# Patient Record
Sex: FEMALE | Race: WHITE | Employment: OTHER | URBAN - METROPOLITAN AREA
[De-identification: names, ages, dates, MRNs, and addresses within clinical notes are randomized per-mention and may not be internally consistent; named-entity substitution may affect disease eponyms.]

---

## 2017-02-07 ENCOUNTER — OFFICE VISIT (OUTPATIENT)
Dept: INTERNAL MEDICINE CLINIC | Age: 82
End: 2017-02-07

## 2017-02-07 VITALS
DIASTOLIC BLOOD PRESSURE: 80 MMHG | HEIGHT: 62 IN | BODY MASS INDEX: 24.29 KG/M2 | HEART RATE: 84 BPM | RESPIRATION RATE: 14 BRPM | WEIGHT: 132 LBS | SYSTOLIC BLOOD PRESSURE: 120 MMHG

## 2017-02-07 DIAGNOSIS — I50.32 HEART FAILURE, DIASTOLIC, CHRONIC (HCC): ICD-10-CM

## 2017-02-07 DIAGNOSIS — Z86.79 OLD CEREBRAL HEMORRHAGE WITHOUT LATE EFFECT: ICD-10-CM

## 2017-02-07 DIAGNOSIS — Z95.2 S/P MVR (MITRAL VALVE REPLACEMENT): ICD-10-CM

## 2017-02-07 DIAGNOSIS — I25.5 CARDIOMYOPATHY, ISCHEMIC: ICD-10-CM

## 2017-02-07 DIAGNOSIS — I10 ESSENTIAL HYPERTENSION: ICD-10-CM

## 2017-02-07 DIAGNOSIS — I50.32 CHRONIC DIASTOLIC CHF (CONGESTIVE HEART FAILURE) (HCC): ICD-10-CM

## 2017-02-07 DIAGNOSIS — I48.0 PAROXYSMAL ATRIAL FIBRILLATION (HCC): ICD-10-CM

## 2017-02-07 DIAGNOSIS — I25.10 CORONARY ARTERY DISEASE INVOLVING NATIVE CORONARY ARTERY OF NATIVE HEART WITHOUT ANGINA PECTORIS: Primary | ICD-10-CM

## 2017-02-07 LAB
A/G RATIO: 1.3 (ref 1.1–2.2)
ALBUMIN SERPL-MCNC: 4.3 G/DL (ref 3.4–5)
ALP BLD-CCNC: 109 U/L (ref 40–129)
ALT SERPL-CCNC: 12 U/L (ref 10–40)
ANION GAP SERPL CALCULATED.3IONS-SCNC: 19 MMOL/L (ref 3–16)
AST SERPL-CCNC: 20 U/L (ref 15–37)
BASOPHILS ABSOLUTE: 0.1 K/UL (ref 0–0.2)
BASOPHILS RELATIVE PERCENT: 1.1 %
BILIRUB SERPL-MCNC: 0.4 MG/DL (ref 0–1)
BUN BLDV-MCNC: 36 MG/DL (ref 7–20)
CALCIUM SERPL-MCNC: 9.5 MG/DL (ref 8.3–10.6)
CHLORIDE BLD-SCNC: 99 MMOL/L (ref 99–110)
CO2: 24 MMOL/L (ref 21–32)
CREAT SERPL-MCNC: 1.1 MG/DL (ref 0.6–1.2)
EOSINOPHILS ABSOLUTE: 0.3 K/UL (ref 0–0.6)
EOSINOPHILS RELATIVE PERCENT: 3.1 %
GFR AFRICAN AMERICAN: 57
GFR NON-AFRICAN AMERICAN: 47
GLOBULIN: 3.2 G/DL
GLUCOSE BLD-MCNC: 83 MG/DL (ref 70–99)
HCT VFR BLD CALC: 36.1 % (ref 36–48)
HEMOGLOBIN: 12 G/DL (ref 12–16)
LYMPHOCYTES ABSOLUTE: 2.2 K/UL (ref 1–5.1)
LYMPHOCYTES RELATIVE PERCENT: 24.5 %
MCH RBC QN AUTO: 29.4 PG (ref 26–34)
MCHC RBC AUTO-ENTMCNC: 33.2 G/DL (ref 31–36)
MCV RBC AUTO: 88.5 FL (ref 80–100)
MONOCYTES ABSOLUTE: 0.8 K/UL (ref 0–1.3)
MONOCYTES RELATIVE PERCENT: 8.6 %
NEUTROPHILS ABSOLUTE: 5.7 K/UL (ref 1.7–7.7)
NEUTROPHILS RELATIVE PERCENT: 62.7 %
PDW BLD-RTO: 15.2 % (ref 12.4–15.4)
PLATELET # BLD: 239 K/UL (ref 135–450)
PMV BLD AUTO: 8.4 FL (ref 5–10.5)
POTASSIUM SERPL-SCNC: 4.4 MMOL/L (ref 3.5–5.1)
RBC # BLD: 4.07 M/UL (ref 4–5.2)
SODIUM BLD-SCNC: 142 MMOL/L (ref 136–145)
TOTAL PROTEIN: 7.5 G/DL (ref 6.4–8.2)
WBC # BLD: 9.1 K/UL (ref 4–11)

## 2017-02-07 PROCEDURE — 99214 OFFICE O/P EST MOD 30 MIN: CPT | Performed by: INTERNAL MEDICINE

## 2017-02-07 ASSESSMENT — ENCOUNTER SYMPTOMS
SHORTNESS OF BREATH: 0
RHINORRHEA: 0
WHEEZING: 0
NAUSEA: 0
VOMITING: 0
COUGH: 0
ABDOMINAL PAIN: 0

## 2017-03-03 RX ORDER — LISINOPRIL 10 MG/1
TABLET ORAL
Qty: 30 TABLET | Refills: 2 | Status: SHIPPED | OUTPATIENT
Start: 2017-03-03 | End: 2017-05-09 | Stop reason: SDUPTHER

## 2017-03-20 ENCOUNTER — TELEPHONE (OUTPATIENT)
Dept: INTERNAL MEDICINE CLINIC | Age: 82
End: 2017-03-20

## 2017-03-21 ENCOUNTER — TELEPHONE (OUTPATIENT)
Dept: INTERNAL MEDICINE CLINIC | Age: 82
End: 2017-03-21

## 2017-03-21 RX ORDER — BENZONATATE 100 MG/1
100 CAPSULE ORAL 3 TIMES DAILY PRN
Qty: 20 CAPSULE | Refills: 0 | Status: SHIPPED | OUTPATIENT
Start: 2017-03-21 | End: 2017-05-09

## 2017-03-21 RX ORDER — AMOXICILLIN 500 MG/1
500 CAPSULE ORAL 3 TIMES DAILY
Qty: 15 CAPSULE | Refills: 0 | Status: SHIPPED | OUTPATIENT
Start: 2017-03-21 | End: 2017-05-09

## 2017-04-17 RX ORDER — FUROSEMIDE 40 MG/1
TABLET ORAL
Qty: 30 TABLET | Refills: 4 | Status: SHIPPED | OUTPATIENT
Start: 2017-04-17 | End: 2017-08-11 | Stop reason: SDUPTHER

## 2017-04-24 RX ORDER — ATORVASTATIN CALCIUM 20 MG/1
TABLET, FILM COATED ORAL
Qty: 30 TABLET | Refills: 2 | Status: SHIPPED | OUTPATIENT
Start: 2017-04-24 | End: 2017-07-26 | Stop reason: SDUPTHER

## 2017-05-09 ENCOUNTER — OFFICE VISIT (OUTPATIENT)
Dept: INTERNAL MEDICINE CLINIC | Age: 82
End: 2017-05-09

## 2017-05-09 VITALS
BODY MASS INDEX: 24.84 KG/M2 | WEIGHT: 135 LBS | SYSTOLIC BLOOD PRESSURE: 130 MMHG | HEART RATE: 84 BPM | RESPIRATION RATE: 14 BRPM | DIASTOLIC BLOOD PRESSURE: 80 MMHG | HEIGHT: 62 IN

## 2017-05-09 DIAGNOSIS — I25.10 CORONARY ARTERY DISEASE INVOLVING NATIVE CORONARY ARTERY OF NATIVE HEART WITHOUT ANGINA PECTORIS: Primary | ICD-10-CM

## 2017-05-09 DIAGNOSIS — I10 ESSENTIAL HYPERTENSION: ICD-10-CM

## 2017-05-09 DIAGNOSIS — I48.0 PAROXYSMAL ATRIAL FIBRILLATION (HCC): ICD-10-CM

## 2017-05-09 DIAGNOSIS — Z86.79 OLD CEREBRAL HEMORRHAGE WITHOUT LATE EFFECT: ICD-10-CM

## 2017-05-09 DIAGNOSIS — I25.5 CARDIOMYOPATHY, ISCHEMIC: ICD-10-CM

## 2017-05-09 DIAGNOSIS — Z95.2 S/P MVR (MITRAL VALVE REPLACEMENT): ICD-10-CM

## 2017-05-09 DIAGNOSIS — I50.32 CHRONIC DIASTOLIC CHF (CONGESTIVE HEART FAILURE) (HCC): ICD-10-CM

## 2017-05-09 PROCEDURE — 99214 OFFICE O/P EST MOD 30 MIN: CPT | Performed by: INTERNAL MEDICINE

## 2017-05-09 RX ORDER — LISINOPRIL 10 MG/1
10 TABLET ORAL DAILY
Qty: 30 TABLET | Refills: 2 | Status: SHIPPED | OUTPATIENT
Start: 2017-05-09 | End: 2017-08-11 | Stop reason: SDUPTHER

## 2017-05-09 ASSESSMENT — ENCOUNTER SYMPTOMS
COUGH: 0
NAUSEA: 0
WHEEZING: 0
RHINORRHEA: 0
SHORTNESS OF BREATH: 0
ABDOMINAL PAIN: 0
VOMITING: 0

## 2017-07-27 RX ORDER — ATORVASTATIN CALCIUM 20 MG/1
TABLET, FILM COATED ORAL
Qty: 30 TABLET | Refills: 2 | Status: SHIPPED | OUTPATIENT
Start: 2017-07-27 | End: 2017-10-19 | Stop reason: SDUPTHER

## 2017-08-11 ENCOUNTER — OFFICE VISIT (OUTPATIENT)
Dept: INTERNAL MEDICINE CLINIC | Age: 82
End: 2017-08-11

## 2017-08-11 VITALS
DIASTOLIC BLOOD PRESSURE: 80 MMHG | HEART RATE: 70 BPM | HEIGHT: 62 IN | RESPIRATION RATE: 14 BRPM | BODY MASS INDEX: 25.21 KG/M2 | SYSTOLIC BLOOD PRESSURE: 110 MMHG | WEIGHT: 137 LBS

## 2017-08-11 DIAGNOSIS — I25.5 CARDIOMYOPATHY, ISCHEMIC: ICD-10-CM

## 2017-08-11 DIAGNOSIS — I48.0 PAROXYSMAL ATRIAL FIBRILLATION (HCC): ICD-10-CM

## 2017-08-11 DIAGNOSIS — I10 ESSENTIAL HYPERTENSION: ICD-10-CM

## 2017-08-11 DIAGNOSIS — I25.10 CORONARY ARTERY DISEASE INVOLVING NATIVE CORONARY ARTERY OF NATIVE HEART WITHOUT ANGINA PECTORIS: Primary | ICD-10-CM

## 2017-08-11 DIAGNOSIS — I50.32 CHRONIC DIASTOLIC CHF (CONGESTIVE HEART FAILURE) (HCC): ICD-10-CM

## 2017-08-11 DIAGNOSIS — Z95.1 S/P CABG (CORONARY ARTERY BYPASS GRAFT): ICD-10-CM

## 2017-08-11 DIAGNOSIS — Z95.2 S/P MVR (MITRAL VALVE REPLACEMENT): ICD-10-CM

## 2017-08-11 LAB
A/G RATIO: 1.2 (ref 1.1–2.2)
ALBUMIN SERPL-MCNC: 4.2 G/DL (ref 3.4–5)
ALP BLD-CCNC: 115 U/L (ref 40–129)
ALT SERPL-CCNC: 32 U/L (ref 10–40)
ANION GAP SERPL CALCULATED.3IONS-SCNC: 13 MMOL/L (ref 3–16)
AST SERPL-CCNC: 34 U/L (ref 15–37)
BASOPHILS ABSOLUTE: 0.1 K/UL (ref 0–0.2)
BASOPHILS RELATIVE PERCENT: 1.6 %
BILIRUB SERPL-MCNC: 0.6 MG/DL (ref 0–1)
BUN BLDV-MCNC: 27 MG/DL (ref 7–20)
CALCIUM SERPL-MCNC: 9.4 MG/DL (ref 8.3–10.6)
CHLORIDE BLD-SCNC: 101 MMOL/L (ref 99–110)
CHOLESTEROL, TOTAL: 175 MG/DL (ref 0–199)
CO2: 28 MMOL/L (ref 21–32)
CREAT SERPL-MCNC: 1 MG/DL (ref 0.6–1.2)
EOSINOPHILS ABSOLUTE: 0.4 K/UL (ref 0–0.6)
EOSINOPHILS RELATIVE PERCENT: 5.6 %
GFR AFRICAN AMERICAN: >60
GFR NON-AFRICAN AMERICAN: 53
GLOBULIN: 3.4 G/DL
GLUCOSE BLD-MCNC: 90 MG/DL (ref 70–99)
HCT VFR BLD CALC: 38.1 % (ref 36–48)
HDLC SERPL-MCNC: 52 MG/DL (ref 40–60)
HEMOGLOBIN: 12.4 G/DL (ref 12–16)
LDL CHOLESTEROL CALCULATED: 95 MG/DL
LYMPHOCYTES ABSOLUTE: 1.5 K/UL (ref 1–5.1)
LYMPHOCYTES RELATIVE PERCENT: 19.7 %
MCH RBC QN AUTO: 29.1 PG (ref 26–34)
MCHC RBC AUTO-ENTMCNC: 32.6 G/DL (ref 31–36)
MCV RBC AUTO: 89.4 FL (ref 80–100)
MONOCYTES ABSOLUTE: 0.7 K/UL (ref 0–1.3)
MONOCYTES RELATIVE PERCENT: 8.5 %
NEUTROPHILS ABSOLUTE: 5 K/UL (ref 1.7–7.7)
NEUTROPHILS RELATIVE PERCENT: 64.6 %
PDW BLD-RTO: 14.6 % (ref 12.4–15.4)
PLATELET # BLD: 240 K/UL (ref 135–450)
PMV BLD AUTO: 8.3 FL (ref 5–10.5)
POTASSIUM SERPL-SCNC: 4.9 MMOL/L (ref 3.5–5.1)
RBC # BLD: 4.26 M/UL (ref 4–5.2)
SODIUM BLD-SCNC: 142 MMOL/L (ref 136–145)
TOTAL PROTEIN: 7.6 G/DL (ref 6.4–8.2)
TRIGL SERPL-MCNC: 141 MG/DL (ref 0–150)
TSH SERPL DL<=0.05 MIU/L-ACNC: 1.83 UIU/ML (ref 0.27–4.2)
URIC ACID, SERUM: 8.3 MG/DL (ref 2.6–6)
VLDLC SERPL CALC-MCNC: 28 MG/DL
WBC # BLD: 7.7 K/UL (ref 4–11)

## 2017-08-11 PROCEDURE — 99214 OFFICE O/P EST MOD 30 MIN: CPT | Performed by: INTERNAL MEDICINE

## 2017-08-11 RX ORDER — FUROSEMIDE 40 MG/1
40 TABLET ORAL DAILY
Qty: 90 TABLET | Refills: 3 | Status: SHIPPED | OUTPATIENT
Start: 2017-08-11 | End: 2018-08-09 | Stop reason: SDUPTHER

## 2017-08-11 RX ORDER — LISINOPRIL 10 MG/1
10 TABLET ORAL DAILY
Qty: 90 TABLET | Refills: 3 | Status: SHIPPED | OUTPATIENT
Start: 2017-08-11 | End: 2018-09-04 | Stop reason: SDUPTHER

## 2017-08-11 RX ORDER — CARVEDILOL 6.25 MG/1
6.25 TABLET ORAL 2 TIMES DAILY WITH MEALS
Qty: 180 TABLET | Refills: 3 | Status: SHIPPED | OUTPATIENT
Start: 2017-08-11 | End: 2018-10-08 | Stop reason: SDUPTHER

## 2017-08-11 ASSESSMENT — PATIENT HEALTH QUESTIONNAIRE - PHQ9
1. LITTLE INTEREST OR PLEASURE IN DOING THINGS: 0
SUM OF ALL RESPONSES TO PHQ9 QUESTIONS 1 & 2: 0
SUM OF ALL RESPONSES TO PHQ QUESTIONS 1-9: 0
2. FEELING DOWN, DEPRESSED OR HOPELESS: 0

## 2017-08-11 ASSESSMENT — ENCOUNTER SYMPTOMS
COUGH: 0
SHORTNESS OF BREATH: 0
VOMITING: 0
WHEEZING: 0
NAUSEA: 0
ABDOMINAL PAIN: 0
RHINORRHEA: 0

## 2017-10-20 RX ORDER — ATORVASTATIN CALCIUM 20 MG/1
TABLET, FILM COATED ORAL
Qty: 30 TABLET | Refills: 2 | Status: SHIPPED | OUTPATIENT
Start: 2017-10-20 | End: 2018-01-16 | Stop reason: SDUPTHER

## 2017-11-13 ENCOUNTER — OFFICE VISIT (OUTPATIENT)
Dept: INTERNAL MEDICINE CLINIC | Age: 82
End: 2017-11-13

## 2017-11-13 VITALS
DIASTOLIC BLOOD PRESSURE: 80 MMHG | BODY MASS INDEX: 25.58 KG/M2 | RESPIRATION RATE: 14 BRPM | WEIGHT: 139 LBS | SYSTOLIC BLOOD PRESSURE: 130 MMHG | HEART RATE: 84 BPM | HEIGHT: 62 IN

## 2017-11-13 DIAGNOSIS — Z00.00 ROUTINE GENERAL MEDICAL EXAMINATION AT A HEALTH CARE FACILITY: ICD-10-CM

## 2017-11-13 DIAGNOSIS — Z23 NEED FOR INFLUENZA VACCINATION: Primary | ICD-10-CM

## 2017-11-13 PROCEDURE — G0438 PPPS, INITIAL VISIT: HCPCS | Performed by: INTERNAL MEDICINE

## 2017-11-13 PROCEDURE — 90662 IIV NO PRSV INCREASED AG IM: CPT | Performed by: INTERNAL MEDICINE

## 2017-11-13 PROCEDURE — G0008 ADMIN INFLUENZA VIRUS VAC: HCPCS | Performed by: INTERNAL MEDICINE

## 2017-11-13 ASSESSMENT — PATIENT HEALTH QUESTIONNAIRE - PHQ9: SUM OF ALL RESPONSES TO PHQ QUESTIONS 1-9: 0

## 2017-11-13 ASSESSMENT — ANXIETY QUESTIONNAIRES: GAD7 TOTAL SCORE: 0

## 2017-11-13 ASSESSMENT — LIFESTYLE VARIABLES: HOW OFTEN DO YOU HAVE A DRINK CONTAINING ALCOHOL: 0

## 2017-11-13 NOTE — PATIENT INSTRUCTIONS
chronic disease, and certain medicines. But, there are steps you can take to sharpen your mind and help preserve your memory. Challenge Your Brain   Regularly challenging your mind may help keeps it in top shape. Good mental exercises include:   Crossword puzzlesUse a dictionary if you need it; you will learn more that way. Brainteasers Try some! Crafts, such as wood working and sewing   Hobbies, such as gardening and building model airplanes   SocializingVisit old friends or join groups to meet new ones. Reading   Learning a new language   Taking a class, whether it be art history or bernadette chi   TravelingExperience the food, history, and culture of your destination   Learning to use a computer   Going to museums, the theater, or thought-provoking movies   Changing things in your daily life, such as reversing your pattern in the grocery store or brushing your teeth using your nondominant hand   Use Memory Aids   There is no need to remember every detail on your own. These memory aids can help:   Calendars and day planners   Electronic organizers to store all sorts of helpful informationThese devices can \"beep\" to remind you of appointments. A book of days to record birthdays, anniversaries, and other occasions that occur on the same date every year   Detailed \"to-do\" lists and strategically placed sticky notes   Quick \"study\" sessionsBefore a gathering, review who will be there so their names will be fresh in your mind. Establish routinesFor example, keep your keys, wallet, and umbrella in the same place all the time or take medicine with your 8:00 AM glass of juice   Live a Healthy Life   Many actions that will keep your body strong will do the same for your mind. For example:   Talk to Your Doctor About Herbs and Supplements    Malnutrition and vitamin deficiencies can impair your mental function. For example, vitamin B12 deficiency can cause a range of symptoms, including confusion.  But, what if your nutritional needs are being met? Can herbs and supplements still offer a benefit? Researchers have investigated a range of natural remedies, such as ginkgo , ginseng , and the supplement phosphatidylserine (PS). So far, though, the evidence is inconsistent as to whether these products can improve memory or thinking. If you are interested in taking herbs and supplements, talk to your doctor first because they may interact with other medicines that you are taking. Exercise Regularly    Among the many benefits of regular exercise are increased blood flow to the brain and decreased risk of certain diseases that can interfere with memory function. One study found that even moderate exercise has a beneficial effect. Examples of \"moderate\" exercise include:   Playing 18 holes of golf once a week, without a cart   Playing tennis twice a week   Walking one mile per day   Manage Stress    It can be tough to remember what is important when your mind is cluttered. Make time for relaxation. Choose activities that calm you down, and make it routine. Manage Chronic Conditions    Side effects of high blood pressure , diabetes, and heart disease can interfere with mental function. Many of the lifestyle steps discussed here can help manage these conditions. Strive to eat a healthy diet, exercise regularly, get stress under control, and follow your doctor's advice for your condition. Minimize Medications    Talk to your doctor about the medicines that you take. Some may be unnecessary. Also, healthy lifestyle habits may lower the need for certain drugs. Last Reviewed: April 2010 Blanca Bal MD   Updated: 4/13/2010   ·     [address]       As we get older, changes in balance, gait, strength, vision, hearing, and cognition make even the most youthful senior more prone to accidents. Falls are one of the leading health risks for older people.  This increased risk of falling is related to:   Aging process (eg, cannot see clearly, you are more likely to fall. Important questions to ask yourself include:   Are lamp, electric, extension, and telephone cords placed out of the flow of traffic and maintained in good condition? Have frayed cords been replaced? Are all small rugs and runners slip resistant? If not, you can secure them to the floor with a special double-sided carpet tape. Are smoke detectors properly locatedone on every floor of your home and one outside of every sleeping area? Are they in good working order? Are batteries replaced at least once a year? Do you have a well-maintained carbon monoxide detector outside every sleeping are in your home? Does your furniture layout leave plenty of space to maneuver between and around chairs, tables, beds, and sofas? Are hallways, stairs and passages between rooms well lit? Can you reach a lamp without getting out of bed? Are floor surfaces well maintained? Shag rugs, high-pile carpeting, tile floors, and polished wood floors can be particularly slippery. Stairs should always have handrails and be carpeted or fitted with a non-skid tread. Is your telephone easily reachable. Is the cord safely tucked away? Room by Room   According to the Association of Aging, bathrooms and archana are the two most potentially hazardous rooms in your home. In the Kitchen    Be sure your stove is in proper working order and always make sure burners and the oven are off before you go out or go to sleep. Keep pots on the back burners, turn handles away from the front of the stove, and keep stove clean and free of grease build-up. Kitchen ventilation systems and range exhausts should be working properly. Keep flammable objects such as towels and pot holders away from the cooking area except when in use. Make sure kitchen curtains are tied back. Move cords and appliances away from the sink and hot surfaces.  If extension cords are needed, install wiring guides so

## 2017-11-13 NOTE — PROGRESS NOTES
Medicare Annual Wellness Visit  Name: Jovany Maradiaga Date: 2017   MRN: [id number] Sex: Female   Age: 80 y.o. Ethnicity: Non-/Non    : 1931 Race: Priyank Angel is here for Medicare AWV    Screenings for behavioral, psychosocial and functional/safety risks, and cognitive dysfunction are all negative except as indicated below. These results, as well as other patient data from the [address] form, are documented in Flowsheets linked to this Encounter. Allergies   Allergen Reactions    Morphine Shortness Of Breath    Warfarin Other (See Comments)    Cefuroxime     Levofloxacin [Levofloxacin]      rash    Azithromycin Nausea And Vomiting     States had N/V one hour past oral doses of this med. Prior to Visit Medications    Medication Sig Taking? Authorizing Provider   atorvastatin (LIPITOR) 20 MG tablet TAKE ONE TABLET BY MOUTH DAILY  Balwinder Verma MD   lisinopril (PRINIVIL;ZESTRIL) 10 MG tablet Take 1 tablet by mouth daily  Balwinder Verma MD   furosemide (LASIX) 40 MG tablet Take 1 tablet by mouth daily  Balwinder Verma MD   carvedilol (COREG) 6.25 MG tablet Take 1 tablet by mouth 2 times daily (with meals)  Balwinder Verma MD   aspirin 81 MG tablet Take 81 mg by mouth daily. Historical Provider, MD   fexofenadine (ALLEGRA) 180 MG tablet Take 180 mg by mouth daily as needed. Historical Provider, MD     Past Medical History:   Diagnosis Date    Acute blood loss anemia 2012    Atrial fib/flutter, transient     CAD (coronary artery disease)     Cardiogenic shock ([facility] 2012    [facility]     Chronic diastolic CHF (congestive heart failure) ([facility] 2014    Encephalopathy, metabolic     WNL EEG and CT HEAD.      History of blood transfusion     Hypertension     MI, old     Mitral regurgitation 2012    No history of procedure 10/03/2016    no history No  Do you eat fewer than 2 meals per day?: No  Have you seen a dentist within the past year?:  (Dentures)  Body mass index is 25.42 kg/m². Health Habits/Nutrition Interventions:  · None indicated    Hearing/Vision  Do you or your family notice any trouble with your hearing?: (!) Yes  Do you have difficulty driving, watching TV, or doing any of your daily activities because of your eyesight?: (!) Yes  Have you had an eye exam within the past year?: Yes  Hearing/Vision Interventions:  · Hearing concerns:  patient declines any further evaluation/treatment for hearing issues    Safety  Do you have working smoke detectors?: Yes  Have all throw rugs been removed or fastened?: (!) No  Do you have non-slip mats in all bathtubs?: Yes  Do all of your stairways have a railing or banister?: Yes  Are your doorways, halls and stairs free of clutter?: Yes  Do you always fasten your seatbelt when you are in a car?: Yes  Safety Interventions:  · None indicated    ADLs  In the past 7 days, did you need help from others to perform any of the following everyday activities?: (!) Grooming, Bathing  In the past 7 days, did you need help from others to take care of any of the following?: [employer], Housekeeping, [employer], Shopping, Food Preparation, Transportation, Taking Medications  ADL Interventions:  · Lives with daughter who helps.     Personalized Preventive Plan   Current Health Maintenance Status  Immunization History   Administered Date(s) Administered    Influenza Virus Vaccine 10/01/2012, 09/11/2013, 10/07/2015    Influenza, High Dose 09/27/2016, 11/13/2017    Pneumococcal 13-valent Conjugate ([id number]) 04/11/2016    Pneumococcal Polysaccharide ([id number]) 04/07/2012    Zoster 08/13/2014        Health Maintenance   Topic Date Due    DTaP/Tdap/Td vaccine (1 - Tdap) 11/14/1950    DEXA (modify frequency per FRAX score)  11/14/1996    Zostavax vaccine  Completed    Flu vaccine  Completed    Pneumococcal low/med risk  Completed     Recommendations for Preventive Services Due: see orders.   Recommended screening schedule for the next 5-10 years is provided to the patient in written form: see Patient Instructions/AVS.

## 2017-11-28 ENCOUNTER — OFFICE VISIT (OUTPATIENT)
Dept: INTERNAL MEDICINE CLINIC | Age: 82
End: 2017-11-28

## 2017-11-28 VITALS
SYSTOLIC BLOOD PRESSURE: 118 MMHG | BODY MASS INDEX: 25.4 KG/M2 | RESPIRATION RATE: 16 BRPM | HEART RATE: 90 BPM | DIASTOLIC BLOOD PRESSURE: 64 MMHG | WEIGHT: 138 LBS | HEIGHT: 62 IN

## 2017-11-28 DIAGNOSIS — J06.9 UPPER RESPIRATORY TRACT INFECTION, UNSPECIFIED TYPE: Primary | ICD-10-CM

## 2017-11-28 PROCEDURE — 99212 OFFICE O/P EST SF 10 MIN: CPT | Performed by: NURSE PRACTITIONER

## 2017-11-28 RX ORDER — DOXYCYCLINE HYCLATE 100 MG
100 TABLET ORAL 2 TIMES DAILY
Qty: 14 TABLET | Refills: 0 | Status: SHIPPED | OUTPATIENT
Start: 2017-11-28 | End: 2017-12-05

## 2017-11-28 ASSESSMENT — ENCOUNTER SYMPTOMS
RHINORRHEA: 1
VOMITING: 1
COUGH: 1
SORE THROAT: 1
NAUSEA: 1
DIARRHEA: 0
ABDOMINAL PAIN: 0
WHEEZING: 1

## 2017-11-28 NOTE — PROGRESS NOTES
Chief Complaint:   Latisha Dodson is a 80 y.o. female who presents for   Chief Complaint   Patient presents with   Claudia Madrigal is a 80 y.o. who presents for initial evaluation of    URI    This is a new problem. Episode onset: 3 days ago. The problem has been unchanged. The maximum temperature recorded prior to her arrival was 100.4 - 100.9 F. Associated symptoms include congestion, coughing (worse when she lays down), headaches, nausea, rhinorrhea, a sore throat, vomiting and wheezing. Pertinent negatives include no abdominal pain, chest pain, diarrhea, dysuria or ear pain. Treatments tried: Coricidin. The treatment provided mild relief. Review of Systems  Review of Systems   Constitutional: Positive for fatigue and fever (low grade temp). HENT: Positive for congestion, rhinorrhea and sore throat. Negative for ear pain. Respiratory: Positive for cough (worse when she lays down) and wheezing. Cardiovascular: Negative for chest pain. Gastrointestinal: Positive for nausea and vomiting. Negative for abdominal pain and diarrhea. Genitourinary: Negative for dysuria. Neurological: Positive for headaches. Allergies  Morphine; Warfarin; Cefuroxime; Levofloxacin [levofloxacin]; and Azithromycin      Vitals  /64 (Site: Left Arm, Position: Sitting)   Pulse 90   Resp 16   Ht 5' 2\" (1.575 m)   Wt 138 lb (62.6 kg)   BMI 25.24 kg/m²     Current Medications  Current Outpatient Prescriptions   Medication Sig Dispense Refill    atorvastatin (LIPITOR) 20 MG tablet TAKE ONE TABLET BY MOUTH DAILY 30 tablet 2    lisinopril (PRINIVIL;ZESTRIL) 10 MG tablet Take 1 tablet by mouth daily 90 tablet 3    furosemide (LASIX) 40 MG tablet Take 1 tablet by mouth daily 90 tablet 03    carvedilol (COREG) 6.25 MG tablet Take 1 tablet by mouth 2 times daily (with meals) 180 tablet 3    aspirin 81 MG tablet Take 81 mg by mouth daily.       fexofenadine (ALLEGRA) 180 MG tablet Take 180 mg by clear and moist. No oropharyngeal exudate. Eyes: Conjunctivae are normal. Pupils are equal, round, and reactive to light. Neck: No tracheal deviation present. No thyromegaly present. Cardiovascular: Normal rate, regular rhythm and normal heart sounds. Exam reveals no gallop and no friction rub. No murmur heard. Pulmonary/Chest: Effort normal and breath sounds normal. No respiratory distress. She has no wheezes. She has no rales. Abdominal: Soft. Bowel sounds are normal. She exhibits no distension. There is no tenderness. Musculoskeletal: She exhibits no edema. Lymphadenopathy:     She has no cervical adenopathy. Neurological: She is alert and oriented to person, place, and time. Skin: Skin is warm and dry. No rash noted. Psychiatric: She has a normal mood and affect. Her behavior is normal. Judgment and thought content normal.         Assessment and Plan    URI  - Given Rx. For Doxycycline 100 mg 2 times daily for 7 days. May use Tylenol and throat lozenges as needed. May also use Coricidin to help with the symptoms. Notify provider if symptoms worsen or do not improve.        Lloyd VAUGHAN-C  11/28/2017

## 2018-01-17 RX ORDER — ATORVASTATIN CALCIUM 20 MG/1
TABLET, FILM COATED ORAL
Qty: 30 TABLET | Refills: 2 | Status: SHIPPED | OUTPATIENT
Start: 2018-01-17 | End: 2018-04-23 | Stop reason: SDUPTHER

## 2018-02-16 ENCOUNTER — OFFICE VISIT (OUTPATIENT)
Dept: INTERNAL MEDICINE CLINIC | Age: 83
End: 2018-02-16

## 2018-02-16 VITALS
SYSTOLIC BLOOD PRESSURE: 130 MMHG | WEIGHT: 138 LBS | DIASTOLIC BLOOD PRESSURE: 80 MMHG | BODY MASS INDEX: 25.4 KG/M2 | RESPIRATION RATE: 14 BRPM | HEART RATE: 84 BPM | HEIGHT: 62 IN

## 2018-02-16 DIAGNOSIS — I25.10 CORONARY ARTERY DISEASE INVOLVING NATIVE CORONARY ARTERY OF NATIVE HEART WITHOUT ANGINA PECTORIS: Primary | ICD-10-CM

## 2018-02-16 DIAGNOSIS — I50.32 HEART FAILURE, DIASTOLIC, CHRONIC (HCC): ICD-10-CM

## 2018-02-16 DIAGNOSIS — I50.32 CHRONIC DIASTOLIC CHF (CONGESTIVE HEART FAILURE) (HCC): ICD-10-CM

## 2018-02-16 DIAGNOSIS — Z95.2 S/P MVR (MITRAL VALVE REPLACEMENT): ICD-10-CM

## 2018-02-16 DIAGNOSIS — I25.10 CORONARY ARTERY DISEASE INVOLVING NATIVE CORONARY ARTERY OF NATIVE HEART WITHOUT ANGINA PECTORIS: ICD-10-CM

## 2018-02-16 DIAGNOSIS — I48.0 PAROXYSMAL ATRIAL FIBRILLATION (HCC): ICD-10-CM

## 2018-02-16 DIAGNOSIS — I10 ESSENTIAL HYPERTENSION: ICD-10-CM

## 2018-02-16 DIAGNOSIS — I25.5 CARDIOMYOPATHY, ISCHEMIC: ICD-10-CM

## 2018-02-16 DIAGNOSIS — Z95.1 S/P CABG (CORONARY ARTERY BYPASS GRAFT): ICD-10-CM

## 2018-02-16 LAB
A/G RATIO: 1.2 (ref 1.1–2.2)
ALBUMIN SERPL-MCNC: 4.1 G/DL (ref 3.4–5)
ALP BLD-CCNC: 117 U/L (ref 40–129)
ALT SERPL-CCNC: 36 U/L (ref 10–40)
ANION GAP SERPL CALCULATED.3IONS-SCNC: 14 MMOL/L (ref 3–16)
AST SERPL-CCNC: 40 U/L (ref 15–37)
BASOPHILS ABSOLUTE: 0.1 K/UL (ref 0–0.2)
BASOPHILS RELATIVE PERCENT: 1.3 %
BILIRUB SERPL-MCNC: 0.3 MG/DL (ref 0–1)
BUN BLDV-MCNC: 28 MG/DL (ref 7–20)
CALCIUM SERPL-MCNC: 9.3 MG/DL (ref 8.3–10.6)
CHLORIDE BLD-SCNC: 103 MMOL/L (ref 99–110)
CO2: 27 MMOL/L (ref 21–32)
CREAT SERPL-MCNC: 1 MG/DL (ref 0.6–1.2)
EOSINOPHILS ABSOLUTE: 0.3 K/UL (ref 0–0.6)
EOSINOPHILS RELATIVE PERCENT: 3.3 %
GFR AFRICAN AMERICAN: >60
GFR NON-AFRICAN AMERICAN: 53
GLOBULIN: 3.4 G/DL
GLUCOSE BLD-MCNC: 96 MG/DL (ref 70–99)
HCT VFR BLD CALC: 37.6 % (ref 36–48)
HEMOGLOBIN: 12.2 G/DL (ref 12–16)
LYMPHOCYTES ABSOLUTE: 2 K/UL (ref 1–5.1)
LYMPHOCYTES RELATIVE PERCENT: 22.4 %
MCH RBC QN AUTO: 29.1 PG (ref 26–34)
MCHC RBC AUTO-ENTMCNC: 32.5 G/DL (ref 31–36)
MCV RBC AUTO: 89.7 FL (ref 80–100)
MONOCYTES ABSOLUTE: 0.9 K/UL (ref 0–1.3)
MONOCYTES RELATIVE PERCENT: 10.2 %
NEUTROPHILS ABSOLUTE: 5.7 K/UL (ref 1.7–7.7)
NEUTROPHILS RELATIVE PERCENT: 62.8 %
PDW BLD-RTO: 15.2 % (ref 12.4–15.4)
PLATELET # BLD: 266 K/UL (ref 135–450)
PMV BLD AUTO: 8.1 FL (ref 5–10.5)
POTASSIUM SERPL-SCNC: 4.4 MMOL/L (ref 3.5–5.1)
RBC # BLD: 4.19 M/UL (ref 4–5.2)
SODIUM BLD-SCNC: 144 MMOL/L (ref 136–145)
TOTAL PROTEIN: 7.5 G/DL (ref 6.4–8.2)
WBC # BLD: 9 K/UL (ref 4–11)

## 2018-02-16 PROCEDURE — 99214 OFFICE O/P EST MOD 30 MIN: CPT | Performed by: INTERNAL MEDICINE

## 2018-02-16 ASSESSMENT — ENCOUNTER SYMPTOMS
COUGH: 0
ABDOMINAL PAIN: 0
SHORTNESS OF BREATH: 0
VOMITING: 0
RHINORRHEA: 0
WHEEZING: 0
NAUSEA: 0

## 2018-02-16 NOTE — PATIENT INSTRUCTIONS
Choose vegetables more often than vegetable juice. · Get 2 to 3 servings of low-fat and fat-free dairy each day. A serving is 8 ounces of milk, 1 cup of yogurt, or 1 ½ ounces of cheese. · Eat 6 to 8 servings of grains each day. A serving is 1 slice of bread, 1 ounce of dry cereal, or ½ cup of cooked rice, pasta, or cooked cereal. Try to choose whole-grain products as much as possible. · Limit lean meat, poultry, and fish to 2 servings each day. A serving is 3 ounces, about the size of a deck of cards. · Eat 4 to 5 servings of nuts, seeds, and legumes (cooked dried beans, lentils, and split peas) each week. A serving is 1/3 cup of nuts, 2 tablespoons of seeds, or ½ cup of cooked beans or peas. · Limit fats and oils to 2 to 3 servings each day. A serving is 1 teaspoon of vegetable oil or 2 tablespoons of salad dressing. · Limit sweets and added sugars to 5 servings or less a week. A serving is 1 tablespoon jelly or jam, ½ cup sorbet, or 1 cup of lemonade. · Eat less than 2,300 milligrams (mg) of sodium a day. If you limit your sodium to 1,500 mg a day, you can lower your blood pressure even more. Tips for success  · Start small. Do not try to make dramatic changes to your diet all at once. You might feel that you are missing out on your favorite foods and then be more likely to not follow the plan. Make small changes, and stick with them. Once those changes become habit, add a few more changes. · Try some of the following:  ¨ Make it a goal to eat a fruit or vegetable at every meal and at snacks. This will make it easy to get the recommended amount of fruits and vegetables each day. ¨ Try yogurt topped with fruit and nuts for a snack or healthy dessert. ¨ Add lettuce, tomato, cucumber, and onion to sandwiches. ¨ Combine a ready-made pizza crust with low-fat mozzarella cheese and lots of vegetable toppings. Try using tomatoes, squash, spinach, broccoli, carrots, cauliflower, and onions.   ¨ Have a variety of cut-up vegetables with a low-fat dip as an appetizer instead of chips and dip. ¨ Sprinkle sunflower seeds or chopped almonds over salads. Or try adding chopped walnuts or almonds to cooked vegetables. ¨ Try some vegetarian meals using beans and peas. Add garbanzo or kidney beans to salads. Make burritos and tacos with mashed hastings beans or black beans. Where can you learn more? Go to https://SomeecardspeOpen-Plug.[facility]. org and sign in to your [employer] account. Enter B515 in the [facility] box to learn more about \"DASH Diet: Care Instructions. \"     If you do not have an account, please click on the \"Sign Up Now\" link. Current as of: September 21, 2016  Content Version: 11.5  © 2889-7982 Healthwise, Inc[employer]. Care instructions adapted under license by [facility] [address] If you have questions about a medical condition or this instruction, always ask your healthcare professional. [address] any warranty or liability for your use of this information.

## 2018-02-16 NOTE — PROGRESS NOTES
[facility])            Plan:      A fib:  She is in NSR. She is not on warfarin. She had a bleed in the past.  She is doing well. She has had her left atrial appendage ligated. CAD:  Stable. No chest pain. On ASA. Sees cardiology. CHF: diastolic. On Lasix. No recent issues. No flare ups. Mitral regurgitation:  S/p valve replacement. No issues. HTN is controlled. Jennifer received counseling on the following healthy behaviors: nutrition and medication adherence  Reviewed prior labs and health maintenance  Continue current medications, diet and exercise. Discussed use, benefit, and side effects of prescribed medications. Barriers to medication compliance addressed. Patient given educational materials - see patient instructions  Was a self-tracking handout given in paper form or via [employer]? Yes    Requested Prescriptions      No prescriptions requested or ordered in this encounter       All patient questions answered. Patient voiced understanding. Quality Measures    Body mass index is 25.24 kg/m². Elevated. Weight control planned discussed Healthy diet and regular exercise. BP: 130/80. Blood pressure is normal. Treatment plan consists of No treatment change needed. Fall Risk 11/13/2017 8/11/2017 7/19/2016 6/30/2015 6/10/2014 4/10/2014   2 or more falls in past year? no no no no no no   Fall with injury in past year? no no no no yes no     The patient does not have a history of falls. I did not - not indicated , complete a risk assessment for falls.  A plan of care for falls No Treatment plan indicated    Lab Results   Component Value Date    LDLCALC 95 08/11/2017    (goal LDL reduction with dx if diabetes is 50% LDL reduction)    PHQ Scores 11/13/2017 8/11/2017 7/19/2016 6/30/2015 4/10/2014   PHQ2 Score 0 0 0 0 0   PHQ9 Score 0 0 0 0 0     Interpretation of Total Score Depression Severity: 1-4 = Minimal depression, 5-9 = Mild depression, 10-14 = Moderate depression, 15-19 = Moderately severe depression, 20-27 = Severe depression             Discussed use, benefit, and side effects of prescribed medications. Barriers to medication compliance addressed. All patient questions answered. Pt voiced understanding.

## 2018-04-23 RX ORDER — ATORVASTATIN CALCIUM 20 MG/1
TABLET, FILM COATED ORAL
Qty: 30 TABLET | Refills: 2 | Status: SHIPPED | OUTPATIENT
Start: 2018-04-23 | End: 2018-07-18 | Stop reason: SDUPTHER

## 2018-05-16 ENCOUNTER — OFFICE VISIT (OUTPATIENT)
Dept: INTERNAL MEDICINE CLINIC | Age: 83
End: 2018-05-16

## 2018-05-16 VITALS
RESPIRATION RATE: 14 BRPM | HEART RATE: 84 BPM | WEIGHT: 138 LBS | HEIGHT: 62 IN | BODY MASS INDEX: 25.4 KG/M2 | SYSTOLIC BLOOD PRESSURE: 130 MMHG | DIASTOLIC BLOOD PRESSURE: 80 MMHG

## 2018-05-16 DIAGNOSIS — I10 ESSENTIAL HYPERTENSION: ICD-10-CM

## 2018-05-16 DIAGNOSIS — I25.5 CARDIOMYOPATHY, ISCHEMIC: ICD-10-CM

## 2018-05-16 DIAGNOSIS — I48.0 PAROXYSMAL ATRIAL FIBRILLATION (HCC): ICD-10-CM

## 2018-05-16 DIAGNOSIS — Z86.79 OLD CEREBRAL HEMORRHAGE WITHOUT LATE EFFECT: ICD-10-CM

## 2018-05-16 DIAGNOSIS — I50.32 CHRONIC DIASTOLIC CHF (CONGESTIVE HEART FAILURE) (HCC): ICD-10-CM

## 2018-05-16 DIAGNOSIS — Z95.2 S/P MVR (MITRAL VALVE REPLACEMENT): ICD-10-CM

## 2018-05-16 DIAGNOSIS — Z95.1 S/P CABG (CORONARY ARTERY BYPASS GRAFT): ICD-10-CM

## 2018-05-16 DIAGNOSIS — I25.10 CORONARY ARTERY DISEASE INVOLVING NATIVE CORONARY ARTERY OF NATIVE HEART WITHOUT ANGINA PECTORIS: Primary | ICD-10-CM

## 2018-05-16 PROCEDURE — 99214 OFFICE O/P EST MOD 30 MIN: CPT | Performed by: INTERNAL MEDICINE

## 2018-05-16 ASSESSMENT — ENCOUNTER SYMPTOMS
WHEEZING: 0
COUGH: 0
RHINORRHEA: 0
VOMITING: 0
ABDOMINAL PAIN: 0
NAUSEA: 0
SHORTNESS OF BREATH: 0

## 2018-07-19 RX ORDER — ATORVASTATIN CALCIUM 20 MG/1
TABLET, FILM COATED ORAL
Qty: 30 TABLET | Refills: 2 | Status: SHIPPED | OUTPATIENT
Start: 2018-07-19 | End: 2018-09-29 | Stop reason: SDUPTHER

## 2018-07-24 ENCOUNTER — OFFICE VISIT (OUTPATIENT)
Dept: INTERNAL MEDICINE CLINIC | Age: 83
End: 2018-07-24

## 2018-07-24 VITALS
SYSTOLIC BLOOD PRESSURE: 130 MMHG | BODY MASS INDEX: 24.66 KG/M2 | HEIGHT: 62 IN | HEART RATE: 80 BPM | DIASTOLIC BLOOD PRESSURE: 80 MMHG | RESPIRATION RATE: 14 BRPM | WEIGHT: 134 LBS

## 2018-07-24 DIAGNOSIS — I10 ESSENTIAL HYPERTENSION: ICD-10-CM

## 2018-07-24 DIAGNOSIS — Z01.818 PREOP EXAM FOR INTERNAL MEDICINE: Primary | ICD-10-CM

## 2018-07-24 DIAGNOSIS — I48.0 PAROXYSMAL ATRIAL FIBRILLATION (HCC): ICD-10-CM

## 2018-07-24 DIAGNOSIS — H26.9 CATARACT OF LEFT EYE, UNSPECIFIED CATARACT TYPE: ICD-10-CM

## 2018-07-24 DIAGNOSIS — I25.10 CORONARY ARTERY DISEASE INVOLVING NATIVE CORONARY ARTERY OF NATIVE HEART WITHOUT ANGINA PECTORIS: ICD-10-CM

## 2018-07-24 DIAGNOSIS — I50.32 CHRONIC DIASTOLIC CHF (CONGESTIVE HEART FAILURE) (HCC): ICD-10-CM

## 2018-07-24 DIAGNOSIS — I25.5 CARDIOMYOPATHY, ISCHEMIC: ICD-10-CM

## 2018-07-24 PROCEDURE — 99214 OFFICE O/P EST MOD 30 MIN: CPT | Performed by: INTERNAL MEDICINE

## 2018-07-24 NOTE — PROGRESS NOTES
atorvastatin (LIPITOR) 20 MG tablet TAKE ONE TABLET BY MOUTH DAILY 30 tablet 2    lisinopril (PRINIVIL;ZESTRIL) 10 MG tablet Take 1 tablet by mouth daily 90 tablet 3    furosemide (LASIX) 40 MG tablet Take 1 tablet by mouth daily 90 tablet 03    carvedilol (COREG) 6.25 MG tablet Take 1 tablet by mouth 2 times daily (with meals) 180 tablet 3    aspirin 81 MG tablet Take 81 mg by mouth daily.  fexofenadine (ALLEGRA) 180 MG tablet Take 180 mg by mouth daily as needed. No current facility-administered medications for this visit. Allergies   Allergen Reactions    Morphine Shortness Of Breath    Warfarin Other (See Comments)    Cefuroxime     Levofloxacin [Levofloxacin]      rash    Azithromycin Nausea And Vomiting     States had N/V one hour past oral doses of this med. Review of Systems  A comprehensive review of systems was negative.      Planned anesthesia: Local  Known anesthesia problems: no  Bleeding risk: no  Personal or FH of DVT/PE:  no  Patient objection to receiving blood products: no     Objective:      /80 (Site: Right Arm, Position: Sitting, Cuff Size: Medium Adult)   Pulse 80   Resp 14   Ht 5' 2\" (1.575 m)   Wt 134 lb (60.8 kg)   BMI 24.51 kg/m²     General Appearance:  Alert, cooperative, no distress, appears stated age   Head:  Normocephalic, without obvious abnormality, atraumatic   Eyes:  PERRL, conjunctiva/corneas clear, EOM's intact, cataract left eye   Ears:  deferred   Nose: Nares normal, septum midline,mucosa normal, no drainage or sinus tenderness   Throat: Lips, mucosa, and tongue normal; teeth and gums normal   Neck: Supple, symmetrical, trachea midline, no adenopathy;  thyroid: not enlarged, symmetric, no tenderness/mass/nodules; no carotid bruit or JVD   Back:   deferred   Lungs:   Clear to auscultation bilaterally, respirations unlabored   Breasts:  deferred   Heart:  Regular rate and rhythm, S1 and S2 normal, + murmur, rub, or gallop   Abdomen:   Soft,

## 2018-08-10 RX ORDER — FUROSEMIDE 40 MG/1
TABLET ORAL
Qty: 90 TABLET | Refills: 0 | Status: SHIPPED | OUTPATIENT
Start: 2018-08-10 | End: 2018-11-12 | Stop reason: SDUPTHER

## 2018-09-04 ENCOUNTER — OFFICE VISIT (OUTPATIENT)
Dept: INTERNAL MEDICINE CLINIC | Age: 83
End: 2018-09-04

## 2018-09-04 VITALS
BODY MASS INDEX: 25.03 KG/M2 | WEIGHT: 136 LBS | RESPIRATION RATE: 14 BRPM | HEIGHT: 62 IN | SYSTOLIC BLOOD PRESSURE: 110 MMHG | HEART RATE: 80 BPM | DIASTOLIC BLOOD PRESSURE: 70 MMHG

## 2018-09-04 DIAGNOSIS — Z95.2 S/P MVR (MITRAL VALVE REPLACEMENT): ICD-10-CM

## 2018-09-04 DIAGNOSIS — I25.10 CORONARY ARTERY DISEASE INVOLVING NATIVE CORONARY ARTERY OF NATIVE HEART WITHOUT ANGINA PECTORIS: Primary | ICD-10-CM

## 2018-09-04 DIAGNOSIS — I25.5 CARDIOMYOPATHY, ISCHEMIC: ICD-10-CM

## 2018-09-04 DIAGNOSIS — Z23 NEED FOR INFLUENZA VACCINATION: ICD-10-CM

## 2018-09-04 DIAGNOSIS — I10 ESSENTIAL HYPERTENSION: ICD-10-CM

## 2018-09-04 DIAGNOSIS — Z95.1 S/P CABG (CORONARY ARTERY BYPASS GRAFT): ICD-10-CM

## 2018-09-04 DIAGNOSIS — I48.0 PAROXYSMAL ATRIAL FIBRILLATION (HCC): ICD-10-CM

## 2018-09-04 DIAGNOSIS — I50.32 CHRONIC DIASTOLIC CHF (CONGESTIVE HEART FAILURE) (HCC): ICD-10-CM

## 2018-09-04 PROCEDURE — 99214 OFFICE O/P EST MOD 30 MIN: CPT | Performed by: INTERNAL MEDICINE

## 2018-09-04 PROCEDURE — 90662 IIV NO PRSV INCREASED AG IM: CPT | Performed by: INTERNAL MEDICINE

## 2018-09-04 PROCEDURE — G0008 ADMIN INFLUENZA VIRUS VAC: HCPCS | Performed by: INTERNAL MEDICINE

## 2018-09-04 RX ORDER — LISINOPRIL 10 MG/1
10 TABLET ORAL DAILY
Qty: 90 TABLET | Refills: 3 | Status: ON HOLD | OUTPATIENT
Start: 2018-09-04 | End: 2019-01-19 | Stop reason: HOSPADM

## 2018-09-04 RX ORDER — PANTOPRAZOLE SODIUM 40 MG/1
40 TABLET, DELAYED RELEASE ORAL DAILY
Qty: 90 TABLET | Refills: 1 | Status: SHIPPED | OUTPATIENT
Start: 2018-09-04 | End: 2019-03-09 | Stop reason: SDUPTHER

## 2018-09-04 ASSESSMENT — ENCOUNTER SYMPTOMS
SHORTNESS OF BREATH: 0
VOMITING: 0
WHEEZING: 0
NAUSEA: 0
ABDOMINAL PAIN: 0
COUGH: 0
RHINORRHEA: 0

## 2018-09-04 NOTE — PROGRESS NOTES
81 mg by mouth daily. , Disp: , Rfl:     fexofenadine (ALLEGRA) 180 MG tablet, Take 180 mg by mouth daily as needed. , Disp: , Rfl:        There are no changes to past medical history, family history, social history or review of systems(except as noted in the history section) since prior note (all reviewed with patient). /70 (Site: Right Arm, Position: Sitting, Cuff Size: Medium Adult)   Pulse 80   Resp 14   Ht 5' 2\" (1.575 m)   Wt 136 lb (61.7 kg)   BMI 24.87 kg/m²      Objective:   Physical Exam   Constitutional: She is oriented to person, place, and time. She appears well-developed and well-nourished. HENT:   Head: Normocephalic. Eyes: Pupils are equal, round, and reactive to light. Conjunctivae and EOM are normal.   Neck: Trachea normal and normal range of motion. Neck supple. No JVD present. Carotid bruit is present. No thyroid mass and no thyromegaly present. Cardiovascular: Normal rate, regular rhythm and normal heart sounds. Exam reveals no gallop. No murmur heard. Pulses:       Carotid pulses are on the right side with bruit, and on the left side with bruit. She has prosthetic valve sound     Pulmonary/Chest: Effort normal and breath sounds normal. No respiratory distress. She has no wheezes. She has no rales. Abdominal: Soft. Bowel sounds are normal. She exhibits no distension and no mass. There is no splenomegaly or hepatomegaly. There is no tenderness. Musculoskeletal: She exhibits edema (trace). Lymphadenopathy:     She has no cervical adenopathy. Neurological: She is alert and oriented to person, place, and time. She has normal strength. Skin: Skin is warm and dry. No rash noted. Psychiatric: She has a normal mood and affect. Her behavior is normal. Judgment and thought content normal.       Assessment:       Diagnosis Orders   1.  Coronary artery disease involving native coronary artery of native heart without angina pectoris  Comprehensive Metabolic Panel    CBC

## 2018-09-08 ENCOUNTER — APPOINTMENT (OUTPATIENT)
Dept: GENERAL RADIOLOGY | Age: 83
DRG: 871 | End: 2018-09-08
Payer: MEDICARE

## 2018-09-08 ENCOUNTER — HOSPITAL ENCOUNTER (INPATIENT)
Age: 83
LOS: 2 days | Discharge: HOME | DRG: 871 | End: 2018-09-10
Attending: EMERGENCY MEDICINE | Admitting: INTERNAL MEDICINE
Payer: MEDICARE

## 2018-09-08 DIAGNOSIS — J18.9 PNEUMONIA OF LEFT LOWER LOBE DUE TO INFECTIOUS ORGANISM: Primary | ICD-10-CM

## 2018-09-08 LAB
A/G RATIO: 0.9 (ref 1.1–2.2)
ALBUMIN SERPL-MCNC: 3.7 G/DL (ref 3.4–5)
ALP BLD-CCNC: 129 U/L (ref 40–129)
ALT SERPL-CCNC: 46 U/L (ref 10–40)
ANION GAP SERPL CALCULATED.3IONS-SCNC: 15 MMOL/L (ref 3–16)
AST SERPL-CCNC: 59 U/L (ref 15–37)
BACTERIA: ABNORMAL /HPF
BASOPHILS ABSOLUTE: 0.1 K/UL (ref 0–0.2)
BASOPHILS RELATIVE PERCENT: 0.6 %
BILIRUB SERPL-MCNC: 0.7 MG/DL (ref 0–1)
BILIRUBIN URINE: NEGATIVE
BLOOD, URINE: ABNORMAL
BUN BLDV-MCNC: 29 MG/DL (ref 7–20)
CALCIUM SERPL-MCNC: 9.2 MG/DL (ref 8.3–10.6)
CHLORIDE BLD-SCNC: 100 MMOL/L (ref 99–110)
CLARITY: ABNORMAL
CO2: 23 MMOL/L (ref 21–32)
COLOR: YELLOW
CREAT SERPL-MCNC: 1.2 MG/DL (ref 0.6–1.2)
EOSINOPHILS ABSOLUTE: 0.1 K/UL (ref 0–0.6)
EOSINOPHILS RELATIVE PERCENT: 0.5 %
EPITHELIAL CELLS, UA: ABNORMAL /HPF
GFR AFRICAN AMERICAN: 51
GFR NON-AFRICAN AMERICAN: 43
GLOBULIN: 4.1 G/DL
GLUCOSE BLD-MCNC: 112 MG/DL (ref 70–99)
GLUCOSE URINE: NEGATIVE MG/DL
HCT VFR BLD CALC: 36.2 % (ref 36–48)
HEMOGLOBIN: 11.7 G/DL (ref 12–16)
INR BLD: 1.06 (ref 0.86–1.14)
KETONES, URINE: NEGATIVE MG/DL
LACTIC ACID, SEPSIS: 1.5 MMOL/L (ref 0.4–1.9)
LACTIC ACID, SEPSIS: 2.3 MMOL/L (ref 0.4–1.9)
LEUKOCYTE ESTERASE, URINE: NEGATIVE
LYMPHOCYTES ABSOLUTE: 0.5 K/UL (ref 1–5.1)
LYMPHOCYTES RELATIVE PERCENT: 2.7 %
MCH RBC QN AUTO: 28.7 PG (ref 26–34)
MCHC RBC AUTO-ENTMCNC: 32.4 G/DL (ref 31–36)
MCV RBC AUTO: 88.6 FL (ref 80–100)
MICROSCOPIC EXAMINATION: YES
MONOCYTES ABSOLUTE: 1 K/UL (ref 0–1.3)
MONOCYTES RELATIVE PERCENT: 5.6 %
NEUTROPHILS ABSOLUTE: 15.3 K/UL (ref 1.7–7.7)
NEUTROPHILS RELATIVE PERCENT: 90.6 %
NITRITE, URINE: POSITIVE
PDW BLD-RTO: 15.4 % (ref 12.4–15.4)
PH UA: 7
PLATELET # BLD: 198 K/UL (ref 135–450)
PMV BLD AUTO: 8.4 FL (ref 5–10.5)
POTASSIUM REFLEX MAGNESIUM: 4.3 MMOL/L (ref 3.5–5.1)
PRO-BNP: 1370 PG/ML (ref 0–449)
PROTEIN UA: NEGATIVE MG/DL
PROTHROMBIN TIME: 12.1 SEC (ref 9.8–13)
RBC # BLD: 4.08 M/UL (ref 4–5.2)
RBC UA: ABNORMAL /HPF (ref 0–2)
SODIUM BLD-SCNC: 138 MMOL/L (ref 136–145)
SPECIFIC GRAVITY UA: 1.01
TOTAL PROTEIN: 7.8 G/DL (ref 6.4–8.2)
TROPONIN: 0.01 NG/ML
URINE TYPE: ABNORMAL
UROBILINOGEN, URINE: 1 E.U./DL
WBC # BLD: 16.9 K/UL (ref 4–11)
WBC UA: ABNORMAL /HPF (ref 0–5)

## 2018-09-08 PROCEDURE — 6360000002 HC RX W HCPCS: Performed by: INTERNAL MEDICINE

## 2018-09-08 PROCEDURE — 87801 DETECT AGNT MULT DNA AMPLI: CPT

## 2018-09-08 PROCEDURE — 87086 URINE CULTURE/COLONY COUNT: CPT

## 2018-09-08 PROCEDURE — 85025 COMPLETE CBC W/AUTO DIFF WBC: CPT

## 2018-09-08 PROCEDURE — 2580000003 HC RX 258: Performed by: INTERNAL MEDICINE

## 2018-09-08 PROCEDURE — 96360 HYDRATION IV INFUSION INIT: CPT

## 2018-09-08 PROCEDURE — 87077 CULTURE AEROBIC IDENTIFY: CPT

## 2018-09-08 PROCEDURE — 93010 ELECTROCARDIOGRAM REPORT: CPT | Performed by: INTERNAL MEDICINE

## 2018-09-08 PROCEDURE — 6370000000 HC RX 637 (ALT 250 FOR IP): Performed by: EMERGENCY MEDICINE

## 2018-09-08 PROCEDURE — 84484 ASSAY OF TROPONIN QUANT: CPT

## 2018-09-08 PROCEDURE — 87186 SC STD MICRODIL/AGAR DIL: CPT

## 2018-09-08 PROCEDURE — 6370000000 HC RX 637 (ALT 250 FOR IP): Performed by: INTERNAL MEDICINE

## 2018-09-08 PROCEDURE — 2060000000 HC ICU INTERMEDIATE R&B

## 2018-09-08 PROCEDURE — 2580000003 HC RX 258: Performed by: EMERGENCY MEDICINE

## 2018-09-08 PROCEDURE — 96361 HYDRATE IV INFUSION ADD-ON: CPT

## 2018-09-08 PROCEDURE — 93005 ELECTROCARDIOGRAM TRACING: CPT | Performed by: EMERGENCY MEDICINE

## 2018-09-08 PROCEDURE — 85610 PROTHROMBIN TIME: CPT

## 2018-09-08 PROCEDURE — 80053 COMPREHEN METABOLIC PANEL: CPT

## 2018-09-08 PROCEDURE — 99285 EMERGENCY DEPT VISIT HI MDM: CPT

## 2018-09-08 PROCEDURE — 71045 X-RAY EXAM CHEST 1 VIEW: CPT

## 2018-09-08 PROCEDURE — 83605 ASSAY OF LACTIC ACID: CPT

## 2018-09-08 PROCEDURE — 81001 URINALYSIS AUTO W/SCOPE: CPT

## 2018-09-08 PROCEDURE — 83880 ASSAY OF NATRIURETIC PEPTIDE: CPT

## 2018-09-08 PROCEDURE — 87040 BLOOD CULTURE FOR BACTERIA: CPT

## 2018-09-08 RX ORDER — SODIUM CHLORIDE 0.9 % (FLUSH) 0.9 %
10 SYRINGE (ML) INJECTION EVERY 12 HOURS SCHEDULED
Status: DISCONTINUED | OUTPATIENT
Start: 2018-09-08 | End: 2018-09-10 | Stop reason: HOSPADM

## 2018-09-08 RX ORDER — PANTOPRAZOLE SODIUM 40 MG/1
40 TABLET, DELAYED RELEASE ORAL DAILY
Status: DISCONTINUED | OUTPATIENT
Start: 2018-09-08 | End: 2018-09-10 | Stop reason: HOSPADM

## 2018-09-08 RX ORDER — CETIRIZINE HYDROCHLORIDE 10 MG/1
5 TABLET ORAL DAILY
Status: DISCONTINUED | OUTPATIENT
Start: 2018-09-08 | End: 2018-09-10 | Stop reason: HOSPADM

## 2018-09-08 RX ORDER — ASPIRIN 81 MG/1
81 TABLET ORAL DAILY
Status: DISCONTINUED | OUTPATIENT
Start: 2018-09-08 | End: 2018-09-10 | Stop reason: HOSPADM

## 2018-09-08 RX ORDER — LISINOPRIL 10 MG/1
10 TABLET ORAL DAILY
Status: DISCONTINUED | OUTPATIENT
Start: 2018-09-08 | End: 2018-09-10 | Stop reason: HOSPADM

## 2018-09-08 RX ORDER — SODIUM CHLORIDE 0.9 % (FLUSH) 0.9 %
10 SYRINGE (ML) INJECTION PRN
Status: DISCONTINUED | OUTPATIENT
Start: 2018-09-08 | End: 2018-09-10 | Stop reason: HOSPADM

## 2018-09-08 RX ORDER — FUROSEMIDE 40 MG/1
40 TABLET ORAL DAILY
Status: DISCONTINUED | OUTPATIENT
Start: 2018-09-08 | End: 2018-09-10 | Stop reason: HOSPADM

## 2018-09-08 RX ORDER — 0.9 % SODIUM CHLORIDE 0.9 %
30 INTRAVENOUS SOLUTION INTRAVENOUS ONCE
Status: COMPLETED | OUTPATIENT
Start: 2018-09-08 | End: 2018-09-08

## 2018-09-08 RX ORDER — ONDANSETRON 2 MG/ML
4 INJECTION INTRAMUSCULAR; INTRAVENOUS EVERY 6 HOURS PRN
Status: DISCONTINUED | OUTPATIENT
Start: 2018-09-08 | End: 2018-09-10 | Stop reason: HOSPADM

## 2018-09-08 RX ORDER — IPRATROPIUM BROMIDE AND ALBUTEROL SULFATE 2.5; .5 MG/3ML; MG/3ML
1 SOLUTION RESPIRATORY (INHALATION) ONCE
Status: COMPLETED | OUTPATIENT
Start: 2018-09-08 | End: 2018-09-08

## 2018-09-08 RX ORDER — ATORVASTATIN CALCIUM 10 MG/1
20 TABLET, FILM COATED ORAL DAILY
Status: DISCONTINUED | OUTPATIENT
Start: 2018-09-08 | End: 2018-09-10 | Stop reason: HOSPADM

## 2018-09-08 RX ORDER — SULFAMETHOXAZOLE AND TRIMETHOPRIM 800; 160 MG/1; MG/1
1 TABLET ORAL ONCE
Status: COMPLETED | OUTPATIENT
Start: 2018-09-08 | End: 2018-09-08

## 2018-09-08 RX ORDER — CARVEDILOL 6.25 MG/1
6.25 TABLET ORAL 2 TIMES DAILY WITH MEALS
Status: DISCONTINUED | OUTPATIENT
Start: 2018-09-08 | End: 2018-09-10 | Stop reason: HOSPADM

## 2018-09-08 RX ADMIN — CETIRIZINE HYDROCHLORIDE 5 MG: 10 TABLET ORAL at 16:39

## 2018-09-08 RX ADMIN — SODIUM CHLORIDE 1824 ML: 9 INJECTION, SOLUTION INTRAVENOUS at 11:36

## 2018-09-08 RX ADMIN — Medication 10 ML: at 20:47

## 2018-09-08 RX ADMIN — LISINOPRIL 10 MG: 10 TABLET ORAL at 16:39

## 2018-09-08 RX ADMIN — ENOXAPARIN SODIUM 30 MG: 30 INJECTION SUBCUTANEOUS at 16:39

## 2018-09-08 RX ADMIN — PANTOPRAZOLE SODIUM 40 MG: 40 TABLET, DELAYED RELEASE ORAL at 16:38

## 2018-09-08 RX ADMIN — AMPICILLIN SODIUM AND SULBACTAM SODIUM 1.5 G: 1; .5 INJECTION, POWDER, FOR SOLUTION INTRAMUSCULAR; INTRAVENOUS at 16:39

## 2018-09-08 RX ADMIN — IPRATROPIUM BROMIDE AND ALBUTEROL SULFATE 1 AMPULE: .5; 3 SOLUTION RESPIRATORY (INHALATION) at 09:51

## 2018-09-08 RX ADMIN — ATORVASTATIN CALCIUM 20 MG: 10 TABLET, FILM COATED ORAL at 16:38

## 2018-09-08 RX ADMIN — CARVEDILOL 6.25 MG: 6.25 TABLET, FILM COATED ORAL at 16:38

## 2018-09-08 RX ADMIN — ASPIRIN 81 MG: 81 TABLET, COATED ORAL at 16:39

## 2018-09-08 RX ADMIN — SULFAMETHOXAZOLE AND TRIMETHOPRIM 1 TABLET: 800; 160 TABLET ORAL at 11:36

## 2018-09-08 RX ADMIN — AMPICILLIN SODIUM AND SULBACTAM SODIUM 1.5 G: 1; .5 INJECTION, POWDER, FOR SOLUTION INTRAMUSCULAR; INTRAVENOUS at 20:47

## 2018-09-08 RX ADMIN — FUROSEMIDE 40 MG: 40 TABLET ORAL at 16:38

## 2018-09-08 ASSESSMENT — ENCOUNTER SYMPTOMS
BACK PAIN: 0
COUGH: 1
VOMITING: 0
DIARRHEA: 0
NAUSEA: 0
SHORTNESS OF BREATH: 1
SORE THROAT: 0
EYE DISCHARGE: 0
ABDOMINAL PAIN: 0

## 2018-09-08 ASSESSMENT — PAIN SCALES - GENERAL: PAINLEVEL_OUTOF10: 0

## 2018-09-08 NOTE — PROGRESS NOTES
Patient resting in bed, family feeding her supper. No distress noted. Call light in reach. Will continue to monitor.

## 2018-09-08 NOTE — ED PROVIDER NOTES
carvedilol (COREG) 6.25 MG tablet Take 1 tablet by mouth 2 times daily (with meals)  Qty: 180 tablet, Refills: 3      aspirin 81 MG tablet Take 81 mg by mouth daily. fexofenadine (ALLEGRA) 180 MG tablet Take 180 mg by mouth daily as needed. ALLERGIES     Morphine; Warfarin; Cefuroxime; Levofloxacin [levofloxacin]; and Azithromycin    FAMILY HISTORY       Family History   Problem Relation Age of Onset    Heart Disease Mother     High Blood Pressure Mother           SOCIAL HISTORY       Social History     Social History    Marital status:      Spouse name: N/A    Number of children: N/A    Years of education: N/A     Social History Main Topics    Smoking status: Passive Smoke Exposure - Never Smoker    Smokeless tobacco: Never Used    Alcohol use No    Drug use: No    Sexual activity: No     Other Topics Concern    None     Social History Narrative    None       SCREENINGS    Eran Coma Scale  Eye Opening: Spontaneous  Best Verbal Response: Oriented  Best Motor Response: Obeys commands  Maupin Coma Scale Score: 15        PHYSICAL EXAM    (up to 7 for level 4, 8 or more for level 5)     ED Triage Vitals [09/08/18 0845]   BP Temp Temp Source Pulse Resp SpO2 Height Weight   126/76 98.1 °F (36.7 °C) Oral 106 (!) 33 92 % -- 134 lb (60.8 kg)       Physical Exam   Constitutional: She is oriented to person, place, and time. She appears well-developed and well-nourished. No distress. HENT:   Head: Normocephalic and atraumatic. Right Ear: External ear normal.   Left Ear: External ear normal.   Nose: Nose normal.   Mouth/Throat: Oropharynx is clear and moist. No oropharyngeal exudate. Eyes: Pupils are equal, round, and reactive to light. Conjunctivae are normal.   Neck: Normal range of motion. Neck supple. Cardiovascular: Normal rate, regular rhythm, normal heart sounds and intact distal pulses. Exam reveals no gallop and no friction rub. No murmur heard.   Pulmonary/Chest: American 51 (A) >60    Calcium 9.2 8.3 - 10.6 mg/dL    Total Protein 7.8 6.4 - 8.2 g/dL    Alb 3.7 3.4 - 5.0 g/dL    Albumin/Globulin Ratio 0.9 (L) 1.1 - 2.2    Total Bilirubin 0.7 0.0 - 1.0 mg/dL    Alkaline Phosphatase 129 40 - 129 U/L    ALT 46 (H) 10 - 40 U/L    AST 59 (H) 15 - 37 U/L    Globulin 4.1 g/dL   Urinalysis   Result Value Ref Range    Color, UA Yellow Straw/Yellow    Clarity, UA SL CLOUDY (A) Clear    Glucose, Ur Negative Negative mg/dL    Bilirubin Urine Negative Negative    Ketones, Urine Negative Negative mg/dL    Specific Gravity, UA 1.010 1.005 - 1.030    Blood, Urine TRACE-INTACT (A) Negative    pH, UA 7.0 5.0 - 8.0    Protein, UA Negative Negative mg/dL    Urobilinogen, Urine 1.0 <2.0 E.U./dL    Nitrite, Urine POSITIVE (A) Negative    Leukocyte Esterase, Urine Negative Negative    Microscopic Examination YES     Urine Type Not Specified    Protime-INR   Result Value Ref Range    Protime 12.1 9.8 - 13.0 sec    INR 1.06 0.86 - 1.14   Troponin   Result Value Ref Range    Troponin 0.01 <0.01 ng/mL   Brain Natriuretic Peptide   Result Value Ref Range    Pro-BNP 1,370 (H) 0 - 449 pg/mL   Microscopic Urinalysis   Result Value Ref Range    WBC, UA 6-10 (A) 0 - 5 /HPF    RBC, UA 5-10 (A) 0 - 2 /HPF    Epi Cells 0-2 /HPF    Bacteria, UA 2+ (A) /HPF   EKG 12 lead   Result Value Ref Range    Ventricular Rate 104 BPM    Atrial Rate 104 BPM    P-R Interval 100 ms    QRS Duration 88 ms    Q-T Interval 354 ms    QTc Calculation (Bazett) 465 ms    P Axis 37 degrees    R Axis -9 degrees    T Axis 32 degrees    Diagnosis       Sinus tachycardia with short PRInferior infarct , age undeterminedAbnormal ECGConfirmed by Contreras Araujo MD, Sandy Nguyen ([email or web address]) on 9/8/2018 12:14:44 PM       All other labs were within normal range or not returned as of this dictation. EKG:  All EKG's are interpreted by the Emergency Department Physician who either signs or Co-signs this chart in the absence of a cardiologist.  Please see their note 30 mL (not administered)   ondansetron (ZOFRAN) injection 4 mg (not administered)   enoxaparin (LOVENOX) injection 30 mg (not administered)   ipratropium-albuterol (DUONEB) nebulizer solution 1 ampule (1 ampule Inhalation Given 9/8/18 8185)   sulfamethoxazole-trimethoprim (BACTRIM DS;SEPTRA DS) 800-160 MG per tablet 1 tablet (1 tablet Oral Given 9/8/18 0316)   0.9 % sodium chloride bolus (1,824 mLs Intravenous New Bag 9/8/18 1136)     Patient clinically sounds like pneumonia. Sepsis work up was initiated. Patient's workup is consistent with pneumonia. She is requiring oxygen to maintain her oxygen saturations. I've started her on the sepsis bundle with community acquired pneumonia antibiotics. Hospitalists will admit primarily. I spoke with Dr. Aida Taylor. We thoroughly discussed the history, physical exam, laboratory and imaging studies, as well as, emergency department course. Based upon that discussion, we've decided to admit Curly Grewal for further observation and evaluation of Murl Najjar Wells's dyspnea. As I have deemed necessary from their history, physical, and studies, I have considered and evaluated Curly Grewal for the following diagnoses:  ACUTE CORONARY SYNDROME, CHRONIC OBSTRUCTIVE PULMONARY DISEASE, CONGESTIVE HEART FAILURE, PERICARDIAL TAMPONADE, PNEUMONIA, PNEUMOTHORAX, PULMONARY EMBOLISM, SEPSIS, and THORACIC DISSECTION. FINAL IMPRESSION      1.  Pneumonia of left lower lobe due to infectious organism [facility])          290Lizzie Ceja Admitted 09/08/2018 12:49:52 PM    (Please note that portions of this note were completed with a voice recognition program.  Efforts were made to edit the dictations but occasionally words are mis-transcribed.)    Bernarda Mace MD (electronically signed)              Bernarda Mace MD  09/08/18 0740

## 2018-09-08 NOTE — PROGRESS NOTES
Patient admitted to PCU room 327 bed 1 at this time. Lungs decreased with crackles in bases. BS+. IVF bolus infusing from ER. Family at bedside. No distress noted. Call light in reach. Will continue to monitor.

## 2018-09-08 NOTE — ED NOTES
Hourly rounding completed. Patient verbalized no wants or needs at this time. Will continue to monitor.        Niharika Thurman RN  09/08/18 8964

## 2018-09-08 NOTE — PROGRESS NOTES
4 Eyes Skin Assessment     The patient is being assess for   Admission    I agree that 2 RN's have performed a thorough Head to Toe Skin Assessment on the patient. ALL assessment sites listed below have been assessed. Areas assessed by both nurses:   [x]   Head, Face, and Ears   [x]   Shoulders, Back, and Chest, Abdomen  [x]   Arms, Elbows, and Hands   [x]   Coccyx, Sacrum, and Ischium  [x]   Legs, Feet, and Heels            **SHARE this note so that the co-signing nurse is able to place an eSignature**    Co-signer eSignature: Electronically signed by Cher Osgood, RN on 9/8/18 at 6:57 PM    Does the Patient have Skin Breakdown?   No          Casey Prevention initiated:  No   Wound Care Orders initiated:  No      [facility] nurse consulted for Pressure Injury (Stage 3,4, Unstageable, DTI, NWPT, Complex wounds)and New or Established Ostomies:  No      Primary Nurse eSignature: Electronically signed by Chris Gruber RN on 9/8/18 at 6:56 PM

## 2018-09-08 NOTE — ED NOTES
Pt unable to give urine sample at this time. Pt states she is ok with straight catheter.       Verenice Read RN  09/08/18 0394

## 2018-09-08 NOTE — ED NOTES
Sterile straight catheter inserted and removed. Pt tolerated well.       Joel Meza RN  09/08/18 2265

## 2018-09-09 LAB
ALBUMIN SERPL-MCNC: 3 G/DL (ref 3.4–5)
ANION GAP SERPL CALCULATED.3IONS-SCNC: 12 MMOL/L (ref 3–16)
BASOPHILS ABSOLUTE: 0.1 K/UL (ref 0–0.2)
BASOPHILS RELATIVE PERCENT: 1.4 %
BUN BLDV-MCNC: 23 MG/DL (ref 7–20)
CALCIUM SERPL-MCNC: 8.4 MG/DL (ref 8.3–10.6)
CHLORIDE BLD-SCNC: 104 MMOL/L (ref 99–110)
CO2: 24 MMOL/L (ref 21–32)
CREAT SERPL-MCNC: 1.2 MG/DL (ref 0.6–1.2)
EOSINOPHILS ABSOLUTE: 0.2 K/UL (ref 0–0.6)
EOSINOPHILS RELATIVE PERCENT: 2.2 %
GFR AFRICAN AMERICAN: 51
GFR NON-AFRICAN AMERICAN: 43
GLUCOSE BLD-MCNC: 86 MG/DL (ref 70–99)
HCT VFR BLD CALC: 30.8 % (ref 36–48)
HEMOGLOBIN: 10.6 G/DL (ref 12–16)
LYMPHOCYTES ABSOLUTE: 1.5 K/UL (ref 1–5.1)
LYMPHOCYTES RELATIVE PERCENT: 18 %
MAGNESIUM: 2.3 MG/DL (ref 1.8–2.4)
MCH RBC QN AUTO: 30.2 PG (ref 26–34)
MCHC RBC AUTO-ENTMCNC: 34.3 G/DL (ref 31–36)
MCV RBC AUTO: 88.2 FL (ref 80–100)
MONOCYTES ABSOLUTE: 0.8 K/UL (ref 0–1.3)
MONOCYTES RELATIVE PERCENT: 9.9 %
NEUTROPHILS ABSOLUTE: 5.8 K/UL (ref 1.7–7.7)
NEUTROPHILS RELATIVE PERCENT: 68.5 %
PDW BLD-RTO: 15.6 % (ref 12.4–15.4)
PHOSPHORUS: 2.9 MG/DL (ref 2.5–4.9)
PLATELET # BLD: 175 K/UL (ref 135–450)
PMV BLD AUTO: 7.5 FL (ref 5–10.5)
POTASSIUM SERPL-SCNC: 3.7 MMOL/L (ref 3.5–5.1)
RBC # BLD: 3.5 M/UL (ref 4–5.2)
REPORT: NORMAL
SODIUM BLD-SCNC: 140 MMOL/L (ref 136–145)
WBC # BLD: 8.4 K/UL (ref 4–11)

## 2018-09-09 PROCEDURE — 94761 N-INVAS EAR/PLS OXIMETRY MLT: CPT

## 2018-09-09 PROCEDURE — 83735 ASSAY OF MAGNESIUM: CPT

## 2018-09-09 PROCEDURE — 36415 COLL VENOUS BLD VENIPUNCTURE: CPT

## 2018-09-09 PROCEDURE — 6360000002 HC RX W HCPCS: Performed by: INTERNAL MEDICINE

## 2018-09-09 PROCEDURE — 2700000000 HC OXYGEN THERAPY PER DAY

## 2018-09-09 PROCEDURE — 94664 DEMO&/EVAL PT USE INHALER: CPT

## 2018-09-09 PROCEDURE — 6370000000 HC RX 637 (ALT 250 FOR IP): Performed by: INTERNAL MEDICINE

## 2018-09-09 PROCEDURE — 2580000003 HC RX 258: Performed by: INTERNAL MEDICINE

## 2018-09-09 PROCEDURE — 94640 AIRWAY INHALATION TREATMENT: CPT

## 2018-09-09 PROCEDURE — 87040 BLOOD CULTURE FOR BACTERIA: CPT

## 2018-09-09 PROCEDURE — 85025 COMPLETE CBC W/AUTO DIFF WBC: CPT

## 2018-09-09 PROCEDURE — 80069 RENAL FUNCTION PANEL: CPT

## 2018-09-09 PROCEDURE — 2060000000 HC ICU INTERMEDIATE R&B

## 2018-09-09 RX ORDER — ALBUTEROL SULFATE 2.5 MG/3ML
2.5 SOLUTION RESPIRATORY (INHALATION) EVERY 4 HOURS PRN
Status: DISCONTINUED | OUTPATIENT
Start: 2018-09-09 | End: 2018-09-10 | Stop reason: HOSPADM

## 2018-09-09 RX ORDER — IPRATROPIUM BROMIDE AND ALBUTEROL SULFATE 2.5; .5 MG/3ML; MG/3ML
1 SOLUTION RESPIRATORY (INHALATION)
Status: DISCONTINUED | OUTPATIENT
Start: 2018-09-09 | End: 2018-09-09

## 2018-09-09 RX ORDER — IPRATROPIUM BROMIDE AND ALBUTEROL SULFATE 2.5; .5 MG/3ML; MG/3ML
1 SOLUTION RESPIRATORY (INHALATION) 4 TIMES DAILY
Status: DISCONTINUED | OUTPATIENT
Start: 2018-09-09 | End: 2018-09-10 | Stop reason: HOSPADM

## 2018-09-09 RX ORDER — ALBUTEROL SULFATE 2.5 MG/3ML
2.5 SOLUTION RESPIRATORY (INHALATION) EVERY 6 HOURS PRN
Status: DISCONTINUED | OUTPATIENT
Start: 2018-09-09 | End: 2018-09-09

## 2018-09-09 RX ADMIN — ASPIRIN 81 MG: 81 TABLET, COATED ORAL at 09:00

## 2018-09-09 RX ADMIN — ATORVASTATIN CALCIUM 20 MG: 10 TABLET, FILM COATED ORAL at 09:00

## 2018-09-09 RX ADMIN — CARVEDILOL 6.25 MG: 6.25 TABLET, FILM COATED ORAL at 16:32

## 2018-09-09 RX ADMIN — AMPICILLIN SODIUM AND SULBACTAM SODIUM 1.5 G: 1; .5 INJECTION, POWDER, FOR SOLUTION INTRAMUSCULAR; INTRAVENOUS at 15:43

## 2018-09-09 RX ADMIN — CETIRIZINE HYDROCHLORIDE 5 MG: 10 TABLET ORAL at 09:00

## 2018-09-09 RX ADMIN — IPRATROPIUM BROMIDE AND ALBUTEROL SULFATE 1 AMPULE: .5; 3 SOLUTION RESPIRATORY (INHALATION) at 19:24

## 2018-09-09 RX ADMIN — CARVEDILOL 6.25 MG: 6.25 TABLET, FILM COATED ORAL at 09:00

## 2018-09-09 RX ADMIN — PANTOPRAZOLE SODIUM 40 MG: 40 TABLET, DELAYED RELEASE ORAL at 09:00

## 2018-09-09 RX ADMIN — LISINOPRIL 10 MG: 10 TABLET ORAL at 09:00

## 2018-09-09 RX ADMIN — AMPICILLIN SODIUM AND SULBACTAM SODIUM 1.5 G: 1; .5 INJECTION, POWDER, FOR SOLUTION INTRAMUSCULAR; INTRAVENOUS at 21:06

## 2018-09-09 RX ADMIN — AMPICILLIN SODIUM AND SULBACTAM SODIUM 1.5 G: 1; .5 INJECTION, POWDER, FOR SOLUTION INTRAMUSCULAR; INTRAVENOUS at 09:00

## 2018-09-09 RX ADMIN — ENOXAPARIN SODIUM 30 MG: 30 INJECTION SUBCUTANEOUS at 09:00

## 2018-09-09 RX ADMIN — AMPICILLIN SODIUM AND SULBACTAM SODIUM 1.5 G: 1; .5 INJECTION, POWDER, FOR SOLUTION INTRAMUSCULAR; INTRAVENOUS at 02:48

## 2018-09-09 RX ADMIN — IPRATROPIUM BROMIDE AND ALBUTEROL SULFATE 1 AMPULE: .5; 3 SOLUTION RESPIRATORY (INHALATION) at 07:50

## 2018-09-09 RX ADMIN — Medication 10 ML: at 21:07

## 2018-09-09 RX ADMIN — FUROSEMIDE 40 MG: 40 TABLET ORAL at 09:00

## 2018-09-09 RX ADMIN — IPRATROPIUM BROMIDE AND ALBUTEROL SULFATE 1 AMPULE: .5; 3 SOLUTION RESPIRATORY (INHALATION) at 11:30

## 2018-09-09 RX ADMIN — IPRATROPIUM BROMIDE AND ALBUTEROL SULFATE 1 AMPULE: .5; 3 SOLUTION RESPIRATORY (INHALATION) at 16:03

## 2018-09-09 ASSESSMENT — PAIN SCALES - GENERAL
PAINLEVEL_OUTOF10: 0
PAINLEVEL_OUTOF10: 0

## 2018-09-09 NOTE — PROGRESS NOTES
d. Can demonstrate a 23 second inspiratory hold  4. Bronchodilators will be administered via Hand Held Nebulizer [facility]) to patients when ANY of the following criteria are met  a. Incognizant or uncooperative          b. Patients treated with HHN at Home        c. Unable to demonstrate proper use of MDI with spacer     d. RR > 30 bpm   5. Bronchodilators will be delivered via Metered Dose Inhaler (MDI), HHN, Aerogen to intubated patients on mechanical ventilation. 6. Inhalation medication orders will be delivered and/or substituted as outlined below. Aerosolized Medications Ordering and Administration Guidelines:    1. All Medications will be ordered by a physician, and their frequency and/or modality will be adjusted as defined by the patients Respiratory Severity Index (RSI) score. 2. If the patient does not have documented COPD, consider discontinuing anticholinergics when RSI is less than 9.  3. If the bronchospasm worsens (increased RSI), then the bronchodilator frequency can be increased to a maximum of every 4 hours. If greater than every 4 hours is required, the physician will be contacted. 4. If the bronchospasm improves, the frequency of the bronchodilator can be decreased, based on the patient's RSI, but not less than home treatment regimen frequency. 5. Bronchodilator(s) will be discontinued if patient has a RSI less than 9 and has received no scheduled or as needed treatment for 72  Hrs. Patients Ordered on a Mucolytic Agent:    1. Must always be administered with a bronchodilator. 2. Discontinue if patient experiences worsened bronchospasm, or secretions have lessened to the point that the patient is able to clear them with a cough. Anti-inflammatory and Combination Medications:    1.  If the patient lacks prior history of lung disease, is not using inhaled anti-inflammatory medication at home, and lacks wheezing by examination or by history for at least 24 hours, contact physician for possible discontinuation.

## 2018-09-09 NOTE — PROGRESS NOTES
Hospitalist Progress Note      PCP: Nichol Chery MD    Date of Admission: 9/8/2018    Chief Complaint: feeling unwell, congested. Subjective:     Feels better this am. Still some cough. No sputum. No chest pain. Medications:  Reviewed    Infusion Medications   Scheduled Medications    ipratropium-albuterol  1 ampule Inhalation 4x daily    aspirin  81 mg Oral Daily    atorvastatin  20 mg Oral Daily    carvedilol  6.25 mg Oral BID WC    cetirizine  5 mg Oral Daily    furosemide  40 mg Oral Daily    lisinopril  10 mg Oral Daily    pantoprazole  40 mg Oral Daily    ampicillin-sulbactam  1.5 g Intravenous Q6H    sodium chloride flush  10 mL Intravenous 2 times per day    enoxaparin  30 mg Subcutaneous Daily     PRN Meds: albuterol, sodium chloride flush, magnesium hydroxide, ondansetron      Intake/Output Summary (Last 24 hours) at 09/09/18 1124  Last data filed at 09/09/18 0936   Gross per 24 hour   Intake             2860 ml   Output             1020 ml   Net             1840 ml       Physical Exam Performed:    /69   Pulse 78   Temp 97.7 °F (36.5 °C) (Oral)   Resp 17   Ht 5' 2\" (1.575 m)   Wt 140 lb 11.2 oz (63.8 kg)   SpO2 100%   BMI 25.73 kg/m²     General appearance: No apparent distress, appears stated age and cooperative. HEENT: Pupils equal, round, and reactive to light. Conjunctivae/corneas clear. Neck: Supple, with full range of motion. No jugular venous distention. Trachea midline. Respiratory:  Mild basilar rales, congested left lung. Cardiovascular: Regular rate and rhythm with normal S1/S2 without murmurs, rubs or gallops. Abdomen: Soft, non-tender, non-distended with normal bowel sounds. Musculoskeletal: No clubbing, cyanosis or edema bilaterally. Full range of motion without deformity. Skin: Skin color, texture, turgor normal.  No rashes or lesions. Neurologic:  Neurovascularly intact without any focal sensory/motor deficits.  Cranial nerves: II-XII intact, grossly non-focal.  Psychiatric: Alert and oriented, thought content appropriate, normal insight  Capillary Refill: Brisk,< 3 seconds   Peripheral Pulses: +2 palpable, equal bilaterally       Labs:   Recent Labs      09/08/18   0948  09/09/18   0452   WBC  16.9*  8.4   HGB  11.7*  10.6*   HCT  36.2  30.8*   PLT  198  175     Recent Labs      09/08/18   0948  09/09/18   0452   NA  138  140   K  4.3  3.7   CL  100  104   CO2  23  24   BUN  29*  23*   CREATININE  1.2  1.2   CALCIUM  9.2  8.4   PHOS   --   2.9     Recent Labs      09/08/18   0948   AST  59*   ALT  46*   BILITOT  0.7   ALKPHOS  129     Recent Labs      09/08/18   0948   INR  1.06     Recent Labs      09/08/18   0948   TROPONINI  0.01       Urinalysis:    Lab Results   Component Value Date    NITRU POSITIVE 09/08/2018    WBCUA 6-10 09/08/2018    BACTERIA 2+ 09/08/2018    RBCUA 5-10 09/08/2018    BLOODU TRACE-INTACT 09/08/2018    SPECGRAV 1.010 09/08/2018    GLUCOSEU Negative 09/08/2018    GLUCOSEU NEGATIVE 01/10/2012       Radiology:  XR CHEST PORTABLE   Final Result   Mild pulmonary edema. Small left lower lobe opacity is nonspecific but may represent atelectasis. Assessment/Plan:    Active Hospital Problems    Diagnosis Date Noted    Pneumonia [J18.9] 09/08/2018       Sepsis with severe sepsis. Probable due to G- bacteremia. Pneumonia seems less likely at this time. Source probable  origin. Plan:    - cont unasyn   - pending culture speciation.    - LA normal.       Acute Hypox RF resolved. RA today. UTI POA -   Cont Iv Abx as above. Culture pending. G- Bacteremia - probable due to above. Cont Iv Abx. Repeat blood culture      Lactic acidosis - resolved       CAD stable -   Asa, coreg, lipitor, lisinopril. CKDIII - stable.        DVT Prophylaxis: lovenox  Diet: DIET CARDIAC;  Code Status: Full Code      Dispo - cc    Moises Hassan MD

## 2018-09-10 VITALS
DIASTOLIC BLOOD PRESSURE: 83 MMHG | TEMPERATURE: 97.6 F | HEIGHT: 62 IN | SYSTOLIC BLOOD PRESSURE: 139 MMHG | RESPIRATION RATE: 16 BRPM | BODY MASS INDEX: 25.21 KG/M2 | WEIGHT: 137 LBS | OXYGEN SATURATION: 97 % | HEART RATE: 97 BPM

## 2018-09-10 PROBLEM — B96.20 E COLI BACTEREMIA: Status: ACTIVE | Noted: 2018-09-10

## 2018-09-10 PROBLEM — J18.9 PNEUMONIA: Status: RESOLVED | Noted: 2018-09-08 | Resolved: 2018-09-10

## 2018-09-10 PROBLEM — R78.81 E COLI BACTEREMIA: Status: ACTIVE | Noted: 2018-09-10

## 2018-09-10 LAB
ORGANISM: ABNORMAL
URINE CULTURE, ROUTINE: ABNORMAL
URINE CULTURE, ROUTINE: ABNORMAL

## 2018-09-10 PROCEDURE — 6370000000 HC RX 637 (ALT 250 FOR IP): Performed by: INTERNAL MEDICINE

## 2018-09-10 PROCEDURE — 94640 AIRWAY INHALATION TREATMENT: CPT

## 2018-09-10 PROCEDURE — 94761 N-INVAS EAR/PLS OXIMETRY MLT: CPT

## 2018-09-10 PROCEDURE — 99238 HOSP IP/OBS DSCHRG MGMT 30/<: CPT | Performed by: INTERNAL MEDICINE

## 2018-09-10 PROCEDURE — 6360000002 HC RX W HCPCS: Performed by: INTERNAL MEDICINE

## 2018-09-10 PROCEDURE — 94664 DEMO&/EVAL PT USE INHALER: CPT

## 2018-09-10 PROCEDURE — 2580000003 HC RX 258: Performed by: INTERNAL MEDICINE

## 2018-09-10 RX ORDER — AMOXICILLIN 500 MG/1
500 CAPSULE ORAL 2 TIMES DAILY
Qty: 20 CAPSULE | Refills: 0 | Status: SHIPPED | OUTPATIENT
Start: 2018-09-10 | End: 2018-09-20

## 2018-09-10 RX ADMIN — LISINOPRIL 10 MG: 10 TABLET ORAL at 09:19

## 2018-09-10 RX ADMIN — CETIRIZINE HYDROCHLORIDE 5 MG: 10 TABLET ORAL at 09:19

## 2018-09-10 RX ADMIN — AMPICILLIN SODIUM AND SULBACTAM SODIUM 1.5 G: 1; .5 INJECTION, POWDER, FOR SOLUTION INTRAMUSCULAR; INTRAVENOUS at 09:19

## 2018-09-10 RX ADMIN — ATORVASTATIN CALCIUM 20 MG: 10 TABLET, FILM COATED ORAL at 09:19

## 2018-09-10 RX ADMIN — PANTOPRAZOLE SODIUM 40 MG: 40 TABLET, DELAYED RELEASE ORAL at 09:19

## 2018-09-10 RX ADMIN — CARVEDILOL 6.25 MG: 6.25 TABLET, FILM COATED ORAL at 09:19

## 2018-09-10 RX ADMIN — FUROSEMIDE 40 MG: 40 TABLET ORAL at 09:19

## 2018-09-10 RX ADMIN — AMPICILLIN SODIUM AND SULBACTAM SODIUM 1.5 G: 1; .5 INJECTION, POWDER, FOR SOLUTION INTRAMUSCULAR; INTRAVENOUS at 04:00

## 2018-09-10 RX ADMIN — ASPIRIN 81 MG: 81 TABLET, COATED ORAL at 09:19

## 2018-09-10 RX ADMIN — Medication 10 ML: at 09:19

## 2018-09-10 RX ADMIN — ENOXAPARIN SODIUM 30 MG: 30 INJECTION SUBCUTANEOUS at 09:19

## 2018-09-10 RX ADMIN — IPRATROPIUM BROMIDE AND ALBUTEROL SULFATE 1 AMPULE: .5; 3 SOLUTION RESPIRATORY (INHALATION) at 07:30

## 2018-09-10 RX ADMIN — IPRATROPIUM BROMIDE AND ALBUTEROL SULFATE 1 AMPULE: .5; 3 SOLUTION RESPIRATORY (INHALATION) at 12:02

## 2018-09-10 NOTE — DISCHARGE SUMMARY
Name:  Madan Beltran  Room:  /9878-77  MRN:    [id number]    Discharge Summary      This discharge summary is in conjunction with a complete physical exam done on the day of discharge. Discharging Physician: Dr. Maureen Albarado: 2018  Discharge:   9/10/2018     HPI taken from admission H&P:    The patient is a 80 y.o. female with PMH below, presents with SOB/DEWITT, lightheadedness, wheezing, fatigue,  orthopnea, non-productive cough. The above sx have been progressive over the last couple of days. She was concerned that her CHF might be acting up so she had her family bring her to the ER. She has been requiring 2L of O2 here to maintain sats. She is not normally on O2. She was tachypnic in the 30's upon arrival to the ED. Diagnoses this Admission and Hospital Course     Severe sepsis  E Coli UTI  E Coli Bacteremia   - She presented with dyspnea, non productive cough which has now resolved. CXR as below- low suspicion for pneumonia   - LA normal.  WBC trended down. Sepsis resolved. - Received unasyn D#3. Will change to amoxil 500 mg BID x 10 more days and discharge to home      Acute Hypoxic resp failure   - resolved shortly after admission. On RA at discharge     CAD- stable   HTN  - Asa, coreg, lipitor, lisinopril continued     Chronic diastolic CHF  - mild pulm edema on initial CXR. Continued home lasix 40 mg PO daily and appears compensated at discharge    S/p porcine mitral valve replacement    CKDIII   - stable    Procedures (Please Review Full Report for Details)  None     Consults    None     Physical Exam at Discharge:    BP (!) 143/82   Pulse 97   Temp 98.1 °F (36.7 °C) (Axillary)   Resp 16   Ht 5' 2\" (1.575 m)   Wt 137 lb (62.1 kg)   SpO2 96%   BMI 25.06 kg/m²   Gen: No distress. Alert. Eyes: PERRL. No sclera icterus. No conjunctival injection. ENT: No discharge. Pharynx clear. Neck: Trachea midline. Normal thyroid. Resp: No accessory muscle use.  No crackles. No wheezes. No rhonchi. No dullness on percussion. CV: Regular rate. Regular rhythm. No murmur or rub. No edema. GI: Non-tender. Non-distended. No masses. No organomegaly. Normal bowel sounds. No hernia. Skin: Warm and dry. No nodule on exposed extremities. No rash on exposed extremities. Lymph: No cervical LAD. No supraclavicular LAD. M/S: No cyanosis. No joint deformity. No clubbing. Neuro: Awake. Moves all 4 extremities, non focal  Psych: Oriented x 3. No anxiety or agitation.        CBC:   Recent Labs      09/08/18   0948  09/09/18   0452   WBC  16.9*  8.4   HGB  11.7*  10.6*   HCT  36.2  30.8*   MCV  88.6  88.2   PLT  198  175     BMP:   Recent Labs      09/08/18   0948  09/09/18   0452   NA  138  140   K  4.3  3.7   CL  100  104   CO2  23  24   PHOS   --   2.9   BUN  29*  23*   CREATININE  1.2  1.2     LIVER PROFILE:   Recent Labs      09/08/18   0948   AST  59*   ALT  46*   BILITOT  0.7   ALKPHOS  129     PT/INR:   Recent Labs      09/08/18   0948   PROTIME  12.1   INR  1.06     UA:  Recent Labs      09/08/18   1029   COLORU  Yellow   PHUR  7.0   WBCUA  6-10*   RBCUA  5-10*   BACTERIA  2+*   CLARITYU  SL CLOUDY*   SPECGRAV  1.010   LEUKOCYTESUR  Negative   UROBILINOGEN  1.0   BILIRUBINUR  Negative   BLOODU  TRACE-INTACT*   GLUCOSEU  Negative     CULTURES     Blood: E Coli  Urine:   ESCHERICHIA COLI      Antibiotic Interpretation ANDRZEJ Unit   ampicillin Sensitive <=4 mcg/mL   ceFAZolin Sensitive 2 mcg/mL   Comment: NOTE: Cefazolin should only be used for uncomplicated UTI        for E.coli, Klebsiella pneumoniae or Proteus mirabilis.    cefTRIAXone Sensitive <=1 mcg/mL   cefepime Sensitive <=1 mcg/mL   ciprofloxacin Sensitive <=0.5 mcg/mL   gentamicin Sensitive <=2 mcg/mL   levofloxacin Sensitive <=1 mcg/mL   meropenem Sensitive <=0.25 mcg/mL   nitrofurantoin Sensitive <=16 mcg/mL   piperacillin-tazobactam Sensitive 4/4 mcg/mL   tetracycline Sensitive <=2 mcg/mL

## 2018-09-10 NOTE — CARE COORDINATION
DISCHARGE ORDER  Date/Time 9/10/2018 2:19 PM  Completed by: Yovani Dumont, Case Management    Patient Name: Bhargav Casas    : 1931  Admitting Diagnosis: Pneumonia [J18.9]  Admit Date/Time: 2018  8:35 AM    Noted discharge order. Confirmed discharge plan with patient / family (pt and daughter in law): Yes   Discharge Plan: Reviewed chart. Role of discharge planner explained and patient and daughter in law verbalized understanding. Discharge order is noted. Pt is being d/c'd to home today with daughter and son in law. Pt's O2 sats are 96% on RA. Pt and daughter in law decline home care. Pt and daughter in law state that they private pay for a home health aide 5 days a week. Discharge timeout done  with Rafia Ledbetter RN. All discharge needs and concerns addressed. No further discharge needs needed or noted.

## 2018-09-10 NOTE — PROGRESS NOTES
Hospitalist Progress Note      PCP: Lilia Her MD    Date of Admission: 9/8/2018    Chief Complaint: feeling unwell, congested. Subjective:   - feels much better. Would like to go home. Medications:  Reviewed    Infusion Medications   Scheduled Medications    ipratropium-albuterol  1 ampule Inhalation 4x daily    aspirin  81 mg Oral Daily    atorvastatin  20 mg Oral Daily    carvedilol  6.25 mg Oral BID WC    cetirizine  5 mg Oral Daily    furosemide  40 mg Oral Daily    lisinopril  10 mg Oral Daily    pantoprazole  40 mg Oral Daily    ampicillin-sulbactam  1.5 g Intravenous Q6H    sodium chloride flush  10 mL Intravenous 2 times per day    enoxaparin  30 mg Subcutaneous Daily     PRN Meds: albuterol, sodium chloride flush, magnesium hydroxide, ondansetron      Intake/Output Summary (Last 24 hours) at 09/10/18 1214  Last data filed at 09/10/18 0853   Gross per 24 hour   Intake              750 ml   Output             1315 ml   Net             -565 ml       Physical Exam Performed:    BP (!) 143/82   Pulse 97   Temp 98.1 °F (36.7 °C) (Axillary)   Resp 16   Ht 5' 2\" (1.575 m)   Wt 137 lb (62.1 kg)   SpO2 96%   BMI 25.06 kg/m²      Gen: Elderly female. No distress. Alert. Eyes: PERRL. No sclera icterus. No conjunctival injection. ENT: No discharge. Pharynx clear. Neck: No JVD. Trachea midline. Resp: No accessory muscle use. No crackles. No wheezes. No rhonchi. CV: Regular rate. Regular rhythm. No murmur. No rub. No edema. Capillary Refill: Brisk,< 3 seconds   Peripheral Pulses: +2 palpable, equal bilaterally   GI: Non-tender. Non-distended. Normal bowel sounds. Skin: Warm and dry. No nodule on exposed extremities. No rash on exposed extremities. M/S: No cyanosis. No joint deformity. No clubbing. Neuro: Awake. Grossly nonfocal    Psych: Oriented x 3. No anxiety or agitation.        I [facility] have reviewed the chart on [address] and personally interviewed and examined patient, reviewed the data (labs and imaging) and after discussion with my PA formulated the plan. Agree with note with the following edits. Physical exam:    BP (!) 143/82   Pulse 97   Temp 98.1 °F (36.7 °C) (Axillary)   Resp 16   Ht 5' 2\" (1.575 m)   Wt 137 lb (62.1 kg)   SpO2 96%   BMI 25.06 kg/m²     Gen: No distress. Alert. Eyes: PERRL. No sclera icterus. No conjunctival injection. ENT: No discharge. Pharynx clear. Neck: Trachea midline. Normal thyroid. Resp: No accessory muscle use. No crackles. No wheezes. No rhonchi. No dullness on percussion. CV: Regular rate. Regular rhythm. No murmur or rub. No edema. GI: Non-tender. Non-distended. No masses. No organomegaly. Normal bowel sounds. No hernia. Skin: Warm and dry. No nodule on exposed extremities. No rash on exposed extremities. Lymph: No cervical LAD. No supraclavicular LAD. M/S: No cyanosis. No joint deformity. No clubbing. Neuro: Awake. Moves all 4 extremities, non focal  Psych: Oriented x 3. No anxiety or agitation. SHIRA Valdes.       Labs:   Recent Labs      09/08/18 0948  09/09/18   0452   WBC  16.9*  8.4   HGB  11.7*  10.6*   HCT  36.2  30.8*   PLT  198  175     Recent Labs      09/08/18   0948  09/09/18   0452   NA  138  140   K  4.3  3.7   CL  100  104   CO2  23  24   BUN  29*  23*   CREATININE  1.2  1.2   CALCIUM  9.2  8.4   PHOS   --   2.9     Recent Labs      09/08/18 0948   AST  59*   ALT  46*   BILITOT  0.7   ALKPHOS  129     Recent Labs      09/08/18 0948   INR  1.06     Recent Labs      09/08/18 0948   TROPONINI  0.01       Urinalysis:      Lab Results   Component Value Date    NITRU POSITIVE 09/08/2018    WBCUA 6-10 09/08/2018    BACTERIA 2+ 09/08/2018    RBCUA 5-10 09/08/2018    BLOODU TRACE-INTACT 09/08/2018    SPECGRAV 1.010 09/08/2018    GLUCOSEU Negative 09/08/2018    GLUCOSEU NEGATIVE 01/10/2012     Cultures    Blood: E Coli  Urine:

## 2018-09-10 NOTE — PROGRESS NOTES
Patient resting in bed, prune juice provided per patient request. Daughter in law at bedside. Will continue to monitor.

## 2018-09-11 ENCOUNTER — CARE COORDINATION (OUTPATIENT)
Dept: CASE MANAGEMENT | Age: 83
End: 2018-09-11

## 2018-09-11 DIAGNOSIS — N39.0 URINARY TRACT INFECTION WITHOUT HEMATURIA, SITE UNSPECIFIED: Primary | ICD-10-CM

## 2018-09-11 LAB
BLOOD CULTURE, ROUTINE: ABNORMAL
EKG ATRIAL RATE: 104 BPM
EKG DIAGNOSIS: NORMAL
EKG P AXIS: 37 DEGREES
EKG P-R INTERVAL: 100 MS
EKG Q-T INTERVAL: 354 MS
EKG QRS DURATION: 88 MS
EKG QTC CALCULATION (BAZETT): 465 MS
EKG R AXIS: -9 DEGREES
EKG T AXIS: 32 DEGREES
EKG VENTRICULAR RATE: 104 BPM
ORGANISM: ABNORMAL
ORGANISM: ABNORMAL

## 2018-09-11 NOTE — CARE COORDINATION
[facility] Transitions Initial Follow Up Call    Call within 2 business days of discharge: Yes    Patient: Bhargav Casas Patient : 1931   MRN: [id number]  Reason for Admission: resp failure, sepsis, CAP, UTI, CKD3  Discharge Date: 9/10/18 RARS: Readmission Risk Score: 14     Spoke with: Teressagoran Astorga (patient)    Facility: [facility]    Non-face-to-face services provided:  Obtained and reviewed discharge summary and/or continuity of care documents  Education of patient/family/caregiver/guardian to support self-management-s/s to report; schedule PCP appt  Assessment and support for treatment adherence and medication management-[id number] completed    Care Transitions 24 Hour Call    Schedule Follow Up Appointment with PCP:  Declined  Do you have any ongoing symptoms?:  No  Do you have a copy of your discharge instructions?:  Yes  Do you have all of your prescriptions and are they filled?:  Yes  Have you been contacted by a [facility] Pharmacist?:  No  Have you scheduled your follow up appointment?:  No (Comment: daughter will call to schedule)  Were you discharged with any Home Care or Post Acute Services:  Yes  Post Acute Services: Other (Comment: private pay caregiver while family at work)  Patient DME:  Straight cane, Walker, Shower chair, Wheelchair  Do you have support at home?:  Child  Are you an active caregiver in your home?:  No  Care Transitions Interventions     Other Therapy Services:  Declined      Other Services:  Declined (Comment: Nursing HC)        Rock Szymanski reports she feels well. Denies cp, palpitations, nausea, vomiting, SOB. Occasional NP cough. Declines skilled nursing, PT. Has private pay caregiver while family is at work. Reviewed meds. Attempted to schedule PCP follow up but daughter will call.      Follow Up  Future Appointments  Date Time Provider Jocelynn Alberts   2018 9:50 AM Marcelo Summers MD [facility] Int None       Yessenia Peralta RN

## 2018-09-12 ENCOUNTER — OFFICE VISIT (OUTPATIENT)
Dept: INTERNAL MEDICINE CLINIC | Age: 83
End: 2018-09-12

## 2018-09-12 VITALS
SYSTOLIC BLOOD PRESSURE: 120 MMHG | HEIGHT: 62 IN | DIASTOLIC BLOOD PRESSURE: 64 MMHG | BODY MASS INDEX: 25.03 KG/M2 | RESPIRATION RATE: 14 BRPM | TEMPERATURE: 96.4 F | WEIGHT: 136 LBS | HEART RATE: 84 BPM

## 2018-09-12 DIAGNOSIS — Z09 HOSPITAL DISCHARGE FOLLOW-UP: Primary | ICD-10-CM

## 2018-09-12 PROCEDURE — 99213 OFFICE O/P EST LOW 20 MIN: CPT | Performed by: PHYSICIAN ASSISTANT

## 2018-09-12 PROCEDURE — 1111F DSCHRG MED/CURRENT MED MERGE: CPT | Performed by: PHYSICIAN ASSISTANT

## 2018-09-12 ASSESSMENT — ENCOUNTER SYMPTOMS
RHINORRHEA: 0
ABDOMINAL PAIN: 0
VOMITING: 0
SORE THROAT: 0
CONSTIPATION: 0
BLOOD IN STOOL: 0
EYE ITCHING: 0
EYE DISCHARGE: 0
DIARRHEA: 0
BACK PAIN: 0
CHEST TIGHTNESS: 0
ABDOMINAL DISTENTION: 0
SINUS PRESSURE: 0
COUGH: 1
WHEEZING: 0
NAUSEA: 0
SHORTNESS OF BREATH: 0
EYE PAIN: 0

## 2018-09-13 LAB — CULTURE, BLOOD 2: NORMAL

## 2018-09-14 ENCOUNTER — CARE COORDINATION (OUTPATIENT)
Dept: CASE MANAGEMENT | Age: 83
End: 2018-09-14

## 2018-09-14 LAB
BLOOD CULTURE, ROUTINE: NORMAL
CULTURE, BLOOD 2: NORMAL

## 2018-09-14 NOTE — CARE COORDINATION
Jose [facility] Transitions Follow Up Call    2018    Patient: Azeb Duganr  Patient : 1931   MRN: [id number]  Reason for Admission: UTI  Discharge Date: 9/10/18 RARS: Readmission Risk Score: 15       Spoke with: Korina Hanley (patient)    Care Transitions Subsequent and Final Call    Schedule Follow Up Appointment with PCP:  Completed  Subsequent and Final Calls  Do you have any ongoing symptoms?:  No  Have your medications changed?:  No  Do you have any questions related to your medications?:  No  Do you currently have any active services?:  No  Are you currently active with any services?:  Other  Do you have any needs or concerns that I can assist you with?:  No  Identified Barriers:  Impairment  Care Transitions Interventions     Other Services:  Declined (Comment: Nursing HC)   Other Interventions:          Patient completed TCM visit with no new orders. Reports she feels well today with no fever, chills, n/v. Denies needs. Thankful for call. Care transition outreach will continue.        Follow Up  Future Appointments  Date Time Provider Jocelynn Alberts   2018 9:50 AM MD Shannen Carreon Int None       Arelis Caldwell, MEHREEN

## 2018-09-18 ENCOUNTER — CARE COORDINATION (OUTPATIENT)
Dept: CASE MANAGEMENT | Age: 83
End: 2018-09-18

## 2018-09-18 NOTE — CARE COORDINATION
Jose [facility] Transitions Follow Up Call    2018    Patient: Leticia Albrecht  Patient : 1931   MRN: [id number]  Reason for Admission: resp failure, sepsis, CAP, UTI, CKD3  Discharge Date: 9/10/18 RARS: Readmission Risk Score: 14     Unable to reach patient by phone. Message left stating purpose of call with contact information requesting return call.        Follow Up  Future Appointments  Date Time Provider Jocelynn Alberts   2018 9:50 AM MD Mat Kyle None       Raymond Nurse, RN

## 2018-09-24 NOTE — CARE COORDINATION
Jose [facility] Transitions FINAL Call    2018    Patient: Beatrice Pizarro  Patient : 1931   MRN: [id number]  Reason for Admission: sepsis, resp failure 2/2 CAP, UTI, CKD3  Discharge Date: 9/10/18 RARS: Readmission Risk Score: 15       Spoke with: Walter Beavers (patient)    Care Transitions Subsequent and Final Call    Schedule Follow Up Appointment with PCP:  Completed  Subsequent and Final Calls  Do you have any ongoing symptoms?:  No  Have your medications changed?:  No  Do you have any questions related to your medications?:  No  Do you currently have any active services?:  No  Are you currently active with any services?:  Other  Do you have any needs or concerns that I can assist you with?:  No  Identified Barriers:  None  Care Transitions Interventions     Other Therapy Services:  Declined      Other Services:  Declined (Comment: Nursing HC)   Other Interventions:          Patient was very brief. States she is doing well. Reviewed s/s to call to PCP office immediately for same/next day appointment or recommendation. She verbalizes understanding. Care transition outreach complete.      Follow Up  Future Appointments  Date Time Provider Jocelynn Alberts   2018 9:50 AM MD Earnestine Arellano Int None       Mel Ramey, RN

## 2018-10-01 RX ORDER — ATORVASTATIN CALCIUM 20 MG/1
TABLET, FILM COATED ORAL
Qty: 30 TABLET | Refills: 2 | Status: SHIPPED | OUTPATIENT
Start: 2018-10-01 | End: 2019-01-24 | Stop reason: SDUPTHER

## 2018-10-08 RX ORDER — CARVEDILOL 6.25 MG/1
TABLET ORAL
Qty: 180 TABLET | Refills: 0 | Status: SHIPPED | OUTPATIENT
Start: 2018-10-08 | End: 2019-01-04 | Stop reason: SDUPTHER

## 2018-11-13 RX ORDER — FUROSEMIDE 40 MG/1
TABLET ORAL
Qty: 90 TABLET | Refills: 0 | Status: SHIPPED | OUTPATIENT
Start: 2018-11-13 | End: 2019-02-01 | Stop reason: SDUPTHER

## 2018-12-13 ENCOUNTER — OFFICE VISIT (OUTPATIENT)
Dept: INTERNAL MEDICINE CLINIC | Age: 83
End: 2018-12-13

## 2018-12-13 VITALS
HEART RATE: 70 BPM | RESPIRATION RATE: 14 BRPM | WEIGHT: 134 LBS | DIASTOLIC BLOOD PRESSURE: 80 MMHG | SYSTOLIC BLOOD PRESSURE: 120 MMHG | BODY MASS INDEX: 24.66 KG/M2 | HEIGHT: 62 IN

## 2018-12-13 DIAGNOSIS — Z00.00 ROUTINE GENERAL MEDICAL EXAMINATION AT A HEALTH CARE FACILITY: ICD-10-CM

## 2018-12-13 DIAGNOSIS — F02.80 UNCOMPLICATED LATE ONSET ALZHEIMER'S DEMENTIA (HCC): ICD-10-CM

## 2018-12-13 DIAGNOSIS — Z00.00 ENCOUNTER FOR INITIAL PREVENTIVE PHYSICAL EXAMINATION COVERED BY MEDICARE: Primary | ICD-10-CM

## 2018-12-13 DIAGNOSIS — G30.1 UNCOMPLICATED LATE ONSET ALZHEIMER'S DEMENTIA (HCC): ICD-10-CM

## 2018-12-13 DIAGNOSIS — I25.10 CORONARY ARTERY DISEASE INVOLVING NATIVE CORONARY ARTERY OF NATIVE HEART WITHOUT ANGINA PECTORIS: ICD-10-CM

## 2018-12-13 LAB
A/G RATIO: 1.3 (ref 1.1–2.2)
ALBUMIN SERPL-MCNC: 4.3 G/DL (ref 3.4–5)
ALP BLD-CCNC: 140 U/L (ref 40–129)
ALT SERPL-CCNC: 40 U/L (ref 10–40)
ANION GAP SERPL CALCULATED.3IONS-SCNC: 17 MMOL/L (ref 3–16)
AST SERPL-CCNC: 43 U/L (ref 15–37)
BILIRUB SERPL-MCNC: 0.4 MG/DL (ref 0–1)
BUN BLDV-MCNC: 33 MG/DL (ref 7–20)
CALCIUM SERPL-MCNC: 9.5 MG/DL (ref 8.3–10.6)
CHLORIDE BLD-SCNC: 101 MMOL/L (ref 99–110)
CHOLESTEROL, TOTAL: 162 MG/DL (ref 0–199)
CO2: 24 MMOL/L (ref 21–32)
CREAT SERPL-MCNC: 1.4 MG/DL (ref 0.6–1.2)
GFR AFRICAN AMERICAN: 43
GFR NON-AFRICAN AMERICAN: 36
GLOBULIN: 3.4 G/DL
GLUCOSE BLD-MCNC: 90 MG/DL (ref 70–99)
HDLC SERPL-MCNC: 45 MG/DL (ref 40–60)
LDL CHOLESTEROL CALCULATED: 86 MG/DL
POTASSIUM SERPL-SCNC: 4.6 MMOL/L (ref 3.5–5.1)
SODIUM BLD-SCNC: 142 MMOL/L (ref 136–145)
TOTAL PROTEIN: 7.7 G/DL (ref 6.4–8.2)
TRIGL SERPL-MCNC: 154 MG/DL (ref 0–150)
URIC ACID, SERUM: 10.7 MG/DL (ref 2.6–6)
VLDLC SERPL CALC-MCNC: 31 MG/DL

## 2018-12-13 PROCEDURE — G0439 PPPS, SUBSEQ VISIT: HCPCS | Performed by: INTERNAL MEDICINE

## 2018-12-13 ASSESSMENT — ANXIETY QUESTIONNAIRES: GAD7 TOTAL SCORE: 0

## 2018-12-13 ASSESSMENT — PATIENT HEALTH QUESTIONNAIRE - PHQ9
SUM OF ALL RESPONSES TO PHQ QUESTIONS 1-9: 0
SUM OF ALL RESPONSES TO PHQ QUESTIONS 1-9: 0

## 2018-12-13 ASSESSMENT — LIFESTYLE VARIABLES: HOW OFTEN DO YOU HAVE A DRINK CONTAINING ALCOHOL: 0

## 2018-12-13 NOTE — PATIENT INSTRUCTIONS
Personalized Preventive Plan for Kumar Pizarro - 12/13/2018  Medicare offers a range of preventive health benefits. Some of the tests and screenings are paid in full while other may be subject to a deductible, co-insurance, and/or copay. Some of these benefits include a comprehensive review of your medical history including lifestyle, illnesses that may run in your family, and various assessments and screenings as appropriate. After reviewing your medical record and screening and assessments performed today your provider may have ordered immunizations, labs, imaging, and/or referrals for you. A list of these orders (if applicable) as well as your Preventive Care list are included within your After Visit Summary for your review. Other Preventive Recommendations:    · A preventive eye exam performed by an eye specialist is recommended every 1-2 years to screen for glaucoma; cataracts, macular degeneration, and other eye disorders. · A preventive dental visit is recommended every 6 months. · Try to get at least 150 minutes of exercise per week or 10,000 steps per day on a pedometer . · Order or download the FREE \"Exercise & Physical Activity: Your Everyday Guide\" from [employer]. Call 6-979.491.9145 or search [employer] online. · You need 7243-4609 mg of calcium and 5231-9297 IU of vitamin D per day. It is possible to meet your calcium requirement with diet alone, but a vitamin D supplement is usually necessary to meet this goal.  · When exposed to the sun, use a sunscreen that protects against both UVA and UVB radiation with an SPF of 30 or greater. Reapply every 2 to 3 hours or after sweating, drying off with a towel, or swimming. · Always wear a seat belt when traveling in a car. Always wear a helmet when riding a bicycle or motorcycle.     Keep Your Memory Michelle Jump       Many factors can affect your ability to remembera hectic lifestyle, aging, stress, S. A.F.E. (Smoke Alarms for Every) [facility], senior citizens are one of the two highest risk groups for death and serious injuries due to residential fires. When cooking, wear short-sleeved items, never a bulky long-sleeved robe. The [facility]'s Safety Checklist for Older Consumers emphasizes the importance of checking basements, garages, workshops and storage areas for fire hazards, such as volatile liquids, piles of old rags or clothing and overloaded circuits. Never smoke in bed or when lying down on a couch or recliner chair. Small portable electric or kerosene heaters are responsible for many home fires and should be used cautiously if at all. If you do use one, be sure to keep them away from flammable materials. In case of fire, make sure you have a pre-established emergency exit plan. Have a professional check your fireplace and other fuel-burning appliances yearly. [employer]   Baby boomers entering the panchal years will continue to see the development of new products to help older adults live safely and independently in spite of age-related changes. Making Life More Livable  , by Mireya Richey, lists over 1,000 products for \"living well in the mature years,\" such as bathing and mobility aids, household security devices, ergonomically designed knives and peelers, and faucet valves and knobs for temperature control. Medical supply stores and organizations are good sources of information about products that improve your quality of life and insure your safety.      Last Reviewed: November 2009 Michelle Lorenzana MD   Updated: 3/7/2011     ·

## 2018-12-26 ENCOUNTER — HOSPITAL ENCOUNTER (OUTPATIENT)
Age: 83
Discharge: HOME | End: 2018-12-26
Payer: MEDICARE

## 2018-12-26 LAB
BASOPHILS ABSOLUTE: 0.2 K/UL (ref 0–0.2)
BASOPHILS RELATIVE PERCENT: 2.8 %
EOSINOPHILS ABSOLUTE: 0.3 K/UL (ref 0–0.6)
EOSINOPHILS RELATIVE PERCENT: 3.3 %
HCT VFR BLD CALC: 35.5 % (ref 36–48)
HEMOGLOBIN: 11.5 G/DL (ref 12–16)
LYMPHOCYTES ABSOLUTE: 1.9 K/UL (ref 1–5.1)
LYMPHOCYTES RELATIVE PERCENT: 22.5 %
MCH RBC QN AUTO: 28.9 PG (ref 26–34)
MCHC RBC AUTO-ENTMCNC: 32.5 G/DL (ref 31–36)
MCV RBC AUTO: 89 FL (ref 80–100)
MONOCYTES ABSOLUTE: 0.9 K/UL (ref 0–1.3)
MONOCYTES RELATIVE PERCENT: 10.9 %
NEUTROPHILS ABSOLUTE: 5 K/UL (ref 1.7–7.7)
NEUTROPHILS RELATIVE PERCENT: 60.5 %
PDW BLD-RTO: 15.3 % (ref 12.4–15.4)
PLATELET # BLD: 271 K/UL (ref 135–450)
PMV BLD AUTO: 7.6 FL (ref 5–10.5)
RBC # BLD: 3.99 M/UL (ref 4–5.2)
WBC # BLD: 8.3 K/UL (ref 4–11)

## 2018-12-26 PROCEDURE — 85025 COMPLETE CBC W/AUTO DIFF WBC: CPT

## 2018-12-26 PROCEDURE — 36415 COLL VENOUS BLD VENIPUNCTURE: CPT

## 2019-01-04 RX ORDER — CARVEDILOL 6.25 MG/1
6.25 TABLET ORAL 2 TIMES DAILY WITH MEALS
Qty: 180 TABLET | Refills: 0 | Status: SHIPPED | OUTPATIENT
Start: 2019-01-04 | End: 2019-04-09 | Stop reason: SDUPTHER

## 2019-01-07 ENCOUNTER — OFFICE VISIT (OUTPATIENT)
Dept: INTERNAL MEDICINE CLINIC | Age: 84
End: 2019-01-07

## 2019-01-07 VITALS
HEIGHT: 62 IN | BODY MASS INDEX: 23.92 KG/M2 | DIASTOLIC BLOOD PRESSURE: 80 MMHG | WEIGHT: 130 LBS | SYSTOLIC BLOOD PRESSURE: 130 MMHG | RESPIRATION RATE: 14 BRPM | HEART RATE: 70 BPM

## 2019-01-07 DIAGNOSIS — R35.0 URINARY FREQUENCY: Primary | ICD-10-CM

## 2019-01-07 LAB
BILIRUBIN, POC: NORMAL
BLOOD URINE, POC: NORMAL
CLARITY, POC: NORMAL
COLOR, POC: NORMAL
GLUCOSE URINE, POC: NORMAL
KETONES, POC: NORMAL
LEUKOCYTE EST, POC: NORMAL
NITRITE, POC: NORMAL
PH, POC: NORMAL
PROTEIN, POC: NORMAL
SPECIFIC GRAVITY, POC: NORMAL
UROBILINOGEN, POC: NORMAL

## 2019-01-07 PROCEDURE — 81002 URINALYSIS NONAUTO W/O SCOPE: CPT | Performed by: INTERNAL MEDICINE

## 2019-01-07 PROCEDURE — 99212 OFFICE O/P EST SF 10 MIN: CPT | Performed by: INTERNAL MEDICINE

## 2019-01-10 ENCOUNTER — PATIENT MESSAGE (OUTPATIENT)
Dept: INTERNAL MEDICINE CLINIC | Age: 84
End: 2019-01-10

## 2019-01-11 RX ORDER — SULFAMETHOXAZOLE AND TRIMETHOPRIM 800; 160 MG/1; MG/1
1 TABLET ORAL 2 TIMES DAILY
Qty: 10 TABLET | Refills: 0 | Status: SHIPPED | OUTPATIENT
Start: 2019-01-11 | End: 2019-01-16

## 2019-01-14 ENCOUNTER — TELEPHONE (OUTPATIENT)
Dept: INTERNAL MEDICINE CLINIC | Age: 84
End: 2019-01-14

## 2019-01-17 ENCOUNTER — HOSPITAL ENCOUNTER (INPATIENT)
Age: 84
LOS: 2 days | Discharge: HOME | DRG: 683 | End: 2019-01-19
Attending: EMERGENCY MEDICINE | Admitting: INTERNAL MEDICINE
Payer: MEDICARE

## 2019-01-17 ENCOUNTER — APPOINTMENT (OUTPATIENT)
Dept: GENERAL RADIOLOGY | Age: 84
DRG: 683 | End: 2019-01-17
Payer: MEDICARE

## 2019-01-17 DIAGNOSIS — N17.9 AKI (ACUTE KIDNEY INJURY) (HCC): Primary | ICD-10-CM

## 2019-01-17 DIAGNOSIS — R79.89 ELEVATED BRAIN NATRIURETIC PEPTIDE (BNP) LEVEL: ICD-10-CM

## 2019-01-17 LAB
A/G RATIO: 1 (ref 1.1–2.2)
ALBUMIN SERPL-MCNC: 3.9 G/DL (ref 3.4–5)
ALP BLD-CCNC: 111 U/L (ref 40–129)
ALT SERPL-CCNC: 25 U/L (ref 10–40)
ANION GAP SERPL CALCULATED.3IONS-SCNC: 12 MMOL/L (ref 3–16)
AST SERPL-CCNC: 36 U/L (ref 15–37)
BACTERIA: ABNORMAL /HPF
BASOPHILS ABSOLUTE: 0.2 K/UL (ref 0–0.2)
BASOPHILS RELATIVE PERCENT: 2.6 %
BILIRUB SERPL-MCNC: <0.2 MG/DL (ref 0–1)
BILIRUBIN URINE: NEGATIVE
BLOOD, URINE: NEGATIVE
BUN BLDV-MCNC: 41 MG/DL (ref 7–20)
CALCIUM SERPL-MCNC: 9.7 MG/DL (ref 8.3–10.6)
CHLORIDE BLD-SCNC: 96 MMOL/L (ref 99–110)
CLARITY: CLEAR
CO2: 25 MMOL/L (ref 21–32)
COLOR: YELLOW
CREAT SERPL-MCNC: 2.1 MG/DL (ref 0.6–1.2)
EKG ATRIAL RATE: 84 BPM
EKG DIAGNOSIS: NORMAL
EKG P AXIS: 79 DEGREES
EKG P-R INTERVAL: 124 MS
EKG Q-T INTERVAL: 396 MS
EKG QRS DURATION: 82 MS
EKG QTC CALCULATION (BAZETT): 467 MS
EKG R AXIS: -15 DEGREES
EKG T AXIS: 44 DEGREES
EKG VENTRICULAR RATE: 84 BPM
EOSINOPHILS ABSOLUTE: 0.1 K/UL (ref 0–0.6)
EOSINOPHILS RELATIVE PERCENT: 2 %
EPITHELIAL CELLS, UA: ABNORMAL /HPF
GFR AFRICAN AMERICAN: 27
GFR NON-AFRICAN AMERICAN: 22
GLOBULIN: 4 G/DL
GLUCOSE BLD-MCNC: 77 MG/DL (ref 70–99)
GLUCOSE URINE: NEGATIVE MG/DL
HCT VFR BLD CALC: 34.5 % (ref 36–48)
HEMOGLOBIN: 11.2 G/DL (ref 12–16)
KETONES, URINE: NEGATIVE MG/DL
LACTIC ACID: 1.8 MMOL/L (ref 0.4–2)
LEUKOCYTE ESTERASE, URINE: ABNORMAL
LYMPHOCYTES ABSOLUTE: 1.2 K/UL (ref 1–5.1)
LYMPHOCYTES RELATIVE PERCENT: 17.5 %
MCH RBC QN AUTO: 29 PG (ref 26–34)
MCHC RBC AUTO-ENTMCNC: 32.4 G/DL (ref 31–36)
MCV RBC AUTO: 89.4 FL (ref 80–100)
MICROSCOPIC EXAMINATION: YES
MONOCYTES ABSOLUTE: 1 K/UL (ref 0–1.3)
MONOCYTES RELATIVE PERCENT: 14.5 %
NEUTROPHILS ABSOLUTE: 4.4 K/UL (ref 1.7–7.7)
NEUTROPHILS RELATIVE PERCENT: 63.4 %
NITRITE, URINE: NEGATIVE
PDW BLD-RTO: 14.6 % (ref 12.4–15.4)
PH UA: 6
PLATELET # BLD: 258 K/UL (ref 135–450)
PMV BLD AUTO: 7.7 FL (ref 5–10.5)
POTASSIUM SERPL-SCNC: 4.8 MMOL/L (ref 3.5–5.1)
PRO-BNP: 2065 PG/ML (ref 0–449)
PROTEIN UA: NEGATIVE MG/DL
RBC # BLD: 3.86 M/UL (ref 4–5.2)
RBC UA: ABNORMAL /HPF (ref 0–2)
SODIUM BLD-SCNC: 133 MMOL/L (ref 136–145)
SPECIFIC GRAVITY UA: 1.01
TOTAL PROTEIN: 7.9 G/DL (ref 6.4–8.2)
TROPONIN: <0.01 NG/ML
TROPONIN: <0.01 NG/ML
URINE TYPE: ABNORMAL
UROBILINOGEN, URINE: 0.2 E.U./DL
WBC # BLD: 6.9 K/UL (ref 4–11)
WBC UA: ABNORMAL /HPF (ref 0–5)

## 2019-01-17 PROCEDURE — 6360000002 HC RX W HCPCS: Performed by: PHYSICIAN ASSISTANT

## 2019-01-17 PROCEDURE — 6370000000 HC RX 637 (ALT 250 FOR IP): Performed by: EMERGENCY MEDICINE

## 2019-01-17 PROCEDURE — 6370000000 HC RX 637 (ALT 250 FOR IP): Performed by: PHYSICIAN ASSISTANT

## 2019-01-17 PROCEDURE — 84300 ASSAY OF URINE SODIUM: CPT

## 2019-01-17 PROCEDURE — 80053 COMPREHEN METABOLIC PANEL: CPT

## 2019-01-17 PROCEDURE — 2580000003 HC RX 258: Performed by: PHYSICIAN ASSISTANT

## 2019-01-17 PROCEDURE — G0378 HOSPITAL OBSERVATION PER HR: HCPCS

## 2019-01-17 PROCEDURE — 99285 EMERGENCY DEPT VISIT HI MDM: CPT

## 2019-01-17 PROCEDURE — 83935 ASSAY OF URINE OSMOLALITY: CPT

## 2019-01-17 PROCEDURE — 99222 1ST HOSP IP/OBS MODERATE 55: CPT | Performed by: PHYSICIAN ASSISTANT

## 2019-01-17 PROCEDURE — 96372 THER/PROPH/DIAG INJ SC/IM: CPT

## 2019-01-17 PROCEDURE — 84484 ASSAY OF TROPONIN QUANT: CPT

## 2019-01-17 PROCEDURE — 85025 COMPLETE CBC W/AUTO DIFF WBC: CPT

## 2019-01-17 PROCEDURE — 36415 COLL VENOUS BLD VENIPUNCTURE: CPT

## 2019-01-17 PROCEDURE — 71045 X-RAY EXAM CHEST 1 VIEW: CPT

## 2019-01-17 PROCEDURE — 87086 URINE CULTURE/COLONY COUNT: CPT

## 2019-01-17 PROCEDURE — 93010 ELECTROCARDIOGRAM REPORT: CPT | Performed by: INTERNAL MEDICINE

## 2019-01-17 PROCEDURE — 81001 URINALYSIS AUTO W/SCOPE: CPT

## 2019-01-17 PROCEDURE — 83605 ASSAY OF LACTIC ACID: CPT

## 2019-01-17 PROCEDURE — 1200000000 HC SEMI PRIVATE

## 2019-01-17 PROCEDURE — 83880 ASSAY OF NATRIURETIC PEPTIDE: CPT

## 2019-01-17 PROCEDURE — 82570 ASSAY OF URINE CREATININE: CPT

## 2019-01-17 PROCEDURE — 93005 ELECTROCARDIOGRAM TRACING: CPT | Performed by: EMERGENCY MEDICINE

## 2019-01-17 RX ORDER — ASPIRIN 325 MG
325 TABLET ORAL ONCE
Status: COMPLETED | OUTPATIENT
Start: 2019-01-17 | End: 2019-01-17

## 2019-01-17 RX ORDER — ACETAMINOPHEN 325 MG/1
650 TABLET ORAL EVERY 4 HOURS PRN
Status: DISCONTINUED | OUTPATIENT
Start: 2019-01-17 | End: 2019-01-19 | Stop reason: HOSPADM

## 2019-01-17 RX ORDER — SODIUM CHLORIDE 9 MG/ML
INJECTION, SOLUTION INTRAVENOUS CONTINUOUS
Status: DISPENSED | OUTPATIENT
Start: 2019-01-17 | End: 2019-01-18

## 2019-01-17 RX ORDER — 0.9 % SODIUM CHLORIDE 0.9 %
250 INTRAVENOUS SOLUTION INTRAVENOUS ONCE
Status: COMPLETED | OUTPATIENT
Start: 2019-01-17 | End: 2019-01-17

## 2019-01-17 RX ORDER — ATORVASTATIN CALCIUM 10 MG/1
20 TABLET, FILM COATED ORAL DAILY
Status: DISCONTINUED | OUTPATIENT
Start: 2019-01-17 | End: 2019-01-17

## 2019-01-17 RX ORDER — ONDANSETRON 2 MG/ML
4 INJECTION INTRAMUSCULAR; INTRAVENOUS EVERY 6 HOURS PRN
Status: DISCONTINUED | OUTPATIENT
Start: 2019-01-17 | End: 2019-01-19 | Stop reason: HOSPADM

## 2019-01-17 RX ORDER — CARVEDILOL 6.25 MG/1
6.25 TABLET ORAL 2 TIMES DAILY WITH MEALS
Status: DISCONTINUED | OUTPATIENT
Start: 2019-01-18 | End: 2019-01-19 | Stop reason: HOSPADM

## 2019-01-17 RX ORDER — PANTOPRAZOLE SODIUM 40 MG/1
40 TABLET, DELAYED RELEASE ORAL DAILY
Status: DISCONTINUED | OUTPATIENT
Start: 2019-01-17 | End: 2019-01-19 | Stop reason: HOSPADM

## 2019-01-17 RX ORDER — ATORVASTATIN CALCIUM 10 MG/1
20 TABLET, FILM COATED ORAL NIGHTLY
Status: DISCONTINUED | OUTPATIENT
Start: 2019-01-17 | End: 2019-01-19 | Stop reason: HOSPADM

## 2019-01-17 RX ORDER — ASPIRIN 81 MG/1
81 TABLET ORAL DAILY
Status: DISCONTINUED | OUTPATIENT
Start: 2019-01-18 | End: 2019-01-19 | Stop reason: HOSPADM

## 2019-01-17 RX ORDER — NITROFURANTOIN 25; 75 MG/1; MG/1
100 CAPSULE ORAL ONCE
Status: COMPLETED | OUTPATIENT
Start: 2019-01-17 | End: 2019-01-17

## 2019-01-17 RX ORDER — SODIUM CHLORIDE 0.9 % (FLUSH) 0.9 %
10 SYRINGE (ML) INJECTION EVERY 12 HOURS SCHEDULED
Status: DISCONTINUED | OUTPATIENT
Start: 2019-01-17 | End: 2019-01-19 | Stop reason: HOSPADM

## 2019-01-17 RX ORDER — DOCUSATE SODIUM 100 MG/1
100 CAPSULE, LIQUID FILLED ORAL 2 TIMES DAILY
Status: DISCONTINUED | OUTPATIENT
Start: 2019-01-17 | End: 2019-01-19 | Stop reason: HOSPADM

## 2019-01-17 RX ORDER — HEPARIN SODIUM 5000 [USP'U]/ML
5000 INJECTION, SOLUTION INTRAVENOUS; SUBCUTANEOUS EVERY 8 HOURS SCHEDULED
Status: DISCONTINUED | OUTPATIENT
Start: 2019-01-17 | End: 2019-01-19 | Stop reason: HOSPADM

## 2019-01-17 RX ORDER — SODIUM CHLORIDE 0.9 % (FLUSH) 0.9 %
10 SYRINGE (ML) INJECTION PRN
Status: DISCONTINUED | OUTPATIENT
Start: 2019-01-17 | End: 2019-01-19 | Stop reason: HOSPADM

## 2019-01-17 RX ORDER — CARVEDILOL 6.25 MG/1
6.25 TABLET ORAL 2 TIMES DAILY WITH MEALS
Status: DISCONTINUED | OUTPATIENT
Start: 2019-01-17 | End: 2019-01-17

## 2019-01-17 RX ADMIN — ATORVASTATIN CALCIUM 20 MG: 10 TABLET, FILM COATED ORAL at 20:53

## 2019-01-17 RX ADMIN — NITROFURANTOIN (MONOHYDRATE/MACROCRYSTALS) 100 MG: 75; 25 CAPSULE ORAL at 15:01

## 2019-01-17 RX ADMIN — HEPARIN SODIUM 5000 UNITS: 5000 INJECTION INTRAVENOUS; SUBCUTANEOUS at 21:02

## 2019-01-17 RX ADMIN — DOCUSATE SODIUM 100 MG: 100 CAPSULE, LIQUID FILLED ORAL at 20:53

## 2019-01-17 RX ADMIN — PANTOPRAZOLE SODIUM 40 MG: 40 TABLET, DELAYED RELEASE ORAL at 17:52

## 2019-01-17 RX ADMIN — ASPIRIN 325 MG: 325 TABLET ORAL at 12:57

## 2019-01-17 RX ADMIN — Medication 10 ML: at 20:53

## 2019-01-17 RX ADMIN — SODIUM CHLORIDE: 9 INJECTION, SOLUTION INTRAVENOUS at 17:52

## 2019-01-17 RX ADMIN — SODIUM CHLORIDE 250 ML: 9 INJECTION, SOLUTION INTRAVENOUS at 17:52

## 2019-01-17 ASSESSMENT — ENCOUNTER SYMPTOMS
ABDOMINAL PAIN: 1
TROUBLE SWALLOWING: 0
VOMITING: 1
SORE THROAT: 0
WHEEZING: 0
RHINORRHEA: 0
NAUSEA: 1
DIARRHEA: 0
CHEST TIGHTNESS: 0
STRIDOR: 0
COUGH: 0
SHORTNESS OF BREATH: 0

## 2019-01-17 ASSESSMENT — PAIN SCALES - GENERAL
PAINLEVEL_OUTOF10: 3
PAINLEVEL_OUTOF10: 1

## 2019-01-17 ASSESSMENT — PAIN - FUNCTIONAL ASSESSMENT: PAIN_FUNCTIONAL_ASSESSMENT: ACTIVITIES ARE NOT PREVENTED

## 2019-01-17 ASSESSMENT — PAIN DESCRIPTION - ORIENTATION: ORIENTATION: LOWER;MID

## 2019-01-17 ASSESSMENT — PAIN DESCRIPTION - PAIN TYPE: TYPE: ACUTE PAIN

## 2019-01-17 ASSESSMENT — PAIN DESCRIPTION - LOCATION: LOCATION: ABDOMEN

## 2019-01-18 LAB
A/G RATIO: 0.9 (ref 1.1–2.2)
ALBUMIN SERPL-MCNC: 3.3 G/DL (ref 3.4–5)
ALP BLD-CCNC: 94 U/L (ref 40–129)
ALT SERPL-CCNC: 23 U/L (ref 10–40)
ANION GAP SERPL CALCULATED.3IONS-SCNC: 12 MMOL/L (ref 3–16)
AST SERPL-CCNC: 35 U/L (ref 15–37)
BASOPHILS ABSOLUTE: 0.2 K/UL (ref 0–0.2)
BASOPHILS RELATIVE PERCENT: 2.9 %
BILIRUB SERPL-MCNC: 0.3 MG/DL (ref 0–1)
BUN BLDV-MCNC: 38 MG/DL (ref 7–20)
CALCIUM SERPL-MCNC: 8.8 MG/DL (ref 8.3–10.6)
CHLORIDE BLD-SCNC: 104 MMOL/L (ref 99–110)
CO2: 22 MMOL/L (ref 21–32)
CREAT SERPL-MCNC: 2 MG/DL (ref 0.6–1.2)
CREATININE URINE: 35.6 MG/DL (ref 28–259)
EOSINOPHILS ABSOLUTE: 0.2 K/UL (ref 0–0.6)
EOSINOPHILS RELATIVE PERCENT: 3.6 %
GFR AFRICAN AMERICAN: 29
GFR NON-AFRICAN AMERICAN: 24
GLOBULIN: 3.5 G/DL
GLUCOSE BLD-MCNC: 81 MG/DL (ref 70–99)
HCT VFR BLD CALC: 30.9 % (ref 36–48)
HEMOGLOBIN: 10.1 G/DL (ref 12–16)
LYMPHOCYTES ABSOLUTE: 1.3 K/UL (ref 1–5.1)
LYMPHOCYTES RELATIVE PERCENT: 24.3 %
MCH RBC QN AUTO: 29.3 PG (ref 26–34)
MCHC RBC AUTO-ENTMCNC: 32.8 G/DL (ref 31–36)
MCV RBC AUTO: 89.3 FL (ref 80–100)
MONOCYTES ABSOLUTE: 0.8 K/UL (ref 0–1.3)
MONOCYTES RELATIVE PERCENT: 15 %
NEUTROPHILS ABSOLUTE: 2.8 K/UL (ref 1.7–7.7)
NEUTROPHILS RELATIVE PERCENT: 54.2 %
OSMOLALITY URINE: 362 MOSM/KG (ref 390–1070)
PDW BLD-RTO: 14.7 % (ref 12.4–15.4)
PLATELET # BLD: 227 K/UL (ref 135–450)
PMV BLD AUTO: 7.6 FL (ref 5–10.5)
POTASSIUM REFLEX MAGNESIUM: 4.3 MMOL/L (ref 3.5–5.1)
RBC # BLD: 3.46 M/UL (ref 4–5.2)
SODIUM BLD-SCNC: 138 MMOL/L (ref 136–145)
SODIUM URINE: 87 MMOL/L
TOTAL PROTEIN: 6.8 G/DL (ref 6.4–8.2)
URINE CULTURE, ROUTINE: NORMAL
WBC # BLD: 5.2 K/UL (ref 4–11)

## 2019-01-18 PROCEDURE — 97116 GAIT TRAINING THERAPY: CPT

## 2019-01-18 PROCEDURE — 85025 COMPLETE CBC W/AUTO DIFF WBC: CPT

## 2019-01-18 PROCEDURE — 96372 THER/PROPH/DIAG INJ SC/IM: CPT

## 2019-01-18 PROCEDURE — 6360000002 HC RX W HCPCS: Performed by: PHYSICIAN ASSISTANT

## 2019-01-18 PROCEDURE — 6370000000 HC RX 637 (ALT 250 FOR IP): Performed by: PHYSICIAN ASSISTANT

## 2019-01-18 PROCEDURE — 97161 PT EVAL LOW COMPLEX 20 MIN: CPT

## 2019-01-18 PROCEDURE — 2580000003 HC RX 258: Performed by: INTERNAL MEDICINE

## 2019-01-18 PROCEDURE — G0378 HOSPITAL OBSERVATION PER HR: HCPCS

## 2019-01-18 PROCEDURE — 36415 COLL VENOUS BLD VENIPUNCTURE: CPT

## 2019-01-18 PROCEDURE — 1200000000 HC SEMI PRIVATE

## 2019-01-18 PROCEDURE — 97530 THERAPEUTIC ACTIVITIES: CPT

## 2019-01-18 PROCEDURE — 87205 SMEAR GRAM STAIN: CPT

## 2019-01-18 PROCEDURE — 80053 COMPREHEN METABOLIC PANEL: CPT

## 2019-01-18 PROCEDURE — 2580000003 HC RX 258: Performed by: PHYSICIAN ASSISTANT

## 2019-01-18 PROCEDURE — 97535 SELF CARE MNGMENT TRAINING: CPT

## 2019-01-18 PROCEDURE — 99232 SBSQ HOSP IP/OBS MODERATE 35: CPT | Performed by: INTERNAL MEDICINE

## 2019-01-18 PROCEDURE — 97165 OT EVAL LOW COMPLEX 30 MIN: CPT

## 2019-01-18 RX ORDER — SODIUM CHLORIDE 9 MG/ML
INJECTION, SOLUTION INTRAVENOUS CONTINUOUS
Status: DISPENSED | OUTPATIENT
Start: 2019-01-18 | End: 2019-01-18

## 2019-01-18 RX ADMIN — HEPARIN SODIUM 5000 UNITS: 5000 INJECTION INTRAVENOUS; SUBCUTANEOUS at 13:30

## 2019-01-18 RX ADMIN — CARVEDILOL 6.25 MG: 6.25 TABLET, FILM COATED ORAL at 16:27

## 2019-01-18 RX ADMIN — Medication 10 ML: at 08:26

## 2019-01-18 RX ADMIN — HEPARIN SODIUM 5000 UNITS: 5000 INJECTION INTRAVENOUS; SUBCUTANEOUS at 05:26

## 2019-01-18 RX ADMIN — Medication 10 ML: at 10:31

## 2019-01-18 RX ADMIN — ASPIRIN 81 MG: 81 TABLET, COATED ORAL at 08:26

## 2019-01-18 RX ADMIN — DOCUSATE SODIUM 100 MG: 100 CAPSULE, LIQUID FILLED ORAL at 08:26

## 2019-01-18 RX ADMIN — DOCUSATE SODIUM 100 MG: 100 CAPSULE, LIQUID FILLED ORAL at 21:21

## 2019-01-18 RX ADMIN — CARVEDILOL 6.25 MG: 6.25 TABLET, FILM COATED ORAL at 08:26

## 2019-01-18 RX ADMIN — ATORVASTATIN CALCIUM 20 MG: 10 TABLET, FILM COATED ORAL at 21:21

## 2019-01-18 RX ADMIN — SODIUM CHLORIDE: 9 INJECTION, SOLUTION INTRAVENOUS at 10:30

## 2019-01-18 RX ADMIN — HEPARIN SODIUM 5000 UNITS: 5000 INJECTION INTRAVENOUS; SUBCUTANEOUS at 21:21

## 2019-01-18 RX ADMIN — PANTOPRAZOLE SODIUM 40 MG: 40 TABLET, DELAYED RELEASE ORAL at 08:26

## 2019-01-18 RX ADMIN — SODIUM CHLORIDE: 9 INJECTION, SOLUTION INTRAVENOUS at 21:48

## 2019-01-18 ASSESSMENT — PAIN SCALES - GENERAL: PAINLEVEL_OUTOF10: 2

## 2019-01-19 VITALS
WEIGHT: 136.1 LBS | OXYGEN SATURATION: 95 % | HEART RATE: 77 BPM | TEMPERATURE: 97.3 F | HEIGHT: 62 IN | SYSTOLIC BLOOD PRESSURE: 121 MMHG | BODY MASS INDEX: 25.04 KG/M2 | RESPIRATION RATE: 16 BRPM | DIASTOLIC BLOOD PRESSURE: 61 MMHG

## 2019-01-19 LAB
ANION GAP SERPL CALCULATED.3IONS-SCNC: 10 MMOL/L (ref 3–16)
BUN BLDV-MCNC: 28 MG/DL (ref 7–20)
CALCIUM SERPL-MCNC: 9 MG/DL (ref 8.3–10.6)
CHLORIDE BLD-SCNC: 106 MMOL/L (ref 99–110)
CO2: 18 MMOL/L (ref 21–32)
CREAT SERPL-MCNC: 1.6 MG/DL (ref 0.6–1.2)
EOSINOPHIL,URINE: NORMAL
GFR AFRICAN AMERICAN: 37
GFR NON-AFRICAN AMERICAN: 30
GLUCOSE BLD-MCNC: 112 MG/DL (ref 70–99)
POTASSIUM SERPL-SCNC: 4.5 MMOL/L (ref 3.5–5.1)
SODIUM BLD-SCNC: 134 MMOL/L (ref 136–145)

## 2019-01-19 PROCEDURE — G0378 HOSPITAL OBSERVATION PER HR: HCPCS

## 2019-01-19 PROCEDURE — 6360000002 HC RX W HCPCS: Performed by: PHYSICIAN ASSISTANT

## 2019-01-19 PROCEDURE — 6370000000 HC RX 637 (ALT 250 FOR IP): Performed by: PHYSICIAN ASSISTANT

## 2019-01-19 PROCEDURE — 80048 BASIC METABOLIC PNL TOTAL CA: CPT

## 2019-01-19 PROCEDURE — 36415 COLL VENOUS BLD VENIPUNCTURE: CPT

## 2019-01-19 PROCEDURE — 96372 THER/PROPH/DIAG INJ SC/IM: CPT

## 2019-01-19 PROCEDURE — 99238 HOSP IP/OBS DSCHRG MGMT 30/<: CPT | Performed by: INTERNAL MEDICINE

## 2019-01-19 RX ADMIN — ASPIRIN 81 MG: 81 TABLET, COATED ORAL at 09:07

## 2019-01-19 RX ADMIN — CARVEDILOL 6.25 MG: 6.25 TABLET, FILM COATED ORAL at 09:07

## 2019-01-19 RX ADMIN — PANTOPRAZOLE SODIUM 40 MG: 40 TABLET, DELAYED RELEASE ORAL at 09:07

## 2019-01-19 RX ADMIN — HEPARIN SODIUM 5000 UNITS: 5000 INJECTION INTRAVENOUS; SUBCUTANEOUS at 06:06

## 2019-01-19 RX ADMIN — ACETAMINOPHEN 650 MG: 325 TABLET ORAL at 06:07

## 2019-01-19 ASSESSMENT — PAIN DESCRIPTION - ONSET: ONSET: GRADUAL

## 2019-01-19 ASSESSMENT — PAIN DESCRIPTION - PAIN TYPE: TYPE: ACUTE PAIN

## 2019-01-19 ASSESSMENT — PAIN DESCRIPTION - DESCRIPTORS: DESCRIPTORS: ACHING;SORE

## 2019-01-19 ASSESSMENT — PAIN DESCRIPTION - ORIENTATION: ORIENTATION: RIGHT;LEFT

## 2019-01-19 ASSESSMENT — PAIN DESCRIPTION - LOCATION: LOCATION: LEG

## 2019-01-19 ASSESSMENT — PAIN - FUNCTIONAL ASSESSMENT: PAIN_FUNCTIONAL_ASSESSMENT: ACTIVITIES ARE NOT PREVENTED

## 2019-01-19 ASSESSMENT — PAIN DESCRIPTION - PROGRESSION: CLINICAL_PROGRESSION: GRADUALLY IMPROVING

## 2019-01-19 ASSESSMENT — PAIN SCALES - GENERAL: PAINLEVEL_OUTOF10: 3

## 2019-01-20 ENCOUNTER — CARE COORDINATION (OUTPATIENT)
Dept: CASE MANAGEMENT | Age: 84
End: 2019-01-20

## 2019-01-20 DIAGNOSIS — N17.9 AKI (ACUTE KIDNEY INJURY) (HCC): Primary | ICD-10-CM

## 2019-01-20 PROCEDURE — 1111F DSCHRG MED/CURRENT MED MERGE: CPT

## 2019-01-23 ENCOUNTER — TELEPHONE (OUTPATIENT)
Dept: INTERNAL MEDICINE CLINIC | Age: 84
End: 2019-01-23

## 2019-01-23 ENCOUNTER — CARE COORDINATION (OUTPATIENT)
Dept: CASE MANAGEMENT | Age: 84
End: 2019-01-23

## 2019-01-25 ENCOUNTER — HOSPITAL ENCOUNTER (OUTPATIENT)
Age: 84
Setting detail: SPECIMEN
Discharge: HOME | End: 2019-01-25
Payer: MEDICARE

## 2019-01-25 LAB
ANION GAP SERPL CALCULATED.3IONS-SCNC: 12 MMOL/L (ref 3–16)
BUN BLDV-MCNC: 22 MG/DL (ref 7–20)
CALCIUM SERPL-MCNC: 9.3 MG/DL (ref 8.3–10.6)
CHLORIDE BLD-SCNC: 102 MMOL/L (ref 99–110)
CO2: 26 MMOL/L (ref 21–32)
CREAT SERPL-MCNC: 1.2 MG/DL (ref 0.6–1.2)
GFR AFRICAN AMERICAN: 51
GFR NON-AFRICAN AMERICAN: 42
GLUCOSE BLD-MCNC: 115 MG/DL (ref 70–99)
POTASSIUM SERPL-SCNC: 4.7 MMOL/L (ref 3.5–5.1)
SODIUM BLD-SCNC: 140 MMOL/L (ref 136–145)

## 2019-01-25 PROCEDURE — 36415 COLL VENOUS BLD VENIPUNCTURE: CPT

## 2019-01-25 PROCEDURE — 80048 BASIC METABOLIC PNL TOTAL CA: CPT

## 2019-01-25 RX ORDER — ATORVASTATIN CALCIUM 20 MG/1
TABLET, FILM COATED ORAL
Qty: 30 TABLET | Refills: 2 | Status: SHIPPED | OUTPATIENT
Start: 2019-01-25 | End: 2019-04-25 | Stop reason: SDUPTHER

## 2019-01-28 ENCOUNTER — TELEPHONE (OUTPATIENT)
Dept: INTERNAL MEDICINE CLINIC | Age: 84
End: 2019-01-28

## 2019-01-30 ENCOUNTER — CARE COORDINATION (OUTPATIENT)
Dept: CASE MANAGEMENT | Age: 84
End: 2019-01-30

## 2019-02-01 ENCOUNTER — OFFICE VISIT (OUTPATIENT)
Dept: INTERNAL MEDICINE CLINIC | Age: 84
End: 2019-02-01

## 2019-02-01 VITALS
WEIGHT: 136 LBS | HEART RATE: 70 BPM | BODY MASS INDEX: 25.03 KG/M2 | SYSTOLIC BLOOD PRESSURE: 130 MMHG | DIASTOLIC BLOOD PRESSURE: 80 MMHG | HEIGHT: 62 IN

## 2019-02-01 DIAGNOSIS — N39.0 URINARY TRACT INFECTION WITHOUT HEMATURIA, SITE UNSPECIFIED: ICD-10-CM

## 2019-02-01 DIAGNOSIS — I48.0 PAROXYSMAL ATRIAL FIBRILLATION (HCC): ICD-10-CM

## 2019-02-01 DIAGNOSIS — I10 ESSENTIAL HYPERTENSION: ICD-10-CM

## 2019-02-01 DIAGNOSIS — I50.32 CHRONIC DIASTOLIC CHF (CONGESTIVE HEART FAILURE) (HCC): ICD-10-CM

## 2019-02-01 DIAGNOSIS — N17.9 ACUTE RENAL FAILURE, UNSPECIFIED ACUTE RENAL FAILURE TYPE (HCC): Primary | ICD-10-CM

## 2019-02-01 DIAGNOSIS — Z95.2 S/P MVR (MITRAL VALVE REPLACEMENT): ICD-10-CM

## 2019-02-01 DIAGNOSIS — I25.10 CORONARY ARTERY DISEASE INVOLVING NATIVE CORONARY ARTERY OF NATIVE HEART WITHOUT ANGINA PECTORIS: ICD-10-CM

## 2019-02-01 DIAGNOSIS — Z95.1 S/P CABG (CORONARY ARTERY BYPASS GRAFT): ICD-10-CM

## 2019-02-01 PROBLEM — B96.20 E COLI BACTEREMIA: Status: RESOLVED | Noted: 2018-09-10 | Resolved: 2019-02-01

## 2019-02-01 PROBLEM — R78.81 E COLI BACTEREMIA: Status: RESOLVED | Noted: 2018-09-10 | Resolved: 2019-02-01

## 2019-02-01 PROCEDURE — 1111F DSCHRG MED/CURRENT MED MERGE: CPT | Performed by: PHYSICIAN ASSISTANT

## 2019-02-01 PROCEDURE — 99214 OFFICE O/P EST MOD 30 MIN: CPT | Performed by: PHYSICIAN ASSISTANT

## 2019-02-01 RX ORDER — FUROSEMIDE 20 MG/1
TABLET ORAL
Qty: 30 TABLET | Refills: 0
Start: 2019-02-01 | End: 2019-07-11

## 2019-02-01 ASSESSMENT — ENCOUNTER SYMPTOMS
COUGH: 0
SHORTNESS OF BREATH: 0
SORE THROAT: 0
RHINORRHEA: 0
ABDOMINAL PAIN: 0
VOMITING: 0
NAUSEA: 0

## 2019-03-11 RX ORDER — PANTOPRAZOLE SODIUM 40 MG/1
TABLET, DELAYED RELEASE ORAL
Qty: 90 TABLET | Refills: 0 | Status: SHIPPED | OUTPATIENT
Start: 2019-03-11 | End: 2019-06-09 | Stop reason: SDUPTHER

## 2019-03-11 RX ORDER — FUROSEMIDE 40 MG/1
TABLET ORAL
Qty: 90 TABLET | Refills: 0 | Status: SHIPPED | OUTPATIENT
Start: 2019-03-11 | End: 2019-07-11 | Stop reason: SDUPTHER

## 2019-04-10 RX ORDER — CARVEDILOL 6.25 MG/1
TABLET ORAL
Qty: 180 TABLET | Refills: 0 | Status: SHIPPED | OUTPATIENT
Start: 2019-04-10 | End: 2019-07-11 | Stop reason: SDUPTHER

## 2019-04-18 ENCOUNTER — OFFICE VISIT (OUTPATIENT)
Dept: INTERNAL MEDICINE CLINIC | Age: 84
End: 2019-04-18

## 2019-04-18 VITALS
WEIGHT: 132 LBS | HEIGHT: 62 IN | DIASTOLIC BLOOD PRESSURE: 80 MMHG | BODY MASS INDEX: 24.29 KG/M2 | SYSTOLIC BLOOD PRESSURE: 136 MMHG | RESPIRATION RATE: 14 BRPM | HEART RATE: 70 BPM

## 2019-04-18 DIAGNOSIS — I48.0 PAROXYSMAL ATRIAL FIBRILLATION (HCC): ICD-10-CM

## 2019-04-18 DIAGNOSIS — I50.32 CHRONIC DIASTOLIC CHF (CONGESTIVE HEART FAILURE) (HCC): ICD-10-CM

## 2019-04-18 DIAGNOSIS — I25.10 CORONARY ARTERY DISEASE INVOLVING NATIVE CORONARY ARTERY OF NATIVE HEART WITHOUT ANGINA PECTORIS: ICD-10-CM

## 2019-04-18 DIAGNOSIS — G30.1 UNCOMPLICATED LATE ONSET ALZHEIMER'S DEMENTIA (HCC): ICD-10-CM

## 2019-04-18 DIAGNOSIS — Z95.1 S/P CABG (CORONARY ARTERY BYPASS GRAFT): ICD-10-CM

## 2019-04-18 DIAGNOSIS — F02.80 UNCOMPLICATED LATE ONSET ALZHEIMER'S DEMENTIA (HCC): ICD-10-CM

## 2019-04-18 DIAGNOSIS — Z95.2 S/P MVR (MITRAL VALVE REPLACEMENT): ICD-10-CM

## 2019-04-18 DIAGNOSIS — I10 ESSENTIAL HYPERTENSION: Primary | ICD-10-CM

## 2019-04-18 PROCEDURE — 99214 OFFICE O/P EST MOD 30 MIN: CPT | Performed by: INTERNAL MEDICINE

## 2019-04-18 ASSESSMENT — ENCOUNTER SYMPTOMS
ABDOMINAL PAIN: 0
WHEEZING: 0
RHINORRHEA: 0
COUGH: 0
NAUSEA: 0
VOMITING: 0
SHORTNESS OF BREATH: 0

## 2019-04-18 NOTE — PATIENT INSTRUCTIONS
Patient Self-Management Goal for Health Maintenance  Goal: I will schedule a yearly preventative care visit.   Barriers: none  Plan for overcoming my barriers: N/A  Confidence: 10/10  Anticipated Goal Completion Date: 7/18/19

## 2019-04-18 NOTE — PROGRESS NOTES
Subjective:      Patient ID: Thaddeus Bartlett is a 80 y.o. female. HPI  Patient is here for follow up of her medical problems. She has CAD. It is stable. No angina. On ASA. She sees cardiology. Her breathing is stable. No chest pain or dyspnea at rest. Mild dyspnea with exertion. No orthopnea or PND. No edema. No issues with CVA (it was hemorrhagic). She is on ASA. No new issues. No late effects. She has a porcine MVR. No palpitations or dyspnea. Her BP is stable. She is compliant. No issues with CHF. She gets dyspneic with exertion but no major issues. No pedal edema. No PND. She has seen cardiology. She has history of a fib. She is in NSR. She had her left atrial appendage ligation. No issues. She has esophagitis. She had stricture dilated. No further issues. No nausea. No emesis. Eating ok. No dysphagia. Review of Systems   Constitutional: Negative for appetite change and fatigue. HENT: Negative for postnasal drip and rhinorrhea. Respiratory: Negative for cough, shortness of breath and wheezing. Cardiovascular: Negative for chest pain, palpitations and leg swelling. Gastrointestinal: Negative for abdominal pain, nausea and vomiting. Musculoskeletal: Negative for joint swelling. Skin: Negative for rash. Neurological: Negative for light-headedness. Psychiatric/Behavioral: Negative for sleep disturbance.          Current Outpatient Medications:     carvedilol (COREG) 6.25 MG tablet, TAKE ONE TABLET BY MOUTH TWICE A DAY WITH MEALS, Disp: 180 tablet, Rfl: 0    pantoprazole (PROTONIX) 40 MG tablet, TAKE ONE TABLET BY MOUTH DAILY, Disp: 90 tablet, Rfl: 0    furosemide (LASIX) 40 MG tablet, TAKE half TABLET BY MOUTH DAILY, Disp: 90 tablet, Rfl: 0    furosemide (LASIX) 20 MG tablet, TAKE half TABLET BY MOUTH DAILY, Disp: 30 tablet, Rfl: 0    atorvastatin (LIPITOR) 20 MG tablet, TAKE ONE TABLET BY MOUTH DAILY, Disp: 30 tablet, Rfl: 2    aspirin 81 MG tablet, Take 81 mg by mouth daily. , Disp: , Rfl:     fexofenadine (ALLEGRA) 180 MG tablet, Take 180 mg by mouth daily as needed. , Disp: , Rfl:        There are no changes to past medical history, family history, social history or review of systems(except as noted in the history section) since prior note (all reviewed with patient). /80 (Site: Right Upper Arm, Position: Sitting, Cuff Size: Medium Adult)   Pulse 70   Resp 14   Ht 5' 2\" (1.575 m)   Wt 132 lb (59.9 kg)   BMI 24.14 kg/m²      Objective:   Physical Exam   Constitutional: She is oriented to person, place, and time. She appears well-developed and well-nourished. HENT:   Head: Normocephalic. Eyes: Pupils are equal, round, and reactive to light. Conjunctivae and EOM are normal.   Neck: Trachea normal and normal range of motion. Neck supple. No JVD present. Carotid bruit is present. No thyroid mass and no thyromegaly present. Cardiovascular: Normal rate, regular rhythm and normal heart sounds. Exam reveals no gallop. No murmur heard. Pulses:       Carotid pulses are on the right side with bruit, and on the left side with bruit. She has prosthetic valve sound     Pulmonary/Chest: Effort normal and breath sounds normal. No respiratory distress. She has no wheezes. She has no rales. Abdominal: Soft. Bowel sounds are normal. She exhibits no distension and no mass. There is no splenomegaly or hepatomegaly. There is no tenderness. Musculoskeletal: She exhibits edema (trace). Lymphadenopathy:     She has no cervical adenopathy. Neurological: She is alert and oriented to person, place, and time. She has normal strength. Skin: Skin is warm and dry. No rash noted. Psychiatric: She has a normal mood and affect. Her behavior is normal. Judgment and thought content normal.       Assessment:       Diagnosis Orders   1. Essential hypertension     2. Coronary artery disease involving native coronary artery of native heart without angina pectoris     3.  S/P MVR (mitral valve replacement)     4. Uncomplicated late onset Alzheimer's dementia     5. Paroxysmal atrial fibrillation (HCC)     6. S/P CABG (coronary artery bypass graft)     7. Chronic diastolic CHF (congestive heart failure) (HCC)            Plan:      A fib:  She is in NSR. She is not on warfarin. She had a bleed in the past.  She is doing well. She has had her left atrial appendage ligated. CAD:  Stable. No chest pain. On ASA. Sees cardiology. CHF: diastolic. On Lasix. No recent issues. No flare ups. Mitral regurgitation:  S/p valve replacement. No issues. HTN is controlled. She is compliant. Allergies seasonal. On prn Allegra. She has mild memory loss but doing ok overall. Discussed use, benefit, and side effects of prescribed medications. Barriers to medication compliance addressed. All patient questions answered. Pt voiced understanding.

## 2019-04-26 RX ORDER — ATORVASTATIN CALCIUM 20 MG/1
TABLET, FILM COATED ORAL
Qty: 30 TABLET | Refills: 2 | Status: SHIPPED | OUTPATIENT
Start: 2019-04-26 | End: 2019-07-11 | Stop reason: SDUPTHER

## 2019-06-10 RX ORDER — PANTOPRAZOLE SODIUM 40 MG/1
TABLET, DELAYED RELEASE ORAL
Qty: 90 TABLET | Refills: 0 | Status: SHIPPED | OUTPATIENT
Start: 2019-06-10 | End: 2019-09-04 | Stop reason: SDUPTHER

## 2019-07-11 ENCOUNTER — OFFICE VISIT (OUTPATIENT)
Dept: INTERNAL MEDICINE CLINIC | Age: 84
End: 2019-07-11

## 2019-07-11 VITALS
DIASTOLIC BLOOD PRESSURE: 70 MMHG | BODY MASS INDEX: 24.11 KG/M2 | HEIGHT: 62 IN | SYSTOLIC BLOOD PRESSURE: 130 MMHG | HEART RATE: 70 BPM | RESPIRATION RATE: 14 BRPM | WEIGHT: 131 LBS

## 2019-07-11 DIAGNOSIS — I25.10 CORONARY ARTERY DISEASE INVOLVING NATIVE CORONARY ARTERY OF NATIVE HEART WITHOUT ANGINA PECTORIS: ICD-10-CM

## 2019-07-11 DIAGNOSIS — I25.5 CARDIOMYOPATHY, ISCHEMIC: ICD-10-CM

## 2019-07-11 DIAGNOSIS — F02.80 UNCOMPLICATED LATE ONSET ALZHEIMER'S DEMENTIA (HCC): ICD-10-CM

## 2019-07-11 DIAGNOSIS — G30.1 UNCOMPLICATED LATE ONSET ALZHEIMER'S DEMENTIA (HCC): ICD-10-CM

## 2019-07-11 DIAGNOSIS — I10 ESSENTIAL HYPERTENSION: ICD-10-CM

## 2019-07-11 DIAGNOSIS — Z95.2 S/P MVR (MITRAL VALVE REPLACEMENT): ICD-10-CM

## 2019-07-11 DIAGNOSIS — I10 ESSENTIAL HYPERTENSION: Primary | ICD-10-CM

## 2019-07-11 DIAGNOSIS — I48.0 PAROXYSMAL ATRIAL FIBRILLATION (HCC): ICD-10-CM

## 2019-07-11 DIAGNOSIS — I50.32 CHRONIC DIASTOLIC CHF (CONGESTIVE HEART FAILURE) (HCC): ICD-10-CM

## 2019-07-11 LAB
A/G RATIO: 1.3 (ref 1.1–2.2)
ALBUMIN SERPL-MCNC: 4 G/DL (ref 3.4–5)
ALP BLD-CCNC: 114 U/L (ref 40–129)
ALT SERPL-CCNC: 14 U/L (ref 10–40)
ANION GAP SERPL CALCULATED.3IONS-SCNC: 14 MMOL/L (ref 3–16)
AST SERPL-CCNC: 26 U/L (ref 15–37)
BASOPHILS ABSOLUTE: 0.2 K/UL (ref 0–0.2)
BASOPHILS RELATIVE PERCENT: 2.4 %
BILIRUB SERPL-MCNC: 0.3 MG/DL (ref 0–1)
BUN BLDV-MCNC: 19 MG/DL (ref 7–20)
CALCIUM SERPL-MCNC: 9.1 MG/DL (ref 8.3–10.6)
CHLORIDE BLD-SCNC: 104 MMOL/L (ref 99–110)
CO2: 24 MMOL/L (ref 21–32)
CREAT SERPL-MCNC: 1.1 MG/DL (ref 0.6–1.2)
EOSINOPHILS ABSOLUTE: 0.1 K/UL (ref 0–0.6)
EOSINOPHILS RELATIVE PERCENT: 1.7 %
GFR AFRICAN AMERICAN: 57
GFR NON-AFRICAN AMERICAN: 47
GLOBULIN: 3.1 G/DL
GLUCOSE BLD-MCNC: 113 MG/DL (ref 70–99)
HCT VFR BLD CALC: 36.8 % (ref 36–48)
HEMOGLOBIN: 11.6 G/DL (ref 12–16)
LYMPHOCYTES ABSOLUTE: 1.1 K/UL (ref 1–5.1)
LYMPHOCYTES RELATIVE PERCENT: 14.6 %
MCH RBC QN AUTO: 27.6 PG (ref 26–34)
MCHC RBC AUTO-ENTMCNC: 31.5 G/DL (ref 31–36)
MCV RBC AUTO: 87.5 FL (ref 80–100)
MONOCYTES ABSOLUTE: 0.7 K/UL (ref 0–1.3)
MONOCYTES RELATIVE PERCENT: 9.3 %
NEUTROPHILS ABSOLUTE: 5.6 K/UL (ref 1.7–7.7)
NEUTROPHILS RELATIVE PERCENT: 72 %
PDW BLD-RTO: 15.8 % (ref 12.4–15.4)
PLATELET # BLD: 236 K/UL (ref 135–450)
PMV BLD AUTO: 8.7 FL (ref 5–10.5)
POTASSIUM SERPL-SCNC: 3.9 MMOL/L (ref 3.5–5.1)
RBC # BLD: 4.21 M/UL (ref 4–5.2)
SODIUM BLD-SCNC: 142 MMOL/L (ref 136–145)
TOTAL PROTEIN: 7.1 G/DL (ref 6.4–8.2)
WBC # BLD: 7.8 K/UL (ref 4–11)

## 2019-07-11 PROCEDURE — 99214 OFFICE O/P EST MOD 30 MIN: CPT | Performed by: INTERNAL MEDICINE

## 2019-07-11 RX ORDER — ATORVASTATIN CALCIUM 20 MG/1
20 TABLET, FILM COATED ORAL DAILY
Qty: 90 TABLET | Refills: 0 | Status: SHIPPED | OUTPATIENT
Start: 2019-07-11 | End: 2019-10-17 | Stop reason: SDUPTHER

## 2019-07-11 RX ORDER — CARVEDILOL 6.25 MG/1
6.25 TABLET ORAL 2 TIMES DAILY WITH MEALS
Qty: 180 TABLET | Refills: 0 | Status: SHIPPED | OUTPATIENT
Start: 2019-07-11 | End: 2019-10-09 | Stop reason: SDUPTHER

## 2019-07-11 RX ORDER — FUROSEMIDE 40 MG/1
TABLET ORAL
Qty: 90 TABLET | Refills: 0 | Status: SHIPPED | OUTPATIENT
Start: 2019-07-11 | End: 2019-10-17 | Stop reason: SDUPTHER

## 2019-07-11 ASSESSMENT — ENCOUNTER SYMPTOMS
WHEEZING: 0
SHORTNESS OF BREATH: 0
ABDOMINAL PAIN: 0
VOMITING: 0
RHINORRHEA: 0
NAUSEA: 0
COUGH: 0

## 2019-08-23 ENCOUNTER — TELEPHONE (OUTPATIENT)
Dept: INTERNAL MEDICINE CLINIC | Age: 84
End: 2019-08-23

## 2019-09-05 RX ORDER — PANTOPRAZOLE SODIUM 40 MG/1
TABLET, DELAYED RELEASE ORAL
Qty: 90 TABLET | Refills: 0 | Status: SHIPPED | OUTPATIENT
Start: 2019-09-05 | End: 2019-10-17 | Stop reason: SDUPTHER

## 2019-10-11 RX ORDER — CARVEDILOL 6.25 MG/1
TABLET ORAL
Qty: 180 TABLET | Refills: 0 | Status: SHIPPED | OUTPATIENT
Start: 2019-10-11 | End: 2020-01-10

## 2019-10-17 ENCOUNTER — OFFICE VISIT (OUTPATIENT)
Dept: INTERNAL MEDICINE CLINIC | Age: 84
End: 2019-10-17

## 2019-10-17 VITALS
BODY MASS INDEX: 24.29 KG/M2 | WEIGHT: 132 LBS | RESPIRATION RATE: 14 BRPM | SYSTOLIC BLOOD PRESSURE: 128 MMHG | HEART RATE: 80 BPM | HEIGHT: 62 IN | DIASTOLIC BLOOD PRESSURE: 80 MMHG

## 2019-10-17 DIAGNOSIS — Z95.1 S/P CABG (CORONARY ARTERY BYPASS GRAFT): ICD-10-CM

## 2019-10-17 DIAGNOSIS — I50.32 CHRONIC DIASTOLIC CHF (CONGESTIVE HEART FAILURE) (HCC): ICD-10-CM

## 2019-10-17 DIAGNOSIS — I10 ESSENTIAL HYPERTENSION: ICD-10-CM

## 2019-10-17 DIAGNOSIS — I25.5 CARDIOMYOPATHY, ISCHEMIC: ICD-10-CM

## 2019-10-17 DIAGNOSIS — Z95.2 S/P MVR (MITRAL VALVE REPLACEMENT): ICD-10-CM

## 2019-10-17 DIAGNOSIS — I25.10 CORONARY ARTERY DISEASE INVOLVING NATIVE CORONARY ARTERY OF NATIVE HEART WITHOUT ANGINA PECTORIS: ICD-10-CM

## 2019-10-17 DIAGNOSIS — G30.1 UNCOMPLICATED LATE ONSET ALZHEIMER'S DEMENTIA (HCC): ICD-10-CM

## 2019-10-17 DIAGNOSIS — F02.80 UNCOMPLICATED LATE ONSET ALZHEIMER'S DEMENTIA (HCC): ICD-10-CM

## 2019-10-17 DIAGNOSIS — I48.0 PAROXYSMAL ATRIAL FIBRILLATION (HCC): Primary | ICD-10-CM

## 2019-10-17 PROCEDURE — 99214 OFFICE O/P EST MOD 30 MIN: CPT | Performed by: INTERNAL MEDICINE

## 2019-10-17 RX ORDER — ATORVASTATIN CALCIUM 20 MG/1
20 TABLET, FILM COATED ORAL DAILY
Qty: 90 TABLET | Refills: 0 | Status: SHIPPED | OUTPATIENT
Start: 2019-10-17 | End: 2020-01-22

## 2019-10-17 RX ORDER — FUROSEMIDE 40 MG/1
TABLET ORAL
Qty: 90 TABLET | Refills: 0 | Status: ON HOLD | OUTPATIENT
Start: 2019-10-17 | End: 2020-08-28 | Stop reason: SDUPTHER

## 2019-10-17 RX ORDER — PANTOPRAZOLE SODIUM 40 MG/1
TABLET, DELAYED RELEASE ORAL
Qty: 90 TABLET | Refills: 0 | Status: SHIPPED | OUTPATIENT
Start: 2019-10-17 | End: 2020-03-05

## 2019-10-17 ASSESSMENT — ENCOUNTER SYMPTOMS
SHORTNESS OF BREATH: 0
WHEEZING: 0
COUGH: 0
ABDOMINAL PAIN: 0
NAUSEA: 0
VOMITING: 0
RHINORRHEA: 0

## 2019-10-17 ASSESSMENT — PATIENT HEALTH QUESTIONNAIRE - PHQ9
SUM OF ALL RESPONSES TO PHQ QUESTIONS 1-9: 0
SUM OF ALL RESPONSES TO PHQ QUESTIONS 1-9: 0
SUM OF ALL RESPONSES TO PHQ9 QUESTIONS 1 & 2: 0
2. FEELING DOWN, DEPRESSED OR HOPELESS: 0
1. LITTLE INTEREST OR PLEASURE IN DOING THINGS: 0

## 2019-11-27 ENCOUNTER — NURSE ONLY (OUTPATIENT)
Dept: INTERNAL MEDICINE CLINIC | Age: 84
End: 2019-11-27

## 2019-11-27 ENCOUNTER — OFFICE VISIT (OUTPATIENT)
Dept: INTERNAL MEDICINE CLINIC | Age: 84
End: 2019-11-27

## 2019-11-27 VITALS
WEIGHT: 133 LBS | RESPIRATION RATE: 18 BRPM | SYSTOLIC BLOOD PRESSURE: 144 MMHG | HEART RATE: 77 BPM | BODY MASS INDEX: 24.48 KG/M2 | TEMPERATURE: 97.6 F | DIASTOLIC BLOOD PRESSURE: 72 MMHG | HEIGHT: 62 IN | OXYGEN SATURATION: 98 %

## 2019-11-27 DIAGNOSIS — Z23 NEED FOR INFLUENZA VACCINATION: Primary | ICD-10-CM

## 2019-11-27 DIAGNOSIS — F41.9 ANXIETY: Primary | ICD-10-CM

## 2019-11-27 PROCEDURE — G0008 ADMIN INFLUENZA VIRUS VAC: HCPCS | Performed by: INTERNAL MEDICINE

## 2019-11-27 PROCEDURE — 99213 OFFICE O/P EST LOW 20 MIN: CPT | Performed by: PHYSICIAN ASSISTANT

## 2019-11-27 PROCEDURE — 90662 IIV NO PRSV INCREASED AG IM: CPT | Performed by: INTERNAL MEDICINE

## 2019-11-27 RX ORDER — ESCITALOPRAM OXALATE 5 MG/1
5 TABLET ORAL DAILY
Qty: 30 TABLET | Refills: 0 | Status: SHIPPED | OUTPATIENT
Start: 2019-11-27 | End: 2019-12-31 | Stop reason: SDUPTHER

## 2019-12-13 ASSESSMENT — ENCOUNTER SYMPTOMS
SHORTNESS OF BREATH: 0
COUGH: 0
NAUSEA: 0
ABDOMINAL PAIN: 0
VOMITING: 0

## 2019-12-31 DIAGNOSIS — F41.9 ANXIETY: ICD-10-CM

## 2019-12-31 RX ORDER — ESCITALOPRAM OXALATE 5 MG/1
5 TABLET ORAL DAILY
Qty: 30 TABLET | Refills: 0 | Status: SHIPPED | OUTPATIENT
Start: 2019-12-31 | End: 2020-01-22 | Stop reason: SDUPTHER

## 2020-01-10 RX ORDER — CARVEDILOL 6.25 MG/1
TABLET ORAL
Qty: 180 TABLET | Refills: 0 | Status: SHIPPED | OUTPATIENT
Start: 2020-01-10 | End: 2020-04-06

## 2020-01-22 ENCOUNTER — OFFICE VISIT (OUTPATIENT)
Dept: INTERNAL MEDICINE CLINIC | Age: 85
End: 2020-01-22

## 2020-01-22 VITALS
HEART RATE: 80 BPM | HEIGHT: 62 IN | DIASTOLIC BLOOD PRESSURE: 80 MMHG | BODY MASS INDEX: 24.29 KG/M2 | WEIGHT: 132 LBS | SYSTOLIC BLOOD PRESSURE: 130 MMHG | RESPIRATION RATE: 14 BRPM

## 2020-01-22 DIAGNOSIS — I25.10 CORONARY ARTERY DISEASE INVOLVING NATIVE CORONARY ARTERY OF NATIVE HEART WITHOUT ANGINA PECTORIS: ICD-10-CM

## 2020-01-22 LAB
A/G RATIO: 1 (ref 1.1–2.2)
ALBUMIN SERPL-MCNC: 3.9 G/DL (ref 3.4–5)
ALP BLD-CCNC: 101 U/L (ref 40–129)
ALT SERPL-CCNC: 13 U/L (ref 10–40)
ANION GAP SERPL CALCULATED.3IONS-SCNC: 15 MMOL/L (ref 3–16)
AST SERPL-CCNC: 27 U/L (ref 15–37)
BASOPHILS ABSOLUTE: 0.1 K/UL (ref 0–0.2)
BASOPHILS RELATIVE PERCENT: 1.1 %
BILIRUB SERPL-MCNC: 0.3 MG/DL (ref 0–1)
BUN BLDV-MCNC: 18 MG/DL (ref 7–20)
CALCIUM SERPL-MCNC: 9.5 MG/DL (ref 8.3–10.6)
CHLORIDE BLD-SCNC: 98 MMOL/L (ref 99–110)
CHOLESTEROL, TOTAL: 148 MG/DL (ref 0–199)
CO2: 26 MMOL/L (ref 21–32)
CREAT SERPL-MCNC: 1 MG/DL (ref 0.6–1.2)
EOSINOPHILS ABSOLUTE: 0.1 K/UL (ref 0–0.6)
EOSINOPHILS RELATIVE PERCENT: 1.8 %
GFR AFRICAN AMERICAN: >60
GFR NON-AFRICAN AMERICAN: 52
GLOBULIN: 4 G/DL
GLUCOSE BLD-MCNC: 87 MG/DL (ref 70–99)
HCT VFR BLD CALC: 36.9 % (ref 36–48)
HDLC SERPL-MCNC: 52 MG/DL (ref 40–60)
HEMOGLOBIN: 11.9 G/DL (ref 12–16)
LDL CHOLESTEROL CALCULATED: 73 MG/DL
LYMPHOCYTES ABSOLUTE: 1.3 K/UL (ref 1–5.1)
LYMPHOCYTES RELATIVE PERCENT: 15.6 %
MCH RBC QN AUTO: 28.2 PG (ref 26–34)
MCHC RBC AUTO-ENTMCNC: 32.3 G/DL (ref 31–36)
MCV RBC AUTO: 87.4 FL (ref 80–100)
MONOCYTES ABSOLUTE: 0.6 K/UL (ref 0–1.3)
MONOCYTES RELATIVE PERCENT: 7.8 %
NEUTROPHILS ABSOLUTE: 6 K/UL (ref 1.7–7.7)
NEUTROPHILS RELATIVE PERCENT: 73.7 %
PDW BLD-RTO: 15.1 % (ref 12.4–15.4)
PLATELET # BLD: 245 K/UL (ref 135–450)
PMV BLD AUTO: 7.8 FL (ref 5–10.5)
POTASSIUM SERPL-SCNC: 4.8 MMOL/L (ref 3.5–5.1)
RBC # BLD: 4.22 M/UL (ref 4–5.2)
SODIUM BLD-SCNC: 139 MMOL/L (ref 136–145)
TOTAL PROTEIN: 7.9 G/DL (ref 6.4–8.2)
TRIGL SERPL-MCNC: 114 MG/DL (ref 0–150)
URIC ACID, SERUM: 5.9 MG/DL (ref 2.6–6)
VLDLC SERPL CALC-MCNC: 23 MG/DL
WBC # BLD: 8.1 K/UL (ref 4–11)

## 2020-01-22 PROCEDURE — 99214 OFFICE O/P EST MOD 30 MIN: CPT | Performed by: INTERNAL MEDICINE

## 2020-01-22 RX ORDER — ESCITALOPRAM OXALATE 10 MG/1
10 TABLET ORAL DAILY
Qty: 30 TABLET | Refills: 2 | Status: SHIPPED | OUTPATIENT
Start: 2020-01-22 | End: 2020-04-30 | Stop reason: SDUPTHER

## 2020-01-22 RX ORDER — ATORVASTATIN CALCIUM 20 MG/1
TABLET, FILM COATED ORAL
Qty: 90 TABLET | Refills: 0 | Status: SHIPPED | OUTPATIENT
Start: 2020-01-22 | End: 2020-04-16

## 2020-01-22 ASSESSMENT — ENCOUNTER SYMPTOMS
NAUSEA: 0
SHORTNESS OF BREATH: 0
COUGH: 0
VOMITING: 0
ABDOMINAL PAIN: 0
RHINORRHEA: 1
WHEEZING: 0

## 2020-01-22 NOTE — PROGRESS NOTES
native heart without angina pectoris  escitalopram (LEXAPRO) 10 MG tablet    Comprehensive Metabolic Panel    CBC Auto Differential    Lipid Panel    Uric Acid   2. Anxiety     3. Paroxysmal atrial fibrillation (HCC)     4. Cardiomyopathy, ischemic     5. Chronic diastolic CHF (congestive heart failure) ([facility]     6. Essential hypertension     7. 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 S/P CABG (coronary artery bypass graft)     9. S/P MVR (mitral valve replacement)     10. Uncomplicated late onset Alzheimer's dementia ([facility]            Plan:      A fib:  She is in NSR. She is not on warfarin. She had a bleed in the past.  She is doing well. She has had her left atrial appendage ligated. CAD:  Stable. No chest pain. On ASA. Sees cardiology once a year. CHF: diastolic. On Lasix. No recent issues. No flare ups. She does urinate often. Mitral regurgitation:  S/p valve replacement. No issues. HTN is controlled. She is compliant. Allergies seasonal. On prn Allegra. She has mild memory loss but doing ok overall. She likely has dementia. No change from the previous visit. She had a mild nose bleed this am. Monitor for now. Discussed use, benefit, and side effects of prescribed medications. Barriers to medication compliance addressed. All patient questions answered. Pt voiced understanding.

## 2020-03-05 RX ORDER — PANTOPRAZOLE SODIUM 40 MG/1
TABLET, DELAYED RELEASE ORAL
Qty: 90 TABLET | Refills: 0 | Status: SHIPPED | OUTPATIENT
Start: 2020-03-05 | End: 2020-06-03

## 2020-04-06 RX ORDER — CARVEDILOL 6.25 MG/1
TABLET ORAL
Qty: 180 TABLET | Refills: 0 | Status: SHIPPED | OUTPATIENT
Start: 2020-04-06 | End: 2020-07-06

## 2020-04-16 RX ORDER — ATORVASTATIN CALCIUM 20 MG/1
TABLET, FILM COATED ORAL
Qty: 90 TABLET | Refills: 0 | Status: SHIPPED | OUTPATIENT
Start: 2020-04-16

## 2020-04-22 ENCOUNTER — VIRTUAL VISIT (OUTPATIENT)
Dept: INTERNAL MEDICINE CLINIC | Age: 85
End: 2020-04-22

## 2020-04-22 VITALS
RESPIRATION RATE: 14 BRPM | WEIGHT: 133.2 LBS | SYSTOLIC BLOOD PRESSURE: 134 MMHG | DIASTOLIC BLOOD PRESSURE: 78 MMHG | BODY MASS INDEX: 24.51 KG/M2 | HEIGHT: 62 IN | HEART RATE: 77 BPM

## 2020-04-22 PROCEDURE — 99214 OFFICE O/P EST MOD 30 MIN: CPT | Performed by: INTERNAL MEDICINE

## 2020-04-22 ASSESSMENT — ENCOUNTER SYMPTOMS
ABDOMINAL PAIN: 0
NAUSEA: 0
WHEEZING: 0
VOMITING: 0
SHORTNESS OF BREATH: 1
RHINORRHEA: 0

## 2020-04-22 NOTE — PROGRESS NOTES
2020    TELEHEALTH EVALUATION -- Audio/Visual (During WSTDD-54 public health emergency)    HPI:    Kalpana Lowe (:  1931) has requested an audio/video evaluation for the following concern(s):    Patient is here for follow up of her medical problems.     She has CAD. It is stable. No angina. On ASA. She sees cardiology. Her breathing is stable. No chest pain Daughter has noted that patient is having mild dyspnea with exertion. No orthopnea or PND. Mild pedal edema. She does take Lasix. No issues with CVA (it was hemorrhagic). She is on ASA. No new issues. No late effects. She has a porcine MVR. No palpitations or dyspnea. Doing well. Her BP is stable. She is compliant. She has diastolic CHF. She gets dyspneic with exertion but no major issues. Some pedal edema. No PND. She has seen cardiology. She has history of a fib. She is in NSR. She had her left atrial appendage ligation. No issues.     She has no issues with dysphagia.       Review of Systems   Constitutional: Negative for appetite change and fatigue. HENT: Negative for postnasal drip and rhinorrhea. Respiratory: Positive for shortness of breath. Negative for wheezing. Cardiovascular: Positive for leg swelling. Negative for chest pain and palpitations. Gastrointestinal: Negative for abdominal pain, nausea and vomiting. Musculoskeletal: Negative for joint swelling. Skin: Negative for rash. Neurological: Negative for light-headedness. Psychiatric/Behavioral: Negative for sleep disturbance. Prior to Visit Medications    Medication Sig Taking?  Authorizing Provider   atorvastatin (LIPITOR) 20 MG tablet TAKE ONE TABLET BY MOUTH DAILY  Christina Ceron MD   carvedilol (COREG) 6.25 MG tablet TAKE ONE TABLET BY MOUTH TWICE A DAY WITH MEALS  Christina Ceron MD   pantoprazole (PROTONIX) 40 MG tablet TAKE ONE TABLET BY MOUTH DAILY  Christina Ceron MD   escitalopram (LEXAPRO) 10 MG tablet Take 1 GASTROINTESTINAL ENDOSCOPY  10/03/2016   ,   Family History   Problem Relation Age of Onset    Heart Disease Mother     High Blood Pressure Mother        PHYSICAL EXAMINATION:  [ INSTRUCTIONS:  \"[x]\" Indicates a positive item  \"[]\" Indicates a negative item  -- DELETE ALL ITEMS NOT EXAMINED]  Vital Signs: (As obtained by patient/caregiver or practitioner observation)    Blood pressure-  Heart rate-    Respiratory rate-    Temperature-  Pulse oximetry-        Constitutional: [x] Appears well-developed and well-nourished [x] No apparent distress                           Mental status  [x] Alert and awake  [x] Oriented to person/place/time [x]Able to follow commands       Eyes:  EOM    [x]  Normal  [] Abnormal-  Sclera  [x]  Normal  [] Abnormal -             HENT:   [x] Normocephalic, atraumatic. [] Abnormal   [x] Mouth/Throat: Mucous membranes are moist.      External Ears [x] Normal  [] Abnormal-      Neck: [x] No visualized mass      Pulmonary/Chest: [x] Respiratory effort normal.  [x] No visualized signs of difficulty breathing or respiratory distress        [] Abnormal-      Musculoskeletal:   [x] Normal gait with no signs of ataxia         [x] Normal range of motion of neck        [] Abnormal-         Neurological:        [x] No Facial Asymmetry (Cranial nerve 7 motor function) (limited exam to video visit)                       [] No gaze palsy        [] Abnormal-         Skin:                     [] No significant exanthematous lesions or discoloration noted on facial skin         [] Abnormal-                                  Psychiatric:           [x] Normal Affect [x] No Hallucinations        [] Abnormal-        Other pertinent observable physical exam findings-     ASSESSMENT/PLAN:  Hypertension  Well controlled. Chronic diastolic CHF (congestive heart failure) (HCC)  Some worse. Will have her take and extra lasix for 3-5 days. Cardiomyopathy, ischemic  Will take an extra lasix.  No CP    CAD (coronary artery disease)  Stable. Atrial fibrillation  Stable. Uncomplicated late onset Alzheimer's dementia  No new issues. Follow up with PCP    Cayden Pantoja is a 80 y.o. female being evaluated by a Virtual Visit (video visit) encounter to address concerns as mentioned above. A caregiver was present when appropriate. Due to this being a TeleHealth encounter ([facility]), evaluation of the following organ systems was limited: Vitals/Constitutional/EENT/Resp/CV/GI//MS/Neuro/Skin/Heme-Lymph-Imm. Pursuant to the emergency declaration under the [address] [place] [address] authority and the [employer] and [employer] Act, this Virtual Visit was conducted with patient's (and/or legal guardian's) consent, to reduce the patient's risk of exposure to COVID-19 and provide necessary medical care. The patient (and/or legal guardian) has also been advised to contact this office for worsening conditions or problems, and seek emergency medical treatment and/or call 911 if deemed necessary. Services were provided through a video synchronous discussion virtually to substitute for in-person clinic visit. Patient and provider were located at their individual homes. --Erik Rai MD on 4/22/2020 at 3:51 PM    An electronic signature was used to authenticate this note.

## 2020-04-29 NOTE — TELEPHONE ENCOUNTER
----- Message from Krystyna Crow sent at 4/29/2020  3:47 PM EDT -----  Contact: pt- [phone]  She needs a refill on her escitalopram called into [facility] in [place] at [phone]-[facility] will be a new pharm for her -last appt- 4-22-20-lr

## 2020-04-30 RX ORDER — ESCITALOPRAM OXALATE 10 MG/1
10 TABLET ORAL DAILY
Qty: 30 TABLET | Refills: 2 | Status: SHIPPED | OUTPATIENT
Start: 2020-04-30 | End: 2020-09-04 | Stop reason: SDUPTHER

## 2020-06-03 RX ORDER — PANTOPRAZOLE SODIUM 40 MG/1
TABLET, DELAYED RELEASE ORAL
Qty: 90 TABLET | Refills: 0 | Status: SHIPPED | OUTPATIENT
Start: 2020-06-03 | End: 2020-09-04

## 2020-07-06 RX ORDER — CARVEDILOL 6.25 MG/1
TABLET ORAL
Qty: 180 TABLET | Refills: 0 | Status: SHIPPED | OUTPATIENT
Start: 2020-07-06

## 2020-08-17 ENCOUNTER — OFFICE VISIT (OUTPATIENT)
Dept: INTERNAL MEDICINE CLINIC | Age: 85
End: 2020-08-17

## 2020-08-17 VITALS
RESPIRATION RATE: 14 BRPM | HEART RATE: 85 BPM | SYSTOLIC BLOOD PRESSURE: 144 MMHG | HEIGHT: 62 IN | DIASTOLIC BLOOD PRESSURE: 80 MMHG | WEIGHT: 134 LBS | BODY MASS INDEX: 24.66 KG/M2

## 2020-08-17 PROCEDURE — 99212 OFFICE O/P EST SF 10 MIN: CPT | Performed by: NURSE PRACTITIONER

## 2020-08-17 RX ORDER — NITROFURANTOIN 25; 75 MG/1; MG/1
100 CAPSULE ORAL 2 TIMES DAILY
Qty: 14 CAPSULE | Refills: 0 | Status: SHIPPED | OUTPATIENT
Start: 2020-08-17 | End: 2020-08-28 | Stop reason: HOSPADM

## 2020-08-17 NOTE — PROGRESS NOTES
Chief Complaint:   Nithya Lopez is a 80 y.o. female who presents for   Chief Complaint   Patient presents with    Urinary Tract Infection        HPI    Patient presents today to the office with c/o possible UTI. Per family member, she has been argumentative with people. She urinates frequently. She reports slight dysuria. Review of Systems  Review of Systems   Constitutional: Negative for fatigue and fever. Respiratory: Negative for cough and shortness of breath. Cardiovascular: Negative for chest pain. Gastrointestinal: Negative for abdominal pain, diarrhea, nausea and vomiting. Genitourinary: Positive for dysuria and frequency. Allergies  Morphine; Warfarin; Cefuroxime; Levofloxacin [levofloxacin]; and Azithromycin      Vitals  BP (!) 144/80 (Site: Right Upper Arm, Position: Sitting)   Pulse 85   Resp 14   Ht 5' 2\" (1.575 m)   Wt 134 lb (60.8 kg)   BMI 24.51 kg/m²     Current Medications  Current Outpatient Medications   Medication Sig Dispense Refill    carvedilol (COREG) 6.25 MG tablet TAKE ONE TABLET BY MOUTH TWICE A DAY WITH MEALS 180 tablet 0    pantoprazole (PROTONIX) 40 MG tablet TAKE ONE TABLET BY MOUTH DAILY 90 tablet 0    escitalopram (LEXAPRO) 10 MG tablet Take 1 tablet by mouth daily 30 tablet 2    atorvastatin (LIPITOR) 20 MG tablet TAKE ONE TABLET BY MOUTH DAILY 90 tablet 0    furosemide (LASIX) 40 MG tablet TAKE half TABLET BY MOUTH DAILY 90 tablet 0    aspirin 81 MG tablet Take 81 mg by mouth daily.  fexofenadine (ALLEGRA) 180 MG tablet Take 180 mg by mouth daily as needed. No current facility-administered medications for this visit.         Past Medical History  Past Medical History:   Diagnosis Date    Acute blood loss anemia 1/13/2012    Atrial fib/flutter, transient     Bacteremia 09/08/2018    E coli    CAD (coronary artery disease)     Cardiogenic shock ([facility] 1/6/2012    [facility]est[facility]     [facility] 11/26/2014    Encephalopathy, metabolic 4/55/4924    WNL EEG and CT HEAD. 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 2/7/2017    Pneumonia 2/6/2013    Res[facility] failure, [facility] 6/8/2286    Uncomplicated late onset [facility]'s dementia ([facility] 12/13/2018    Unspecified cerebral artery occlusion with cerebral infarction        Social History  Social History     Socioeconomic History    Marital status:       Spouse name: Not on file    Number of children: Not on file    Years of education: Not on file    Highest education level: Not on file   Occupational History    Not on file   Social Needs    Financial resource strain: Not on file    Food insecurity     Worry: Not on file     Inability: Not on file    Transportation needs     Medical: Not on file     Non-medical: Not on file   Tobacco Use    Smoking status: Passive Smoke Exposure - Never Smoker    Smokeless tobacco: Never Used   Substance and Sexual Activity    Alcohol use: No    Drug use: No    Sexual activity: Never     Partners: Male   Lifestyle    Physical activity     Days per week: Not on file     Minutes per session: Not on file    Stress: Not on file   Relationships    Social connections     Talks on phone: Not on file     Gets together: Not on file     Attends Hindu service: Not on file     Active member of club or organization: Not on file     Attends meetings of clubs or organizations: Not on file     Relationship status: Not on file    Intimate partner violence     Fear of current or ex partner: Not on file     Emotionally abused: Not on file     Physically abused: Not on file     Forced sexual activity: Not on file   Other Topics Concern    Not

## 2020-08-18 ASSESSMENT — ENCOUNTER SYMPTOMS
NAUSEA: 0
VOMITING: 0
SHORTNESS OF BREATH: 0
COUGH: 0
DIARRHEA: 0
ABDOMINAL PAIN: 0

## 2020-08-19 LAB
ORGANISM: ABNORMAL
URINE CULTURE, ROUTINE: ABNORMAL
URINE CULTURE, ROUTINE: ABNORMAL

## 2020-08-24 ENCOUNTER — HOSPITAL ENCOUNTER (INPATIENT)
Age: 85
LOS: 3 days | Discharge: HOME | DRG: 280 | End: 2020-08-28
Attending: EMERGENCY MEDICINE | Admitting: INTERNAL MEDICINE
Payer: MEDICARE

## 2020-08-24 ENCOUNTER — APPOINTMENT (OUTPATIENT)
Dept: CT IMAGING | Age: 85
DRG: 280 | End: 2020-08-24
Payer: MEDICARE

## 2020-08-24 ENCOUNTER — APPOINTMENT (OUTPATIENT)
Dept: GENERAL RADIOLOGY | Age: 85
DRG: 280 | End: 2020-08-24
Payer: MEDICARE

## 2020-08-24 LAB
A/G RATIO: 1 (ref 1.1–2.2)
ALBUMIN SERPL-MCNC: 3.9 G/DL (ref 3.4–5)
ALP BLD-CCNC: 105 U/L (ref 40–129)
ALT SERPL-CCNC: 10 U/L (ref 10–40)
ANION GAP SERPL CALCULATED.3IONS-SCNC: 10 MMOL/L (ref 3–16)
AST SERPL-CCNC: 21 U/L (ref 15–37)
BASE EXCESS VENOUS: 1.4 MMOL/L (ref -3–3)
BASOPHILS ABSOLUTE: 0.2 K/UL (ref 0–0.2)
BASOPHILS RELATIVE PERCENT: 1 %
BILIRUB SERPL-MCNC: 0.4 MG/DL (ref 0–1)
BUN BLDV-MCNC: 17 MG/DL (ref 7–20)
CALCIUM SERPL-MCNC: 9 MG/DL (ref 8.3–10.6)
CARBOXYHEMOGLOBIN: 1.1 % (ref 0–1.5)
CHLORIDE BLD-SCNC: 102 MMOL/L (ref 99–110)
CO2: 27 MMOL/L (ref 21–32)
CREAT SERPL-MCNC: 1 MG/DL (ref 0.6–1.2)
D DIMER: 876 NG/ML DDU (ref 0–229)
EOSINOPHILS ABSOLUTE: 0.9 K/UL (ref 0–0.6)
EOSINOPHILS RELATIVE PERCENT: 4.7 %
GFR AFRICAN AMERICAN: >60
GFR NON-AFRICAN AMERICAN: 52
GLOBULIN: 3.8 G/DL
GLUCOSE BLD-MCNC: 152 MG/DL (ref 70–99)
HCO3 VENOUS: 26.7 MMOL/L (ref 23–29)
HCT VFR BLD CALC: 32.9 % (ref 36–48)
HEMOGLOBIN: 10.4 G/DL (ref 12–16)
INR BLD: 1.02 (ref 0.86–1.14)
LACTIC ACID: 2.8 MMOL/L (ref 0.4–2)
LYMPHOCYTES ABSOLUTE: 0.8 K/UL (ref 1–5.1)
LYMPHOCYTES RELATIVE PERCENT: 4.3 %
MCH RBC QN AUTO: 27.6 PG (ref 26–34)
MCHC RBC AUTO-ENTMCNC: 31.7 G/DL (ref 31–36)
MCV RBC AUTO: 87.1 FL (ref 80–100)
METHEMOGLOBIN VENOUS: 0.1 %
MONOCYTES ABSOLUTE: 0.9 K/UL (ref 0–1.3)
MONOCYTES RELATIVE PERCENT: 5.1 %
NEUTROPHILS ABSOLUTE: 15.5 K/UL (ref 1.7–7.7)
NEUTROPHILS RELATIVE PERCENT: 84.9 %
O2 CONTENT, VEN: 15 VOL %
O2 SAT, VEN: 93 %
O2 THERAPY: ABNORMAL
PCO2, VEN: 45.2 MMHG (ref 40–50)
PDW BLD-RTO: 16.1 % (ref 12.4–15.4)
PH VENOUS: 7.39 (ref 7.35–7.45)
PLATELET # BLD: 329 K/UL (ref 135–450)
PMV BLD AUTO: 7.5 FL (ref 5–10.5)
PO2, VEN: 66.9 MMHG (ref 25–40)
POTASSIUM REFLEX MAGNESIUM: 3.9 MMOL/L (ref 3.5–5.1)
PRO-BNP: 2747 PG/ML (ref 0–449)
PROTHROMBIN TIME: 11.8 SEC (ref 10–13.2)
RBC # BLD: 3.78 M/UL (ref 4–5.2)
SODIUM BLD-SCNC: 139 MMOL/L (ref 136–145)
TCO2 CALC VENOUS: 28 MMOL/L
TOTAL PROTEIN: 7.7 G/DL (ref 6.4–8.2)
TROPONIN: 0.11 NG/ML
WBC # BLD: 18.3 K/UL (ref 4–11)

## 2020-08-24 PROCEDURE — 2580000003 HC RX 258: Performed by: EMERGENCY MEDICINE

## 2020-08-24 PROCEDURE — 93005 ELECTROCARDIOGRAM TRACING: CPT | Performed by: EMERGENCY MEDICINE

## 2020-08-24 PROCEDURE — 83880 ASSAY OF NATRIURETIC PEPTIDE: CPT

## 2020-08-24 PROCEDURE — 6360000002 HC RX W HCPCS: Performed by: EMERGENCY MEDICINE

## 2020-08-24 PROCEDURE — 82803 BLOOD GASES ANY COMBINATION: CPT

## 2020-08-24 PROCEDURE — 87040 BLOOD CULTURE FOR BACTERIA: CPT

## 2020-08-24 PROCEDURE — U0003 INFECTIOUS AGENT DETECTION BY NUCLEIC ACID (DNA OR RNA); SEVERE ACUTE RESPIRATORY SYNDROME CORONAVIRUS 2 (SARS-COV-2) (CORONAVIRUS DISEASE [COVID-19]), AMPLIFIED PROBE TECHNIQUE, MAKING USE OF HIGH THROUGHPUT TECHNOLOGIES AS DESCRIBED BY CMS-2020-01-R: HCPCS

## 2020-08-24 PROCEDURE — 85379 FIBRIN DEGRADATION QUANT: CPT

## 2020-08-24 PROCEDURE — 99285 EMERGENCY DEPT VISIT HI MDM: CPT

## 2020-08-24 PROCEDURE — 83605 ASSAY OF LACTIC ACID: CPT

## 2020-08-24 PROCEDURE — 71045 X-RAY EXAM CHEST 1 VIEW: CPT

## 2020-08-24 PROCEDURE — 85025 COMPLETE CBC W/AUTO DIFF WBC: CPT

## 2020-08-24 PROCEDURE — 96365 THER/PROPH/DIAG IV INF INIT: CPT

## 2020-08-24 PROCEDURE — 80053 COMPREHEN METABOLIC PANEL: CPT

## 2020-08-24 PROCEDURE — 85610 PROTHROMBIN TIME: CPT

## 2020-08-24 PROCEDURE — 84484 ASSAY OF TROPONIN QUANT: CPT

## 2020-08-24 RX ORDER — 0.9 % SODIUM CHLORIDE 0.9 %
500 INTRAVENOUS SOLUTION INTRAVENOUS ONCE
Status: COMPLETED | OUTPATIENT
Start: 2020-08-24 | End: 2020-08-25

## 2020-08-24 RX ADMIN — SODIUM CHLORIDE 3 G: 900 INJECTION INTRAVENOUS at 23:10

## 2020-08-24 RX ADMIN — SODIUM CHLORIDE 500 ML: 9 INJECTION, SOLUTION INTRAVENOUS at 23:09

## 2020-08-25 PROBLEM — G30.1 UNCOMPLICATED LATE ONSET ALZHEIMER'S DEMENTIA (HCC): Chronic | Status: ACTIVE | Noted: 2018-12-13

## 2020-08-25 PROBLEM — J81.0 FLASH PULMONARY EDEMA (HCC): Status: ACTIVE | Noted: 2020-08-25

## 2020-08-25 PROBLEM — Z20.822 SUSPECTED COVID-19 VIRUS INFECTION: Status: ACTIVE | Noted: 2020-08-25

## 2020-08-25 PROBLEM — A41.9 SEPSIS (HCC): Status: ACTIVE | Noted: 2020-08-25

## 2020-08-25 PROBLEM — F02.80 UNCOMPLICATED LATE ONSET ALZHEIMER'S DEMENTIA (HCC): Chronic | Status: ACTIVE | Noted: 2018-12-13

## 2020-08-25 LAB
A/G RATIO: 0.8 (ref 1.1–2.2)
ABO/RH: NORMAL
ALBUMIN SERPL-MCNC: 3.2 G/DL (ref 3.4–5)
ALP BLD-CCNC: 92 U/L (ref 40–129)
ALT SERPL-CCNC: 10 U/L (ref 10–40)
ANION GAP SERPL CALCULATED.3IONS-SCNC: 13 MMOL/L (ref 3–16)
ANTIBODY SCREEN: NORMAL
APTT: 32.2 SEC (ref 24.2–36.2)
AST SERPL-CCNC: 27 U/L (ref 15–37)
BILIRUB SERPL-MCNC: 0.4 MG/DL (ref 0–1)
BILIRUBIN URINE: NEGATIVE
BLOOD, URINE: NEGATIVE
BUN BLDV-MCNC: 18 MG/DL (ref 7–20)
CALCIUM SERPL-MCNC: 8.7 MG/DL (ref 8.3–10.6)
CHLORIDE BLD-SCNC: 101 MMOL/L (ref 99–110)
CLARITY: CLEAR
CO2: 19 MMOL/L (ref 21–32)
COLOR: YELLOW
CREAT SERPL-MCNC: 0.9 MG/DL (ref 0.6–1.2)
EKG ATRIAL RATE: 100 BPM
EKG ATRIAL RATE: 119 BPM
EKG DIAGNOSIS: NORMAL
EKG DIAGNOSIS: NORMAL
EKG P AXIS: 46 DEGREES
EKG P AXIS: 94 DEGREES
EKG P-R INTERVAL: 106 MS
EKG P-R INTERVAL: 118 MS
EKG Q-T INTERVAL: 322 MS
EKG Q-T INTERVAL: 374 MS
EKG QRS DURATION: 90 MS
EKG QRS DURATION: 92 MS
EKG QTC CALCULATION (BAZETT): 452 MS
EKG QTC CALCULATION (BAZETT): 482 MS
EKG R AXIS: -18 DEGREES
EKG R AXIS: -19 DEGREES
EKG T AXIS: 143 DEGREES
EKG T AXIS: 55 DEGREES
EKG VENTRICULAR RATE: 100 BPM
EKG VENTRICULAR RATE: 119 BPM
FERRITIN: 94.3 NG/ML (ref 15–150)
FIBRINOGEN: 585 MG/DL (ref 200–397)
GFR AFRICAN AMERICAN: >60
GFR NON-AFRICAN AMERICAN: 59
GLOBULIN: 4.2 G/DL
GLUCOSE BLD-MCNC: 123 MG/DL (ref 70–99)
GLUCOSE URINE: NEGATIVE MG/DL
INR BLD: 1.12 (ref 0.86–1.14)
KETONES, URINE: ABNORMAL MG/DL
LACTATE DEHYDROGENASE: 233 U/L (ref 100–190)
LEUKOCYTE ESTERASE, URINE: NEGATIVE
MICROSCOPIC EXAMINATION: ABNORMAL
NITRITE, URINE: NEGATIVE
PH UA: 5 (ref 5–8)
PHOSPHORUS: 2.7 MG/DL (ref 2.5–4.9)
POTASSIUM REFLEX MAGNESIUM: 3.6 MMOL/L (ref 3.5–5.1)
PROCALCITONIN: 0.49 NG/ML (ref 0–0.15)
PROTEIN UA: NEGATIVE MG/DL
PROTHROMBIN TIME: 13 SEC (ref 10–13.2)
SARS-COV-2, PCR: NOT DETECTED
SODIUM BLD-SCNC: 133 MMOL/L (ref 136–145)
SPECIFIC GRAVITY UA: 1.02 (ref 1–1.03)
TOTAL PROTEIN: 7.4 G/DL (ref 6.4–8.2)
TROPONIN: 0.37 NG/ML
URINE REFLEX TO CULTURE: ABNORMAL
URINE TYPE: ABNORMAL
UROBILINOGEN, URINE: 0.2 E.U./DL

## 2020-08-25 PROCEDURE — 6370000000 HC RX 637 (ALT 250 FOR IP): Performed by: INTERNAL MEDICINE

## 2020-08-25 PROCEDURE — 2580000003 HC RX 258: Performed by: INTERNAL MEDICINE

## 2020-08-25 PROCEDURE — 36415 COLL VENOUS BLD VENIPUNCTURE: CPT

## 2020-08-25 PROCEDURE — 84145 PROCALCITONIN (PCT): CPT

## 2020-08-25 PROCEDURE — 84484 ASSAY OF TROPONIN QUANT: CPT

## 2020-08-25 PROCEDURE — 86850 RBC ANTIBODY SCREEN: CPT

## 2020-08-25 PROCEDURE — 87449 NOS EACH ORGANISM AG IA: CPT

## 2020-08-25 PROCEDURE — 6360000002 HC RX W HCPCS: Performed by: EMERGENCY MEDICINE

## 2020-08-25 PROCEDURE — 80053 COMPREHEN METABOLIC PANEL: CPT

## 2020-08-25 PROCEDURE — 81003 URINALYSIS AUTO W/O SCOPE: CPT

## 2020-08-25 PROCEDURE — 94761 N-INVAS EAR/PLS OXIMETRY MLT: CPT

## 2020-08-25 PROCEDURE — 86901 BLOOD TYPING SEROLOGIC RH(D): CPT

## 2020-08-25 PROCEDURE — 82728 ASSAY OF FERRITIN: CPT

## 2020-08-25 PROCEDURE — 84100 ASSAY OF PHOSPHORUS: CPT

## 2020-08-25 PROCEDURE — 2500000003 HC RX 250 WO HCPCS: Performed by: INTERNAL MEDICINE

## 2020-08-25 PROCEDURE — 2580000003 HC RX 258: Performed by: EMERGENCY MEDICINE

## 2020-08-25 PROCEDURE — 96367 TX/PROPH/DG ADDL SEQ IV INF: CPT

## 2020-08-25 PROCEDURE — 85384 FIBRINOGEN ACTIVITY: CPT

## 2020-08-25 PROCEDURE — 6370000000 HC RX 637 (ALT 250 FOR IP): Performed by: EMERGENCY MEDICINE

## 2020-08-25 PROCEDURE — 51701 INSERT BLADDER CATHETER: CPT

## 2020-08-25 PROCEDURE — 86900 BLOOD TYPING SEROLOGIC ABO: CPT

## 2020-08-25 PROCEDURE — 83615 LACTATE (LD) (LDH) ENZYME: CPT

## 2020-08-25 PROCEDURE — 6360000002 HC RX W HCPCS: Performed by: INTERNAL MEDICINE

## 2020-08-25 PROCEDURE — 85730 THROMBOPLASTIN TIME PARTIAL: CPT

## 2020-08-25 PROCEDURE — 0100U HC RESPIRPTHGN MULT REV TRANS & AMP PRB TECH 21 TRGT: CPT

## 2020-08-25 PROCEDURE — 93005 ELECTROCARDIOGRAM TRACING: CPT | Performed by: EMERGENCY MEDICINE

## 2020-08-25 PROCEDURE — 85610 PROTHROMBIN TIME: CPT

## 2020-08-25 PROCEDURE — 1200000000 HC SEMI PRIVATE

## 2020-08-25 PROCEDURE — 93010 ELECTROCARDIOGRAM REPORT: CPT | Performed by: INTERNAL MEDICINE

## 2020-08-25 RX ORDER — SODIUM CHLORIDE 9 MG/ML
1000 INJECTION, SOLUTION INTRAVENOUS CONTINUOUS
Status: DISCONTINUED | OUTPATIENT
Start: 2020-08-25 | End: 2020-08-26

## 2020-08-25 RX ORDER — ASPIRIN 81 MG/1
81 TABLET, CHEWABLE ORAL DAILY
Status: DISCONTINUED | OUTPATIENT
Start: 2020-08-25 | End: 2020-08-26

## 2020-08-25 RX ORDER — SODIUM CHLORIDE 0.9 % (FLUSH) 0.9 %
10 SYRINGE (ML) INJECTION EVERY 12 HOURS SCHEDULED
Status: DISCONTINUED | OUTPATIENT
Start: 2020-08-25 | End: 2020-08-28 | Stop reason: HOSPADM

## 2020-08-25 RX ORDER — DEXAMETHASONE SODIUM PHOSPHATE 10 MG/ML
6 INJECTION, SOLUTION INTRAMUSCULAR; INTRAVENOUS DAILY
Status: DISCONTINUED | OUTPATIENT
Start: 2020-08-26 | End: 2020-08-25

## 2020-08-25 RX ORDER — ATORVASTATIN CALCIUM 10 MG/1
20 TABLET, FILM COATED ORAL DAILY
Status: DISCONTINUED | OUTPATIENT
Start: 2020-08-25 | End: 2020-08-28 | Stop reason: HOSPADM

## 2020-08-25 RX ORDER — ACETAMINOPHEN 325 MG/1
650 TABLET ORAL EVERY 6 HOURS PRN
Status: DISCONTINUED | OUTPATIENT
Start: 2020-08-25 | End: 2020-08-28 | Stop reason: HOSPADM

## 2020-08-25 RX ORDER — SODIUM CHLORIDE 9 MG/ML
1000 INJECTION, SOLUTION INTRAVENOUS CONTINUOUS
Status: DISCONTINUED | OUTPATIENT
Start: 2020-08-25 | End: 2020-08-25

## 2020-08-25 RX ORDER — ACETAMINOPHEN 650 MG/1
650 SUPPOSITORY RECTAL EVERY 6 HOURS PRN
Status: DISCONTINUED | OUTPATIENT
Start: 2020-08-25 | End: 2020-08-28 | Stop reason: HOSPADM

## 2020-08-25 RX ORDER — ASPIRIN 325 MG
325 TABLET ORAL ONCE
Status: COMPLETED | OUTPATIENT
Start: 2020-08-25 | End: 2020-08-25

## 2020-08-25 RX ORDER — SODIUM CHLORIDE 0.9 % (FLUSH) 0.9 %
10 SYRINGE (ML) INJECTION PRN
Status: DISCONTINUED | OUTPATIENT
Start: 2020-08-25 | End: 2020-08-28 | Stop reason: HOSPADM

## 2020-08-25 RX ORDER — DEXAMETHASONE SODIUM PHOSPHATE 10 MG/ML
6 INJECTION, SOLUTION INTRAMUSCULAR; INTRAVENOUS EVERY 8 HOURS
Status: DISCONTINUED | OUTPATIENT
Start: 2020-08-25 | End: 2020-08-25

## 2020-08-25 RX ORDER — ALBUTEROL SULFATE 90 UG/1
2 AEROSOL, METERED RESPIRATORY (INHALATION) EVERY 4 HOURS PRN
Status: DISCONTINUED | OUTPATIENT
Start: 2020-08-25 | End: 2020-08-28 | Stop reason: HOSPADM

## 2020-08-25 RX ADMIN — Medication 10 ML: at 08:44

## 2020-08-25 RX ADMIN — ASPIRIN 325 MG: 325 TABLET, FILM COATED ORAL at 01:43

## 2020-08-25 RX ADMIN — SODIUM CHLORIDE 1000 ML: 9 INJECTION, SOLUTION INTRAVENOUS at 00:42

## 2020-08-25 RX ADMIN — METOPROLOL TARTRATE 25 MG: 25 TABLET, FILM COATED ORAL at 21:54

## 2020-08-25 RX ADMIN — Medication 10 ML: at 21:54

## 2020-08-25 RX ADMIN — SODIUM CHLORIDE 1000 ML: 9 INJECTION, SOLUTION INTRAVENOUS at 11:43

## 2020-08-25 RX ADMIN — ATORVASTATIN CALCIUM 20 MG: 10 TABLET, FILM COATED ORAL at 08:43

## 2020-08-25 RX ADMIN — DEXAMETHASONE SODIUM PHOSPHATE 6 MG: 10 INJECTION INTRAMUSCULAR; INTRAVENOUS at 04:37

## 2020-08-25 RX ADMIN — METOPROLOL TARTRATE 25 MG: 25 TABLET, FILM COATED ORAL at 08:44

## 2020-08-25 RX ADMIN — ENOXAPARIN SODIUM 40 MG: 40 INJECTION SUBCUTANEOUS at 08:43

## 2020-08-25 RX ADMIN — VANCOMYCIN HYDROCHLORIDE 1000 MG: 1 INJECTION, POWDER, LYOPHILIZED, FOR SOLUTION INTRAVENOUS at 00:31

## 2020-08-25 RX ADMIN — PIPERACILLIN SODIUM AND TAZOBACTAM SODIUM 3.38 G: 3; .375 INJECTION, POWDER, LYOPHILIZED, FOR SOLUTION INTRAVENOUS at 04:36

## 2020-08-25 RX ADMIN — DOXYCYCLINE 100 MG: 100 INJECTION, POWDER, LYOPHILIZED, FOR SOLUTION INTRAVENOUS at 11:43

## 2020-08-25 RX ADMIN — DOXYCYCLINE 100 MG: 100 INJECTION, POWDER, LYOPHILIZED, FOR SOLUTION INTRAVENOUS at 22:08

## 2020-08-25 RX ADMIN — ASPIRIN 81 MG CHEWABLE TABLET 81 MG: 81 TABLET CHEWABLE at 08:43

## 2020-08-25 SDOH — ECONOMIC STABILITY: TRANSPORTATION INSECURITY
IN THE PAST 12 MONTHS, HAS LACK OF TRANSPORTATION KEPT YOU FROM MEETINGS, WORK, OR FROM GETTING THINGS NEEDED FOR DAILY LIVING?: NO

## 2020-08-25 SDOH — SOCIAL STABILITY: SOCIAL INSECURITY
WITHIN THE LAST YEAR, HAVE YOU BEEN KICKED, HIT, SLAPPED, OR OTHERWISE PHYSICALLY HURT BY YOUR PARTNER OR EX-PARTNER?: NO

## 2020-08-25 SDOH — HEALTH STABILITY: MENTAL HEALTH
STRESS IS WHEN SOMEONE FEELS TENSE, NERVOUS, ANXIOUS, OR CAN'T SLEEP AT NIGHT BECAUSE THEIR MIND IS TROUBLED. HOW STRESSED ARE YOU?: NOT AT ALL

## 2020-08-25 SDOH — ECONOMIC STABILITY: FOOD INSECURITY: WITHIN THE PAST 12 MONTHS, THE FOOD YOU BOUGHT JUST DIDN'T LAST AND YOU DIDN'T HAVE MONEY TO GET MORE.: NEVER TRUE

## 2020-08-25 SDOH — SOCIAL STABILITY: SOCIAL INSECURITY: WITHIN THE LAST YEAR, HAVE YOU BEEN AFRAID OF YOUR PARTNER OR EX-PARTNER?: NO

## 2020-08-25 SDOH — SOCIAL STABILITY: SOCIAL NETWORK: ARE YOU MARRIED, WIDOWED, DIVORCED, SEPARATED, NEVER MARRIED, OR LIVING WITH A PARTNER?: WIDOWED

## 2020-08-25 SDOH — SOCIAL STABILITY: SOCIAL NETWORK: HOW OFTEN DO YOU ATTENT MEETINGS OF THE CLUB OR ORGANIZATION YOU BELONG TO?: NEVER

## 2020-08-25 SDOH — SOCIAL STABILITY: SOCIAL INSECURITY
WITHIN THE LAST YEAR, HAVE TO BEEN RAPED OR FORCED TO HAVE ANY KIND OF SEXUAL ACTIVITY BY YOUR PARTNER OR EX-PARTNER?: NO

## 2020-08-25 SDOH — SOCIAL STABILITY: SOCIAL NETWORK
DO YOU BELONG TO ANY CLUBS OR ORGANIZATIONS SUCH AS CHURCH GROUPS UNIONS, FRATERNAL OR ATHLETIC GROUPS, OR SCHOOL GROUPS?: YES

## 2020-08-25 SDOH — SOCIAL STABILITY: SOCIAL INSECURITY: WITHIN THE LAST YEAR, HAVE YOU BEEN HUMILIATED OR EMOTIONALLY ABUSED IN OTHER WAYS BY YOUR PARTNER OR EX-PARTNER?: NO

## 2020-08-25 SDOH — HEALTH STABILITY: PHYSICAL HEALTH: ON AVERAGE, HOW MANY MINUTES DO YOU ENGAGE IN EXERCISE AT THIS LEVEL?: 0 MIN

## 2020-08-25 SDOH — HEALTH STABILITY: PHYSICAL HEALTH: ON AVERAGE, HOW MANY DAYS PER WEEK DO YOU ENGAGE IN MODERATE TO STRENUOUS EXERCISE (LIKE A BRISK WALK)?: 0 DAYS

## 2020-08-25 SDOH — ECONOMIC STABILITY: FOOD INSECURITY: WITHIN THE PAST 12 MONTHS, YOU WORRIED THAT YOUR FOOD WOULD RUN OUT BEFORE YOU GOT MONEY TO BUY MORE.: NEVER TRUE

## 2020-08-25 SDOH — ECONOMIC STABILITY: TRANSPORTATION INSECURITY
IN THE PAST 12 MONTHS, HAS THE LACK OF TRANSPORTATION KEPT YOU FROM MEDICAL APPOINTMENTS OR FROM GETTING MEDICATIONS?: NO

## 2020-08-25 SDOH — SOCIAL STABILITY: SOCIAL NETWORK: HOW OFTEN DO YOU ATTEND CHURCH OR RELIGIOUS SERVICES?: 1 TO 4 TIMES PER YEAR

## 2020-08-25 SDOH — SOCIAL STABILITY: SOCIAL NETWORK: HOW OFTEN DO YOU GET TOGETHER WITH FRIENDS OR RELATIVES?: MORE THAN THREE TIMES A WEEK

## 2020-08-25 ASSESSMENT — PAIN SCALES - GENERAL
PAINLEVEL_OUTOF10: 0
PAINLEVEL_OUTOF10: 0

## 2020-08-25 NOTE — PROGRESS NOTES
4 Eyes Skin Assessment     The patient is being assess for   Shift Handoff  Given to Yesica Henry re:  Respiratory management; telemetry and elevated troponin levels. I agree that 2 RN's have performed a thorough Head to Toe Skin Assessment on the patient. ALL assessment sites listed below have been assessed. Areas assessed by both nurses:   [x]   Head, Face, and Ears   [x]   Shoulders, Back, and Chest, Abdomen  [x]   Arms, Elbows, and Hands   [x]   Coccyx, Sacrum, and Ischium  []   Legs, Feet, and Heels          **SHARE this note so that the co-signing nurse is able to place an eSignature**    Co-signer eSignature: {Esignature:[id number]}    Does the Patient have Skin Breakdown?   No          Casey Prevention initiated:  Yes   Wound Care Orders initiated:  No      WOC nurse consulted for Pressure Injury (Stage 3,4, Unstageable, DTI, NWPT, Complex wounds)and New or Established Ostomies:  No      Primary Nurse eSignature: Electronically signed by Riya Enriquez RN on 8/25/20 at 7:42 AM EDT

## 2020-08-25 NOTE — CARE COORDINATION
Case Management Assessment  Initial Evaluation      Patient Name: Eloina Ricardo  YOB: 1931  Diagnosis: Sepsis [facility]) [A41.9]  Date / Time: 8/24/2020  9:25 PM    Admission status/Date:inpt  Chart Reviewed: Yes      Patient Interviewed: no  Family Interviewed:  Yes - pt's sonMyriam POA      Hospitalization in the last 30 days:  No      Who do you trust or have selected to make healthcare decisions for you? Name:   Fifi Osei  Phone  Number: [phone]  Can this person be reached and be able to respond quickly, such as within a few minutes or hours? Yes      Spoke with: pt's son, Lonnie Aguila conducted  (bedside/phone):    Current PCP:   Carl Medina MD      Financial  Commercial  Precert required for SNF : Y, N          3 night stay required - Y, N    ADLS  Support Systems/Care Needs:    Transportation: family    Meal Preparation: family    Housing  Living Arrangements: home with son and his family  Steps: two  Intent for return to present living arrangements: Yes  Identified Issues: no    Home Care Information  Active with [address] : Yes - private pay a Agency:(Services)     Passport/Waiver : No  :                      Phone Number:    Passport/Waiver Services: no          Durable Medical Equiptment   DME Provider: na  Equipment:   Walker_x__Cane_x__RTS___ BSC___Shower Chair___Hospital Bed___W/C____Other________  02 at ____Liter(s)---wears(frequency)_______ HHN ___ CPAP___ BiPap___   N/A____      Home O2 Use :  No    If No for home O2---if presently on O2 during hospitalization:  No  if yes CM to follow for potential DC O2 need  Informed of need for care provider to bring portable home O2 tank on day of discharge for nursing to connect prior to leaving:   Not Indicated  Verbalized agreement/Understanding:   Not Indicated    Community Service Affiliation  Dialysis:  No    · Agency:  · Location:  · Dialysis Schedule:  · Phone:   · Fax:     Other Community Services: (ex:PT/OT,Mental Health,Wound Clinic, Cardio/Pul [address])    DISCHARGE PLAN: Explained Case Management role/services. Reviewed chart. Spoke with pt's son, Priscilla Jackson, for initial interview. Pt from home with Priscilla Jackson and his family and plan return. Pt has private pay hha during the day 5 days/wk. C-19 pending. Following.

## 2020-08-25 NOTE — PROGRESS NOTES
RN to room to assist patient with bedpan. Patient confused at times, but redirectable. Call light within reach. No other needs identified.

## 2020-08-25 NOTE — ED NOTES
Bed: 01  Expected date:   Expected time:   Means of arrival:   Comments:  Obdulio Cardenas  08/24/20 8541

## 2020-08-25 NOTE — PROGRESS NOTES
4 Eyes Skin Assessment     The patient is being assess for   Admission  To ICU with a diagnosis of Sepsis and Elevated Troponin. I agree that 2 RN's have performed a thorough Head to Toe Skin Assessment on the patient. ALL assessment sites listed below have been assessed. Areas assessed by both nurses:   [x]   Head, Face, and Ears   [x]   Shoulders, Back, and Chest, Abdomen  [x]   Arms, Elbows, and Hands   [x]   Coccyx, Sacrum, and Ischium  [x]   Legs, Feet, and Heels          **SHARE this note so that the co-signing nurse is able to place an eSignature**    Co-signer eSignature: Electronically signed by Belinda Tong RN on 8/25/20 at 7:20 AM EDT    Does the Patient have Skin Breakdown? No          Casey Prevention initiated:  Yes   Wound Care Orders initiated:  No      [facility] nurse consulted for Pressure Injury (Stage 3,4, Unstageable, DTI, NWPT, Complex wounds)and New or Established Ostomies:  No      Primary Nurse eSignature: Electronically signed by Juliano Neves RN on 8/25/20 at 3:57 AM EDT  Patient is not able to demonstrate the ability to move from a reclining position to an upright position within the recliner due to increased SOB with exertion and Bedrest due elevated Troponin level. Collette Ruts

## 2020-08-25 NOTE — PROGRESS NOTES
Transfer to 2W from ICU. Obed Riggs and Dorian Matos performed complete skin assessment on transfer. Adry Juan Upon transferring patient to ordered level of care, patients medications were gathered from the following locations and given to receiving nurse during bedside report:      Lock Box in room :     [x] Yes   [] No   Pyxis Bin:       [] Yes   [] No      Pyxis Refrigerator:      [x] Yes   [] No   Tube System:       [x] Yes   [] No   LDA's documented:  [x] Yes   [] No          The following paperwork was transferred with patient:    Blue medication book:       [] Yes   [] No      12 hour chart check:       [x] Yes   [] No   Patient belongings:       [x] Yes   [] No      Continuous pulse ox:   [x] Yes   [] No      [] N/A      Tele monitor number  assigned to patient and placed on patient prior to transfer.     CMU Notified at Transfer: [] Yes   [] No     Name of [address] [place] [address] Staff:  ____________________________       MD notified via Perfect Serve:   [] Yes   [] No    Family notified of transfer:    [x] Yes   [] No    Spoke with:  _____________ ________________

## 2020-08-25 NOTE — PROGRESS NOTES
4 Eyes Skin Assessment     The patient is being assess for   Shift Handoff    I agree that 2 RN's have performed a thorough Head to Toe Skin Assessment on the patient. ALL assessment sites listed below have been assessed. Areas assessed by both nurses:   [x]   Head, Face, and Ears   [x]   Shoulders, Back, and Chest, Abdomen  [x]   Arms, Elbows, and Hands   [x]   Coccyx, Sacrum, and Ischium  [x]   Legs, Feet, and Heels        *  **SHARE this note so that the co-signing nurse is able to place an eSignature**    Co-signer eSignature: {Esignature:[id number]}    Does the Patient have Skin Breakdown?   No          Casey Prevention initiated:  Yes   Wound Care Orders initiated:  No      WOC nurse consulted for Pressure Injury (Stage 3,4, Unstageable, DTI, NWPT, Complex wounds)and New or Established Ostomies:  No      Primary Nurse eSignature: Electronically signed by Lizz Call RN on 8/25/20 at 7:16 AM EDT

## 2020-08-25 NOTE — ED PROVIDER NOTES
[address] ED      CHIEF COMPLAINT  Shortness of Breath (acute dyspnea 1 hour PTA. )       HISTORY OF PRESENT ILLNESS  Jennifer Melendrez is a 80 y.o. female with a past medical history of CAD, CHF, and dementia who presents to the ED complaining of shortness of breath. Patient is here with her son who helps with the history. The patient states that the shortness of breath started earlier. She denies any cough or fever. She denies chest pain. Her son states that there was sitting on the deck when all of a sudden the patient started having shortness of breath. Again she denies any chest pain. She has not had a cough recently. She denies vomiting or diarrhea. She denies a history of VTE, recent leg edema, surgeries, hospitalizations, or hemoptysis. She does not have cancer. She has had stents placed in the past.  She is not on blood thinners. No other complaints, modifying factors or associated symptoms. I have reviewed the following from the nursing documentation. Past Medical History:   Diagnosis Date    Acute blood loss anemia 1/13/2012    Atrial fib/flutter, transient     Bacteremia 09/08/2018    E coli    CAD (coronary artery disease)     Cardiogenic shock ([address] 1/6/2012    [facility]estive [facility]     Chronic diastolic CHF (congestive heart failure) ([address] 11/26/2014    Encephalopathy, metabolic 2/24/7304    WNL EEG and CT HEAD.      History of blood transfusion     Hypertension     MI, old 01/12    Mitral regurgitation 1/8/2012    No history of procedure 10/03/2016    no history of colonoscopy    NSTEMI (non-ST elevated myocardial infarction) ([address] 1/7/2012    Old cerebral hemorrhage without late effect 2/7/2017    Pneumonia 2/6/2013    Respiratory failure, acute ([address] 3/6/4045    Uncomplicated late onset Alzheimer's dementia ([address] 12/13/2018    Unspecified cerebral artery occlusion with cerebral infarction      Past Surgical History:   Procedure Laterality Date    CARDIAC SURGERY      CORONARY ARTERY BYPASS GRAFT  1/12/12    DILATION AND CURETTAGE OF UTERUS      MITRAL VALVE REPLACEMENT  1/12/12    porcine valve    MOUTH SURGERY      TRACHEOSTOMY TUBE PLACEMENT      UPPER GASTROINTESTINAL ENDOSCOPY  10/03/2016     Family History   Problem Relation Age of Onset    Heart Disease Mother     High Blood Pressure Mother      Social History     Socioeconomic History    Marital status:       Spouse name: Not on file    Number of children: Not on file    Years of education: Not on file    Highest education level: Not on file   Occupational History    Not on file   Social Needs    Financial resource strain: Not on file    Food insecurity     Worry: Not on file     Inability: Not on file    Transportation needs     Medical: Not on file     Non-medical: Not on file   Tobacco Use    Smoking status: Passive Smoke Exposure - Never Smoker    Smokeless tobacco: Never Used   Substance and Sexual Activity    Alcohol use: No    Drug use: No    Sexual activity: Never     Partners: Male   Lifestyle    Physical activity     Days per week: Not on file     Minutes per session: Not on file    Stress: Not on file   Relationships    Social connections     Talks on phone: Not on file     Gets together: Not on file     Attends Zoroastrian service: Not on file     Active member of club or organization: Not on file     Attends meetings of clubs or organizations: Not on file     Relationship status: Not on file    Intimate partner violence     Fear of current or ex partner: Not on file     Emotionally abused: Not on file     Physically abused: Not on file     Forced sexual activity: Not on file   Other Topics Concern    Not on file   Social History Narrative    Not on file     Current Facility-Administered Medications   Medication Dose Route Frequency Provider Last Rate Last Dose    0.9 % sodium chloride infusion  1,000 mL Intravenous Continuous Susan Prasnal,  mL/hr at 08/25/20 0042 1,000 mL at 08/25/20 0042    sodium chloride flush 0.9 % injection 10 mL  10 mL Intravenous 2 times per day Charles Pulido MD        sodium chloride flush 0.9 % injection 10 mL  10 mL Intravenous PRN Charles Pulido MD        acetaminophen (TYLENOL) tablet 650 mg  650 mg Oral Q6H PRN Charles Pulido MD        Or    acetaminophen (TYLENOL) suppository 650 mg  650 mg Rectal Q6H PRN Charles Pulido MD        enoxaparin (LOVENOX) injection 40 mg  40 mg Subcutaneous Daily Charles Pulido MD        dexamethasone (PF) (DECADRON) injection 6 mg  6 mg Intravenous Q8H Charles Pulido MD        piperacillin-tazobactam (ZOSYN) 3.375 g in sodium chloride 0.9 % 100 mL IVPB extended infusion (mini-bag)  3.375 g Intravenous Q8H Charles Pulido MD        vancomycin 1000 mg IVPB in 250 mL D5W addavial  15 mg/kg Intravenous Q12H Charles Pulido MD        enoxaparin (LOVENOX) injection 30 mg  30 mg Subcutaneous BID Charles Pulido MD         Current Outpatient Medications   Medication Sig Dispense Refill    carvedilol (COREG) 6.25 MG tablet TAKE ONE TABLET BY MOUTH TWICE A DAY WITH MEALS 180 tablet 0    pantoprazole (PROTONIX) 40 MG tablet TAKE ONE TABLET BY MOUTH DAILY 90 tablet 0    escitalopram (LEXAPRO) 10 MG tablet Take 1 tablet by mouth daily 30 tablet 2    atorvastatin (LIPITOR) 20 MG tablet TAKE ONE TABLET BY MOUTH DAILY 90 tablet 0    furosemide (LASIX) 40 MG tablet TAKE half TABLET BY MOUTH DAILY 90 tablet 0    aspirin 81 MG tablet Take 81 mg by mouth daily.  fexofenadine (ALLEGRA) 180 MG tablet Take 180 mg by mouth daily as needed. Allergies   Allergen Reactions    Morphine Shortness Of Breath    Warfarin Other (See Comments)    Cefuroxime     Levofloxacin [Levofloxacin]      rash    Azithromycin Nausea And Vomiting     States had N/V one hour past oral doses of this med.        REVIEW OF SYSTEMS  10 systems reviewed, pertinent positives per HPI otherwise noted to be negative. PHYSICAL EXAM  /69   Pulse 92   Temp 98.4 °F (36.9 °C) (Oral)   Resp 26   Ht 5' 2\" (1.575 m)   Wt 145 lb (65.8 kg)   SpO2 94%   BMI 26.52 kg/m²    Physical exam:  General appearance: awake and cooperative. No distress. Ill appearing. Skin: Warm and dry. No rashes or lesions. HENT: Normocephalic. Atraumatic. Mucus membranes are dry. Neck: supple  Eyes: TENZIN. EOM intact. Heart: tachycardic rate. Regular rhythm. No murmurs. Lungs: mildly tachypneic, without conversational dyspnea or accessory muscle use. CTAB. Crackles in left posterior lung base, no wheezes or rhonchi. Good air exchange  Abdomen: No tenderness. Soft. Non distended. No peritoneal signs. Musculoskeletal: No extremity edema. Compartments soft. No deformity. No calf tenderness or palpable cords. All extremities neurovascularly intact. Radial, Dp, and PT pulses +2/4 bilaterally  Neurological: Alert and oriented. No focal deficits. No aphasia or dysarthria. Psychiatric: Normal mood and affect. LABS  I have reviewed all labs for this visit.    Results for orders placed or performed during the hospital encounter of 08/24/20   Blood gas, venous   Result Value Ref Range    pH, Mo 7.389 7.350 - 7.450    pCO2, Mo 45.2 40.0 - 50.0 mmHg    pO2, Mo 66.9 (H) 25.0 - 40.0 mmHg    HCO3, Venous 26.7 23.0 - 29.0 mmol/L    Base Excess, Mo 1.4 -3.0 - 3.0 mmol/L    O2 Sat, Mo 93 Not Established %    Carboxyhemoglobin 1.1 0.0 - 1.5 %    MetHgb, Mo 0.1 <1.5 %    TC02 (Calc), Mo 28 Not Established mmol/L    O2 Content, Mo 15 Not Established VOL %    O2 Therapy Unknown    CBC auto differential   Result Value Ref Range    WBC 18.3 (H) 4.0 - 11.0 K/uL    RBC 3.78 (L) 4.00 - 5.20 M/uL    Hemoglobin 10.4 (L) 12.0 - 16.0 g/dL    Hematocrit 32.9 (L) 36.0 - 48.0 %    MCV 87.1 80.0 - 100.0 fL    MCH 27.6 26.0 - 34.0 pg    MCHC 31.7 31.0 - 36.0 g/dL    RDW 16.1 (H) 12.4 - 15.4 %    Platelets 718 135 - 450 K/uL    MPV 7.5 5.0 - 10.5 fL    Neutrophils % 84.9 %    Lymphocytes % 4.3 %    Monocytes % 5.1 %    Eosinophils % 4.7 %    Basophils % 1.0 %    Neutrophils Absolute 15.5 (H) 1.7 - 7.7 K/uL    Lymphocytes Absolute 0.8 (L) 1.0 - 5.1 K/uL    Monocytes Absolute 0.9 0.0 - 1.3 K/uL    Eosinophils Absolute 0.9 (H) 0.0 - 0.6 K/uL    Basophils Absolute 0.2 0.0 - 0.2 K/uL   Comprehensive Metabolic Panel w/ Reflex to MG   Result Value Ref Range    Sodium 139 136 - 145 mmol/L    Potassium reflex Magnesium 3.9 3.5 - 5.1 mmol/L    Chloride 102 99 - 110 mmol/L    CO2 27 21 - 32 mmol/L    Anion Gap 10 3 - 16    Glucose 152 (H) 70 - 99 mg/dL    BUN 17 7 - 20 mg/dL    CREATININE 1.0 0.6 - 1.2 mg/dL    GFR Non-African American 52 (A) >60    GFR African American >60 >60    Calcium 9.0 8.3 - 10.6 mg/dL    Total Protein 7.7 6.4 - 8.2 g/dL    Alb 3.9 3.4 - 5.0 g/dL    Albumin/Globulin Ratio 1.0 (L) 1.1 - 2.2    Total Bilirubin 0.4 0.0 - 1.0 mg/dL    Alkaline Phosphatase 105 40 - 129 U/L    ALT 10 10 - 40 U/L    AST 21 15 - 37 U/L    Globulin 3.8 g/dL   Brain Natriuretic Peptide   Result Value Ref Range    Pro-BNP 2,747 (H) 0 - 449 pg/mL   Lactic acid, plasma   Result Value Ref Range    Lactic Acid 2.8 (H) 0.4 - 2.0 mmol/L   D-dimer, quantitative   Result Value Ref Range    D-Dimer, Quant 876 (H) 0 - 229 ng/mL DDU   Troponin   Result Value Ref Range    Troponin 0.11 (H) <0.01 ng/mL   Protime-INR   Result Value Ref Range    Protime 11.8 10.0 - 13.2 sec    INR 1.02 0.86 - 1.14   EKG 12 Lead   Result Value Ref Range    Ventricular Rate 119 BPM    Atrial Rate 119 BPM    P-R Interval 106 ms    QRS Duration 90 ms    Q-T Interval 322 ms    QTc Calculation (Bazett) 452 ms    P Axis 94 degrees    R Axis -18 degrees    T Axis 143 degrees    Diagnosis       Sinus tachycardia with short PRRSR' or QR pattern in V1 suggests right ventricular conduction delayPossible Inferior infarct (cited on or before 24-AUG-2020)Anterolateral infarct (cited on or before 24-AUG-2020)Abnormal ECGWhen compared with ECG of 24-AUG-2020 21:30, (unconfirmed)Premature ventricular complexes are no longer Present       ECG  The Ekg interpreted by me shows  sinus tachycardia, [id number]   Axis is   Left axis deviation  QTc is  within an acceptable range  Intervals and Durations are unremarkable. ST Segments: no acute change  No significant change from prior EKG dated 1/17/19    RADIOLOGY  Xr Chest Portable    Result Date: 8/24/2020  EXAMINATION: ONE XRAY VIEW OF THE CHEST 8/24/2020 10:03 pm COMPARISON: Chest radiograph performed 01/17/2019. HISTORY: ORDERING SYSTEM PROVIDED HISTORY: short of breath TECHNOLOGIST PROVIDED HISTORY: Reason for exam:->short of breath Reason for Exam: Shortness of Breath (acute dyspnea 1 hour PTA. ) Acuity: Acute Type of Exam: Initial FINDINGS: There is chronic pulmonary change. There are bibasilar effusions. There are adjacent bibasilar infiltrates. There is underlying congestion. There is no pneumothorax. The heart is enlarged. The upper abdomen is unremarkable. The extrathoracic soft tissues are unremarkable. Cardiomegaly and underlying congestion with small bibasilar effusions and adjacent infiltrates representing atelectasis versus pneumonia. ED COURSE/MDM  Patient seen and evaluated. Old records reviewed. Labs and imaging reviewed and results discussed with patient. The patient is an 80-year-old female presenting with shortness of breath. On arrival the patient is tachycardic and tachypneic. Oxygen saturation is 92% on room air. She is afebrile. She is initially hypertensive and this does improve spontaneously. On exam the patient is ill-appearing, mildly tachypneic without conversational dyspnea or respiratory distress. She does have a focal lung exam, with associated SIRS vital signs and concern for pneumonia as a source of infection. She also has a lactic acidosis of 2.8 and has sepsis.   Patient is started on Unasyn as she is tolerated this in the past, she does have multiple allergies. She is also started on vancomycin. She is 92% on room air, not necessarily requiring oxygen but is tachypneic. Her venous blood gas is normal.  She does have an elevated d-dimer of 876, I initially ordered this with the thought that she could have a PE however I do feel that her tachypnea and relative hypoxia are more related to pneumonia. She also has superimposed pulmonary edema. This is why I went slow on fluids, patient was ordered 125/h I did not do 30 cc/kg. Her heart rate did improve to 92 throughout her stay here. Her respiratory status continued to be stable. She does have a troponin of 0.11, this is increased from prior. I did perform EKG, 2 of them, and neither were suspicious for acute ischemia. Again the patient is chest pain-free. I did consult cardiology and spoke with Luna Mera He agrees that he does not see any ischemia on EKG and we can hold off on heparin. Patient is given aspirin. I spoke with the patient and son regarding plan of care. They are agreeable. Patient will be admitted for further management and spoke with Dr. Jocelynn Isidro. The total Critical Care time is 37 minutes which excludes separately billable procedures. SEP-1 CORE MEASURE DATA    Classification: sepsis    Amount of fluids ordered: less than 30mL/kg because of concomitant acute pulmonary edema and patient made an informed decision to decline more aggressive fluid resuscitation    Time at which sepsis was identified: 2237    Broad-spectrum antibiotics chosen:  based on suspected source of: Pulmonary - Community Acquired    Repeat lactate level:      On reassessment after fluid resuscitation: HR 92      During the patient's ED course, the patient was given:  Medications   0.9 % sodium chloride infusion (1,000 mLs Intravenous New Bag 8/25/20 0042)   sodium chloride flush 0.9 % injection 10 mL (has no administration in time

## 2020-08-25 NOTE — PROGRESS NOTES
Assessment complete. Vital signs stable on room air. Call light within reach. Allergy bands, ID band, and tele intact. Medications given per MAR. Patient assisted with bedpan and repositioning for tray set up for breakfast. Denies any other needs at this time. Will continue to monitor.

## 2020-08-25 NOTE — PROGRESS NOTES
Level of Consciousness: Alert, Oriented, and Cooperative = 0    Level of Activity: Mostly sedentary, minimal walking = 2    Respiratory Pattern: Dyspnea with exertion;Irregular pattern;or RR less than 6 = 2    Breath Sounds: Diminshed bilaterally and/or crackles = 2    Sputum   ,  ,    Cough: Strong, spontaneous, non-productive = 0    Vital Signs   BP (!) 141/81   Pulse 96   Temp 97.1 °F (36.2 °C) (Axillary)   Resp (!) 31   Ht 5' 2\" (1.575 m)   Wt 139 lb 1.6 oz (63.1 kg)   SpO2 98%   BMI 25.44 kg/m²   SPO2 (COPD values may differ): Greater than or equal to 92% on room air = 0    Peak Flow (asthma only): not applicable = 0    RSI: 5-6 = Q4hr PRN (every four hours as needed) for dyspnea        Plan       Goals: medication delivery prn    Patient/caregiver was educated on the proper method of use for Respiratory Care Devices:  No:       Level of patient/caregiver understanding able to:   ? Verbalize understanding   ? Demonstrate understanding       ? Teach back        ? Needs reinforcement       ? No available caregiver               ? Other:     Response to education:  NA     Is patient being placed on Home Treatment Regimen? No     Does the patient have everything they need prior to discharge? NA     Comments: chart reviewed, pt assessed    Plan of Care: albuterol mdi Q4prn    Electronically signed by Yancy Shepherd RCP on 8/25/2020 at 1:30 PM    Respiratory Protocol Guidelines     1. Assessment and treatment by Respiratory Therapy will be initiated for medication and therapeutic interventions upon initiation of aerosolized medication. 2. Physician will be contacted for respiratory rate (RR) greater than 35 breaths per minute. Therapy will be held for heart rate (HR) greater than 140 beats per minute, pending direction from physician. 3. Bronchodilators will be administered via Metered Dose Inhaler (MDI) with spacer when the following criteria are met:  a.  Alert and cooperative     b. HR < 140 bpm  c. RR < 30 bpm                d. Can demonstrate a 2-3 second inspiratory hold  4. Bronchodilators will be administered via Hand Held Nebulizer [facility]) to patients when ANY of the following criteria are met  a. Incognizant or uncooperative          b. Patients treated with HHN at Home        c. Unable to demonstrate proper use of MDI with spacer     d. RR > 30 bpm   5. Bronchodilators will be delivered via Metered Dose Inhaler (MDI), HHN, Aerogen to intubated patients on mechanical ventilation. 6. Inhalation medication orders will be delivered and/or substituted as outlined below. Aerosolized Medications Ordering and Administration Guidelines:    1. All Medications will be ordered by a physician, and their frequency and/or modality will be adjusted as defined by the patients Respiratory Severity Index (RSI) score. 2. If the patient does not have documented COPD, consider discontinuing anticholinergics when RSI is less than 9.  3. If the bronchospasm worsens (increased RSI), then the bronchodilator frequency can be increased to a maximum of every 4 hours. If greater than every 4 hours is required, the physician will be contacted. 4. If the bronchospasm improves, the frequency of the bronchodilator can be decreased, based on the patient's RSI, but not less than home treatment regimen frequency. 5. Bronchodilator(s) will be discontinued if patient has a RSI less than 9 and has received no scheduled or as needed treatment for 72  Hrs. Patients Ordered on a Mucolytic Agent:    1. Must always be administered with a bronchodilator. 2. Discontinue if patient experiences worsened bronchospasm, or secretions have lessened to the point that the patient is able to clear them with a cough. Anti-inflammatory and Combination Medications:    1.  If the patient lacks prior history of lung disease, is not using inhaled anti-inflammatory medication at home, and lacks wheezing by examination or by history for at

## 2020-08-25 NOTE — PROGRESS NOTES
Patient alert and awake. RN assisted patient with cutting up meat and setting up meal tray. IVF infusing. Denies any needs at this time. Patient son called, RN updated him on patient current status and plan of care for today.  Will continue to monitor

## 2020-08-25 NOTE — H&P
History and Physical    Admit Date: 8/24/2020    Patient's PCP: Dr. Leah Elias MD    Chief complaint:   Chief Complaint   Patient presents with    Shortness of Breath     acute dyspnea 1 hour PTA. HISTORY OF PRESENT ILLNESS:    This is a 80 y.o. female presenting with shortness of breath, 1 hour, sudden onset, severe and breathless at rest, no relievers, associated with productive cough and clear sputum. Review of system is positive for chronic orthopnea and nocturia. She denies fever, chills, rigors, night sweats, stuffy nose, runny nose, sore throat, loss of appetite, hemoptysis, chest pain, loss of consciousness, leg swelling, leg pain, vomiting, diarrhea. Past Medical / Surgical History:    Past Medical History:   Diagnosis Date    Acute blood loss anemia 1/13/2012    Atrial fib/flutter, transient     Bacteremia 09/08/2018    E coli    CAD (coronary artery disease)     Cardiogenic shock ([address] 1/6/2012    CH[facility]     Chronic diastolic CHF (congestive heart failure) ([address] 11/26/2014    Encephalopathy, metabolic 9/60/2359    WNL EEG and CT HEAD.      History of blood transfusion     Hypertension     MI, old 01/12    Mitral regurgitation 1/8/2012    No history of procedure 10/03/2016    no history of colonoscopy    NSTEMI (non-ST elevated myocardial infarction) ([address] 1/7/2012    Old cerebral hemorrhage without late effect 2/7/2017    Pneumonia 2/6/2013    Respiratory failure, acute ([address] 4/2/0098    Uncomplicated late onset Alzheimer's dementia ([facility] 12/13/2018    Unspecified cerebral artery occlusion with cerebral infarction        Past Surgical History:   Procedure Laterality Date    CARDIAC SURGERY      CORONARY ARTERY BYPASS GRAFT  1/12/12    DILATION AND CURETTAGE OF UTERUS      ENDOSCOPY, COLON, DIAGNOSTIC      JOINT REPLACEMENT      MITRAL VALVE REPLACEMENT  1/12/12    porcine valve    MOUTH SURGERY      TRACHEOSTOMY TUBE PLACEMENT      UPPER GASTROINTESTINAL ENDOSCOPY  10/03/2016       Medications Prior to Admission:    No current facility-administered medications on file prior to encounter. Current Outpatient Medications on File Prior to Encounter   Medication Sig Dispense Refill    carvedilol (COREG) 6.25 MG tablet TAKE ONE TABLET BY MOUTH TWICE A DAY WITH MEALS 180 tablet 0    pantoprazole (PROTONIX) 40 MG tablet TAKE ONE TABLET BY MOUTH DAILY 90 tablet 0    atorvastatin (LIPITOR) 20 MG tablet TAKE ONE TABLET BY MOUTH DAILY 90 tablet 0    furosemide (LASIX) 40 MG tablet TAKE half TABLET BY MOUTH DAILY 90 tablet 0    aspirin 81 MG tablet Take 81 mg by mouth daily.  fexofenadine (ALLEGRA) 180 MG tablet Take 180 mg by mouth daily as needed.  escitalopram (LEXAPRO) 10 MG tablet Take 1 tablet by mouth daily 30 tablet 2       Allergies:  Morphine; Warfarin; Cefuroxime; Levofloxacin [levofloxacin]; and Azithromycin    Social History:   TOBACCO:   reports that she is a non-smoker but has been exposed to tobacco smoke. She has never used smokeless tobacco.     ETOH:   reports no history of alcohol use. Family History:       Problem Relation Age of Onset    Heart Disease Mother     High Blood Pressure Mother        ROS: As stated in the HPI and the 12 point review of system is otherwise negative    PHYSICAL EXAM:  /65   Pulse 88   Temp 98 °F (36.7 °C) (Oral)   Resp 27   Ht 5' 2\" (1.575 m)   Wt 139 lb 1.6 oz (63.1 kg)   SpO2 97%   BMI 25.44 kg/m²     No results for input(s): POCGLU in the last 72 hours.     General appearance: alert and interactive, mild respiratory distress, normal body habitus  Eyes: Lids and conjunctiva normal, sclera anicteric, cornea clear, pupils equal and reactive to light,  Throat: Oral mucosa pink and moist, oropharynx patent and clear,  Neck: no JVD, trachea central, no goiter,  Lungs: Breath sounds symmetrically equal, vesicular, expiratory wheezes and crackles are heard Sepsis ([facility] [A41.9] 08/25/2020     Priority: High    Flash pulmonary edema ([facility] [J81.0] 08/25/2020     Priority: High    Pneumonia due to suspected gram-negative bacteria [J18.9] 09/08/2018     Priority: High    Suspected COVID-19 virus infection [Z20.828] 67/46/4733    Uncomplicated late onset Alzheimer's dementia ([facility] [G30.1, F02.80] 12/13/2018    Atrial fibrillation ([facility] [I48.91] 01/15/2012    S/P CABG (coronary artery bypass graft) [Z95.1] 01/12/2012    CAD (coronary artery disease) [I25.10] 01/08/2012    Cardiomyopathy, ischemic [I25.5] 01/08/2012       I did not treat with IV fluid resuscitation as recommended for sepsis because the patient was already in volume overload at the time of presentation. 1. Supplemental oxygen titrated to O2 sat greater than 92%, blood culture and urine antigens, empiric broad-spectrum IV antibiotics for suspected gram-negative bacteria, continuous pulse oximetry and cardiac monitoring,  2. High-dose IV steroid burst, inhaled bronchodilators, respiratory viral PCR studies including COVID-19  3. Low-sodium diet, heart rate and blood pressure control, oral antihypertensive meds including nitrate therapy as tolerated, closely monitor I/O, body weight, serum electrolytes and creatinine  4. Continue the maintenance beta-blocker, antiplatelet and statin therapy. The patient and / or the family were informed of the results of any tests, a time was given to answer questions, a plan was proposed and they agreed with plan. Thank you Dr. Nakita Kiran MD for the opportunity to be involved in this patients care. If you have any questions or concerns please feel free to contact me at [phone].   Full Code    Yasmeen Dubois MD  8/25/2020

## 2020-08-25 NOTE — ED NOTES
1147 Consult completed, Dr Pattie Bustos from cardiology called back and spoke to Dr Mitch Pulido  08/25/20 [id number]

## 2020-08-25 NOTE — PROGRESS NOTES
Dr. Janel Ordaz notified that none of patient's AM labs were drawn and were not showing up in Epic for lab. Labs reordered.

## 2020-08-26 LAB
EKG ATRIAL RATE: 82 BPM
EKG DIAGNOSIS: NORMAL
EKG P AXIS: 72 DEGREES
EKG P-R INTERVAL: 126 MS
EKG Q-T INTERVAL: 456 MS
EKG QRS DURATION: 94 MS
EKG QTC CALCULATION (BAZETT): 532 MS
EKG R AXIS: -18 DEGREES
EKG T AXIS: 186 DEGREES
EKG VENTRICULAR RATE: 82 BPM
L. PNEUMOPHILA SEROGP 1 UR AG: NORMAL
LACTIC ACID, SEPSIS: 2.1 MMOL/L (ref 0.4–1.9)
ORGANISM: ABNORMAL
REPORT: NORMAL
RESPIRATORY PANEL PCR: ABNORMAL
STREP PNEUMONIAE ANTIGEN, URINE: NORMAL
TROPONIN: 0.19 NG/ML

## 2020-08-26 PROCEDURE — 6370000000 HC RX 637 (ALT 250 FOR IP): Performed by: INTERNAL MEDICINE

## 2020-08-26 PROCEDURE — 2500000003 HC RX 250 WO HCPCS: Performed by: INTERNAL MEDICINE

## 2020-08-26 PROCEDURE — 6360000002 HC RX W HCPCS: Performed by: INTERNAL MEDICINE

## 2020-08-26 PROCEDURE — 83605 ASSAY OF LACTIC ACID: CPT

## 2020-08-26 PROCEDURE — 2580000003 HC RX 258: Performed by: INTERNAL MEDICINE

## 2020-08-26 PROCEDURE — 84484 ASSAY OF TROPONIN QUANT: CPT

## 2020-08-26 PROCEDURE — 1200000000 HC SEMI PRIVATE

## 2020-08-26 PROCEDURE — 97530 THERAPEUTIC ACTIVITIES: CPT

## 2020-08-26 PROCEDURE — 99223 1ST HOSP IP/OBS HIGH 75: CPT | Performed by: INTERNAL MEDICINE

## 2020-08-26 PROCEDURE — 36415 COLL VENOUS BLD VENIPUNCTURE: CPT

## 2020-08-26 PROCEDURE — 97535 SELF CARE MNGMENT TRAINING: CPT

## 2020-08-26 PROCEDURE — 87040 BLOOD CULTURE FOR BACTERIA: CPT

## 2020-08-26 PROCEDURE — 97166 OT EVAL MOD COMPLEX 45 MIN: CPT

## 2020-08-26 PROCEDURE — 93010 ELECTROCARDIOGRAM REPORT: CPT | Performed by: INTERNAL MEDICINE

## 2020-08-26 PROCEDURE — 99233 SBSQ HOSP IP/OBS HIGH 50: CPT | Performed by: INTERNAL MEDICINE

## 2020-08-26 PROCEDURE — 97161 PT EVAL LOW COMPLEX 20 MIN: CPT

## 2020-08-26 PROCEDURE — 97116 GAIT TRAINING THERAPY: CPT

## 2020-08-26 PROCEDURE — 93005 ELECTROCARDIOGRAM TRACING: CPT | Performed by: INTERNAL MEDICINE

## 2020-08-26 PROCEDURE — 94640 AIRWAY INHALATION TREATMENT: CPT

## 2020-08-26 RX ORDER — FUROSEMIDE 10 MG/ML
40 INJECTION INTRAMUSCULAR; INTRAVENOUS DAILY
Status: DISCONTINUED | OUTPATIENT
Start: 2020-08-26 | End: 2020-08-27

## 2020-08-26 RX ORDER — ASPIRIN 81 MG/1
81 TABLET, CHEWABLE ORAL DAILY
Status: DISCONTINUED | OUTPATIENT
Start: 2020-08-26 | End: 2020-08-28 | Stop reason: HOSPADM

## 2020-08-26 RX ORDER — ASPIRIN 81 MG/1
324 TABLET, CHEWABLE ORAL DAILY
Status: DISCONTINUED | OUTPATIENT
Start: 2020-08-26 | End: 2020-08-26

## 2020-08-26 RX ORDER — PREDNISONE 20 MG/1
40 TABLET ORAL DAILY
Status: DISCONTINUED | OUTPATIENT
Start: 2020-08-26 | End: 2020-08-27

## 2020-08-26 RX ORDER — PANTOPRAZOLE SODIUM 40 MG/1
40 TABLET, DELAYED RELEASE ORAL
Status: DISCONTINUED | OUTPATIENT
Start: 2020-08-26 | End: 2020-08-28 | Stop reason: HOSPADM

## 2020-08-26 RX ORDER — CARVEDILOL 6.25 MG/1
6.25 TABLET ORAL 2 TIMES DAILY WITH MEALS
Status: DISCONTINUED | OUTPATIENT
Start: 2020-08-26 | End: 2020-08-28 | Stop reason: HOSPADM

## 2020-08-26 RX ORDER — ESCITALOPRAM OXALATE 10 MG/1
10 TABLET ORAL DAILY
Status: DISCONTINUED | OUTPATIENT
Start: 2020-08-26 | End: 2020-08-28 | Stop reason: HOSPADM

## 2020-08-26 RX ADMIN — Medication 10 ML: at 10:37

## 2020-08-26 RX ADMIN — ASPIRIN 81 MG 81 MG: 81 TABLET ORAL at 10:38

## 2020-08-26 RX ADMIN — DOXYCYCLINE 100 MG: 100 INJECTION, POWDER, LYOPHILIZED, FOR SOLUTION INTRAVENOUS at 13:17

## 2020-08-26 RX ADMIN — Medication 10 ML: at 22:00

## 2020-08-26 RX ADMIN — ENOXAPARIN SODIUM 60 MG: 60 INJECTION SUBCUTANEOUS at 22:00

## 2020-08-26 RX ADMIN — PANTOPRAZOLE SODIUM 40 MG: 40 TABLET, DELAYED RELEASE ORAL at 10:35

## 2020-08-26 RX ADMIN — CARVEDILOL 6.25 MG: 6.25 TABLET, FILM COATED ORAL at 17:49

## 2020-08-26 RX ADMIN — PREDNISONE 40 MG: 20 TABLET ORAL at 10:35

## 2020-08-26 RX ADMIN — CEFTRIAXONE SODIUM 1 G: 1 INJECTION, POWDER, FOR SOLUTION INTRAMUSCULAR; INTRAVENOUS at 10:35

## 2020-08-26 RX ADMIN — Medication 2 PUFF: at 01:18

## 2020-08-26 RX ADMIN — CARVEDILOL 6.25 MG: 6.25 TABLET, FILM COATED ORAL at 10:41

## 2020-08-26 RX ADMIN — ESCITALOPRAM OXALATE 10 MG: 10 TABLET ORAL at 10:36

## 2020-08-26 RX ADMIN — ATORVASTATIN CALCIUM 20 MG: 10 TABLET, FILM COATED ORAL at 10:36

## 2020-08-26 RX ADMIN — FUROSEMIDE 40 MG: 10 INJECTION, SOLUTION INTRAMUSCULAR; INTRAVENOUS at 10:41

## 2020-08-26 RX ADMIN — ENOXAPARIN SODIUM 60 MG: 60 INJECTION SUBCUTANEOUS at 10:34

## 2020-08-26 NOTE — CONSULTS
[address] [place] [address]  [phone]        Reason for Consultation/Chief Complaint: \"I have been having SOB. \"        History of Present Illness:  Rose Sutherland is a 80 y.o. patient who presented to the hospital with complaints of sudden onset of SOB chills on 8.24.20 evening. She was weak. She has cough with flegm but no chest pain. She was clammy at that time as per her son who provided history. Patient has dementia. She has history of CHF and MI( 2012) follows at the [id number] [place] [address] cardiology group. S/p CABG  Bioprosthetic MVR left atrial appendage ligation 2012. I have been asked to provide consultation regarding further management and testing. Past Medical History:  She follows with Dr Ana Rincon for her cardiology care LOV 09/24/19 PMH coronary artery disease status post CABG with bioprosthetic mitral valve replacement and left atrial appendage ligation. She had perioperative paroxsymal atrial fibrillation and a postoperative CVA, but has been maintaining sinus rhythm sinus on amiodarone. She is unaware of any symptomatic recurrence  4 vessel CABG and MVR in 2012      has a past medical history of Acute blood loss anemia, Atrial fib/flutter, transient, Bacteremia, CAD (coronary artery disease), Cardiogenic shock ([facility]), CHF (congestive heart failure) ([facility]), Chronic diastolic CHF (congestive heart failure) ([address] Encephalopathy, metabolic, History of blood transfusion, Hypertension, MI, old, Mitral regurgitation, No history of procedure, NSTEMI (non-ST elevated myocardial infarction) ([address] Old cerebral hemorrhage without late effect, Pneumonia, Respiratory failure, acute ([address] Uncomplicated late onset Alzheimer's dementia ([address] and Unspecified cerebral artery occlusion with cerebral infarction. Surgical History:   has a past surgical history that includes Dilation and curettage of uterus; Mouth surgery; Coronary artery bypass graft (1/12/12);  Mitral valve replacement (1/12/12); stated age   Head:  Normocephalic, without obvious abnormality, atraumatic   Eyes:  PERRL, conjunctiva/corneas clear       Nose: Nares normal, no drainage or sinus tenderness   Throat: Lips, mucosa, and tongue normal   Neck: Supple, symmetrical, trachea midline, no adenopathy, thyroid: not enlarged, symmetric, no tenderness/mass/nodules, no carotid bruit, +ve for JVD       Lungs:    crackles both lower half of chest with dullness and diminished breath sounds at left base. respirations unlabored   Chest Wall:  No tenderness or deformity   Heart:  Regular rate and rhythm, S1, S2 normal, no murmur, rub or gallop   Abdomen:   Soft, non-tender, bowel sounds active all four quadrants,  no masses, no organomegaly           Extremities: Extremities normal, atraumatic, no cyanosis, 2 + left 1+ rt leg edema   Pulses:  neg pulses in feet    Skin: Skin color, texture, turgor normal, no rashes or lesions   Pysch: She mumbles some times but not clear what she is saying.    Neurologic: Normal gross motor and sensory exam.         Labs  CBC:   Lab Results   Component Value Date    WBC 18.3 08/24/2020    RBC 3.78 08/24/2020    HGB 10.4 08/24/2020    HCT 32.9 08/24/2020    MCV 87.1 08/24/2020    RDW 16.1 08/24/2020     08/24/2020     CMP:    Lab Results   Component Value Date     08/25/2020    K 3.6 08/25/2020     08/25/2020    CO2 19 08/25/2020    BUN 18 08/25/2020    CREATININE 0.9 08/25/2020    GFRAA >60 08/25/2020    GFRAA >60 02/08/2013    AGRATIO 0.8 08/25/2020    LABGLOM 59 08/25/2020    LABGLOM 43 01/12/2016    GLUCOSE 123 08/25/2020    PROT 7.4 08/25/2020    PROT 7.8 02/06/2013    CALCIUM 8.7 08/25/2020    BILITOT 0.4 08/25/2020    ALKPHOS 92 08/25/2020    AST 27 08/25/2020    ALT 10 08/25/2020     PT/INR:  No results found for: PTINR  Lab Results   Component Value Date    CKTOTAL 167 05/12/2014    CKMB 1.0 02/07/2013    TROPONINI 0.19 (H) 08/26/2020     Troponin 0.11,0.37, 0.19  Pro BNP 2747  I have reviewed the following tests and documented in this encounter as follows:     EKG 20  Normal sinus rhythmInferior infarct (cited on or before 12-MAY-2014)T wave abnormality, consider anterior ischemiaProlonged QTAbnormal ECGWhen compared with ECG of 25-AUG-2020 00:07,RSR' pattern in V1 is no longer PresentBorderline criteria for Anterior infarct are no longer PresentT wave inversion now evident in Anterior leadsConfirmed by Marco Nguyen MD, [address] [employer] () on 2020 7:50:23 AM     EKG 20  Normal sinus rhythmRSR' or QR pattern in V1 suggests right ventricular conduction delayInferior infarct , age undeterminedPossible Anterior infarct , age undetermined Long QTcAbnormal ECGNo significant change was foundWhen compared with ECG of20Confirmed by Marco Nguyen MD, [address] [employer] () on 2020 7:41:29 AM     EK20  I have reviewed EKG with the following interpretation:  Impression:  Sinus tachycardia with short PRRSR' or QR pattern in V1 suggests right ventricular conduction delayPossible Inferior infarct (cited on or before 24-AUG-2020)Anterolateral infarct (cited on or before 24-AUG-2020)Abnormal ECGWhen compared with ECG of 24-AUG-2020 21:30, (unconfirmed)Premature ventricular complexes are no longer PresentConfirmed by Marco Nguyen MD, [address] [employer] () on 2020 5:40:59 AM     CXR 20  Cardiomegaly and underlying congestion with small bibasilar effusions and adjacent infiltrates representing atelectasis versus pneumonia. FINDINGS: There is chronic pulmonary change. There are bibasilar effusions. There are adjacent bibasilar infiltrates. There is underlying congestion. There is no pneumothorax. The heart is enlarged. The upper abdomen is unremarkable. The extrathoracic soft tissues are unremarkable. ECHO 14  Study Conclusions  - Procedure narrative: The study was technically limited due to poor    acoustic window availability, which decreases diagnostic accuracy. - Left ventricle:  The cavity size was normal. Wall thickness was    normal. Systolic function was normal.  estimated ejection fraction was in the range of 50% to 55%. study is not technically sufficient to allow evaluation of LV diastolic function.  - Mitral valve: A bioprosthesis was present and functioning normally. Valve area by pressure half-time: 1.86cm^2. - Atrial septum: Echo contrast study was not of sufficient quality to rule out a right-to-left level shunt.  - Pulmonic valve: Peak gradient: 1mm Hg      ECHO 2/6/12  Study Conclusions    - Left ventricle: The cavity size was normal. Wall thickness was normal. Systolic function was mildly reduced. The    estimated ejection fraction was in the range of 45% to 50%. Mild diffuse hypokinesis. Aortic valve: Mild focal    thickening. Trivial regurgitation. Mitral valve: A bioprosthesis was present. Mean gradient: 4mm Hg (D). Left atrium: The atrium was mildly dilated. Atrial septum:    Agitated saline contrast study showed no right-to-left atrial level shunt. Pericardium, extracardiac: There was a large left pleural effusion.     status post CABG x3 (1/12/12)   status-post [place] bioprosthetic valve replacement  CHF - past EF 25-30%     Assessment  Acute on chronic  Diastolic CHF  Consistent with NSTEMI in presence of anterior ischemia on EKG and rise and fall of troponin  S/P CABG  S/P mitral valve replacement  Possible underlying pneumonia  Patient Active Problem List   Diagnosis    CAD (coronary artery disease)    Cardiomyopathy, ischemic    S/P CABG (coronary artery bypass graft)    S/P MVR (mitral valve replacement)    Atrial fibrillation (H[facility])    Chronic diastolic CHF (congestive heart failure) ([address]    Hypertension    Anemia    Old cerebral hemorrhage without late effect    Pneumonia due to suspected gram-negative bacteria    Uncomplicated late onset Alzheimer's dementia ([address]    Sepsis ([address]    Flash pulmonary edema ([address]    Suspected COVID-19 virus infection Plan:  Asa  Statin   Beta blockers as tolerated  Diuresis  Echo doppler of heart  I donot recommend ischemia evaluation at this time due to presence of CHF  Will follow her closely   She is totally pain free stable breathing at room air. Thank you for allowing to us to participate in the care or [employer]. Further evaluation will be based upon the patient's clinical course and testing results.      Heydi Cruz MD

## 2020-08-26 NOTE — CARE COORDINATION
INTERDISCIPLINARY PLAN OF CARE CONFERENCE    Date/Time: 8/26/2020 3:51 PM  Completed by: Magali Melo, Case Management      Patient Name:  Mansi Jacobson  YOB: 1931  Admitting Diagnosis: Sepsis [facility]) [A41.9]     Admit Date/Time:  8/24/2020  9:25 PM    Chart reviewed. Interdisciplinary team contacted or reviewed plan related to patient progress and discharge plans. Disciplines included Case Management, Nursing, and Dietitian. Current Status: IP 08/25/2020  PT/OT recommendation for discharge plan of care:     Expected D/C Disposition:  Home  Confirmed plan with patient's son/caregiver   confirmed with: Elian Hutchins    Discharge Plan Comments: Chart reviewed. Met with pt's son/caregiver and explained the role of the CM. Plans to return home in care of family. + Will resume private HHA x 5 days a week. CM is following or potential HHC needs.        Home O2 in place on admit: No- 96% RA

## 2020-08-26 NOTE — FLOWSHEET NOTE
initiated visit with Pt and son. Listened and offered emotional support. Both in good spirits, hoping for Pt to get home soon. Stated they are members of a [facility] [address]. Prayed with Pt and son for Pt's care and health. [address]       08/26/20 1016   Encounter Summary   Services provided to: Patient and family together   Referral/Consult From: [facility] Family members   Continue Visiting   (8/26 initial, sppt and prayer)   Complexity of Encounter Moderate   Length of Encounter 15 minutes   Spiritual Assessment Completed Yes   Spiritual/Nondenominational   Type Spiritual support   Assessment Calm; Approachable;Passive;Coping   Intervention Active listening;Explored feelings, thoughts, concerns;Prayer;Discussed belief system/Cheondoism practices/sally;Discussed illness/injury and it's impact   Outcome Comfort;Expressed gratitude;Engaged in conversation;Expressed feelings/needs/concerns

## 2020-08-26 NOTE — PROGRESS NOTES
4 Eyes Skin Assessment     The patient is being assess for   Transfer to New Unit    I agree that 2 RN's have performed a thorough Head to Toe Skin Assessment on the patient. ALL assessment sites listed below have been assessed. Areas assessed by both nurses:   [x]   Head, Face, and Ears   [x]   Shoulders, Back, and Chest, Abdomen  [x]   Arms, Elbows, and Hands   [x]   Coccyx, Sacrum, and Ischium  [x]   Legs, Feet, and Heels          **SHARE this note so that the co-signing nurse is able to place an eSignature**    Co-signer eSignature: Electronically signed by Milton Rizo RN on 8/26/20 at 6:41 AM EDT    Does the Patient have Skin Breakdown?   No          Casey Prevention initiated:  Yes   Wound Care Orders initiated:  No      [facility] nurse consulted for Pressure Injury (Stage 3,4, Unstageable, DTI, NWPT, Complex wounds)and New or Established Ostomies:  NA      Primary Nurse eSignature: Electronically signed by Sudha Ratliff RN on 8/26/20 at 2:47 AM EDT

## 2020-08-26 NOTE — PROGRESS NOTES
cleaning, laundry  Private Duty HHA  x 5 days a week  Pt. Fully independent for transfers and gait and walked with RW, uses cane in community and assist of family member. History of falls: 2 falls  Hobbies: watching TV (game shows)      Pain  No  Rating:NA  Location:NA   Pain Medicine Status: No request made      Cognition    A&O Person, Place and Situation   Able to follow 1 step commands    Subjective  Patient lying supine in bed with family present (son). Pt agreeable to this OT eval & tx. Upper Extremity ROM:    B UE shoulder flexion to 90 degrees    Upper Extremity Strength:    B shoulder flexion 3/5  B elbow 4/5  B  3+/5    Coordination and Tone  WFL    Balance  Functional Sitting Balance:  WFL  Functional Standing Balance:Impaired    Bed mobility:    Supine to Sit:               Min A   Sit to Supine:               Not Tested  Rolling:                        Not Tested  Scooting in sitting:       Min A   Scooting in supine:     Not Tested    Transfers:    Sit to stand:                 Min A   Stand to sit:                 Min A  while sitting on the commode, CGA while sitting on the EOB. Bed to Chair:               Min A  and 2 persons with use of rolling walker (RW) with IV pole  Standard toilet: Min A  Bed to [facility]:  Not Tested    Dressing:      UE:   Not Tested  LE:    Max A, pt pulled pants up with assist.  Assist needed to don B socks, thread B feet into brief. Bathing:    UE:  Not Tested  LE:  Not Tested    Eating:   Not Tested    Toileting: Mod A-assist to clean thoroughly after BM and min A to pull up brief. Activity Tolerance   Pt completed therapy session with SOB noted with activity  Spo2 = 96%  HR = 92 BPM  BP = 153/78    Positioning Needs:   Up in chair, call light and needs in reach. Son present, alarm set, nurse aware.     Exercise / Activities Initiated:   N/A    Patient/Family Education:   Role of OT  Recommendations for DC  Safe RW use/hand placement    Assessment of

## 2020-08-26 NOTE — PROGRESS NOTES
IM Progress Note    Admit Date:  8/24/2020  1    Interval history:  Elderly female with dementia admitted for sudden onset of sob  Dx with chf and NSTEMI      Subjective:  Ms. Shanel Booker seen up in bed, denies any chest pain, on RA today   Denies any sob       Objective:   /69   Pulse 84   Temp 98.6 °F (37 °C) (Oral)   Resp 24   Ht 5' 2\" (1.575 m)   Wt 134 lb 8 oz (61 kg)   SpO2 96%   BMI 24.60 kg/m²       Intake/Output Summary (Last 24 hours) at 8/26/2020 1419  Last data filed at 8/26/2020 0819  Gross per 24 hour   Intake 1230 ml   Output 400 ml   Net 830 ml       Physical Exam:        General:  Elderly female , baseline dementia. Pleasant  Awake, alert and disoriented. Appears to be not in any distress  Mucous Membranes:  Pink , anicteric  Neck: No JVD, no carotid bruit, no thyromegaly  Chest:  Clear to auscultation bilaterally, diminished in bases with crackles  Cardiovascular:  RRR S1S2 heard, no murmurs or gallops  Abdomen:  Soft, undistended, non tender, no organomegaly, BS present  Extremities: No edema or cyanosis.  Distal pulses well felt  Neurological : grossly normal with dementia         Medications:   Scheduled Medications:    carvedilol  6.25 mg Oral BID WC    pantoprazole  40 mg Oral QAM AC    escitalopram  10 mg Oral Daily    enoxaparin  1 mg/kg Subcutaneous BID    cefTRIAXone (ROCEPHIN) IV  1 g Intravenous Q24H    predniSONE  40 mg Oral Daily    aspirin  81 mg Oral Daily    furosemide  40 mg Intravenous Daily    sodium chloride flush  10 mL Intravenous 2 times per day    atorvastatin  20 mg Oral Daily    doxycycline (VIBRAMYCIN) IV  100 mg Intravenous Q12H     I  perflutren lipid microspheres, sodium chloride flush, acetaminophen **OR** acetaminophen, albuterol sulfate HFA    Lab Data:  Recent Labs     08/24/20 2130   WBC 18.3*   HGB 10.4*   HCT 32.9*   MCV 87.1        Recent Labs     08/24/20 2130 08/25/20  1724    133*   K 3.9 3.6    101   CO2 27 19*   PHOS --  2.7   BUN 17 18   CREATININE 1.0 0.9     Recent Labs     08/25/20  0150 08/26/20  0850   TROPONINI 0.37* 0.19*       Coagulation:   Lab Results   Component Value Date    INR 1.12 08/25/2020    APTT 32.2 08/25/2020     Cardiac markers:   Lab Results   Component Value Date    CKMB 1.0 02/07/2013    CKTOTAL 167 05/12/2014    TROPONINI 0.19 08/26/2020         Lab Results   Component Value Date    ALT 10 08/25/2020    AST 27 08/25/2020    ALKPHOS 92 08/25/2020    BILITOT 0.4 08/25/2020       Lab Results   Component Value Date    INR 1.12 08/25/2020    INR 1.02 08/24/2020    INR 1.06 09/08/2018    PROTIME 13.0 08/25/2020    PROTIME 11.8 08/24/2020    PROTIME 12.1 09/08/2018       Radiology    Chest xray   Cardiomegaly and underlying congestion with small bibasilar effusions and    adjacent infiltrates representing atelectasis versus pneumonia.           ECHO 2016   Summary   Normal left ventricle size, wall thickness and systolic function with an   estimated ejection fraction of 55%. No regional wall motion abnormalities   are seen. There is reversal of E/A inflow velocities across the mitral valve   suggesting impaired left ventricular relaxation. Normally functioning bioprosthetic artificial valve in mitral position. No evidence of mitral regurgitation. The maximum mitral valve pressure   gradient is 16 mmHg and the mean pressure gradient is 4.5 mmHg. The right ventricle is mildly dilated. Although TAPSE is normal, visually   RVEF appears reduced. Cultures:      covid neg  resp PCR - adeno virus    Assessment & Plan:      1. NSTEMi with CHF - sudden onset of sob with CHF , Abn EKG with anterior ischemic changes and elevated troponins       - ASA, statins, full dose lovenox, ECHo, cardiology consulted  -  Pt denies any active chest pain on adm or now.    - ischemic workup per cards or medical mx  - diuresis with lasix  - pending ECHO     2. CAD s.p CABG with ischemic CM - continue BB, statins,  Consider ACEI   3. Possible bronchitis - on rocephin and doxy - can stop doxy given adenovirus in resp panel . Steroids for bronchospasm. Wean off quickly. Prn IBD    4.  Dementia  - at baseline      dvt prophylaxis      Sindy Lay MD 8/26/2020 2:19 PM

## 2020-08-26 NOTE — PROGRESS NOTES
Pt is missing clothes and shoes, I called ICU , ED and security all report items items are not there. Pt son aware is more concerned about us finding shoes. LVM for nurse manager Massimo Glover.

## 2020-08-26 NOTE — PROGRESS NOTES
appropriate to complete stairs at this time    Activity Tolerance   Pt completed therapy session with SOB noted with activity  Spo2 = 96% at rest, with activity 93%  HR = 92 BPM at rest, with activity 101 bpm  BP = 153/78    Positioning Needs   Pt up in chair, alarm set, positioned in proper neutral alignment and pressure relief provided. Call light provided and all needs within reach    Exercises Initiated  all completed bilaterally unless indicated  Ankle Pumps x 10 reps    Other  None. Patient/Family Education   Pt educated on role of inpatient PT, POC, importance of continued activity, DC recommendations, safety awareness, transfer techniques, pursed lip breathing, energy conservation, pacing activity and calling for assist with mobility. Assessment  Pt seen for Physical Therapy evaluation in acute care setting. Patient had poor safety awareness and was unsafe during ambulation with RW and was drifting away. Patient was explained to call for assist using call bell when in need to get up from the chair to go to bathroom and bed. Patient had poor vision as well. Pt demonstrated decreased Activity tolerance, Balance, Safety and Strength as well as decreased independence with Ambulation, Bed Mobility  and Transfers. Recommending Home 24 hr assist, with home PT and BSC upon discharge as patient functioning close to baseline level and would benefit from continued therapy services    Goals : To be met in 3 visits:  1). Independent with LE Ex x 10 reps    To be met in 6 visits:  1). Supine to/from sit: Supervision  2). Sit to/from stand: CGA  3). Bed to chair: CGA  4). Gait: Ambulate 150 ft.  with  CGA and use of LRAD (least restrictive assistive device)  5). Tolerate B LE exercises 3 sets of 10-15 reps  6).   Ascend/descend 2 steps with CGA with use of no handrail and LRAD (least restrictive assistive device)    Rehabilitation Potential: Fair  Strengths for achieving goals include:   Pt motivated, PLOF, Family Support and Pt cooperative   Barriers to achieving goals include:    poor safety awareness. Plan    To be seen 3-5 x / week  while in acute care setting for therapeutic exercises, bed mobility, transfers, progressive gait training, balance training, and family/patient education. Signature: Cooper Trevino MS PT, # [id number]      If patient discharges from this facility prior to next visit, this note will serve as the Discharge Summary.

## 2020-08-26 NOTE — PLAN OF CARE
cerebral artery occlusion with cerebral infarction. >>For CHF and Comorbidity documentation on Education Time and Topics, please see Education Tab    Progressive Mobility Assessment:  What is this patient's Current Level of Mobility?: Wheelchair    Dependent  How was this patient Mobilized today?: Edge of Bed, Up to Chair, Bedside Commode,  Up to Toilet/Shower, Up in Room, and Up in Hallway                 With Whom? Nurse, PCA, PT, and OT                 Level of Difficulty/Assistance: 2x Assist     Pt resting in bed at this time on room air. Pt with complaints of shortness of breath. Pt without lower extremity edema.      Patient and/or Family's stated Goal of Care this Admission: reduce shortness of breath, increase activity tolerance, better understand heart failure and disease management, be more comfortable, and reduce lower extremity edema prior to discharge        :

## 2020-08-27 LAB
ANION GAP SERPL CALCULATED.3IONS-SCNC: 13 MMOL/L (ref 3–16)
BASOPHILS ABSOLUTE: 0.1 K/UL (ref 0–0.2)
BASOPHILS RELATIVE PERCENT: 0.6 %
BUN BLDV-MCNC: 25 MG/DL (ref 7–20)
CALCIUM SERPL-MCNC: 9 MG/DL (ref 8.3–10.6)
CHLORIDE BLD-SCNC: 103 MMOL/L (ref 99–110)
CO2: 23 MMOL/L (ref 21–32)
CREAT SERPL-MCNC: 0.9 MG/DL (ref 0.6–1.2)
EOSINOPHILS ABSOLUTE: 0 K/UL (ref 0–0.6)
EOSINOPHILS RELATIVE PERCENT: 0.2 %
GFR AFRICAN AMERICAN: >60
GFR NON-AFRICAN AMERICAN: 59
GLUCOSE BLD-MCNC: 99 MG/DL (ref 70–99)
HCT VFR BLD CALC: 35.1 % (ref 36–48)
HEMOGLOBIN: 10.7 G/DL (ref 12–16)
LV EF: 38 %
LVEF MODALITY: NORMAL
LYMPHOCYTES ABSOLUTE: 1.3 K/UL (ref 1–5.1)
LYMPHOCYTES RELATIVE PERCENT: 11.6 %
MCH RBC QN AUTO: 27.3 PG (ref 26–34)
MCHC RBC AUTO-ENTMCNC: 30.5 G/DL (ref 31–36)
MCV RBC AUTO: 89.4 FL (ref 80–100)
MONOCYTES ABSOLUTE: 0.5 K/UL (ref 0–1.3)
MONOCYTES RELATIVE PERCENT: 4.6 %
NEUTROPHILS ABSOLUTE: 9.5 K/UL (ref 1.7–7.7)
NEUTROPHILS RELATIVE PERCENT: 83 %
PDW BLD-RTO: 16.4 % (ref 12.4–15.4)
PLATELET # BLD: 282 K/UL (ref 135–450)
PMV BLD AUTO: 8.4 FL (ref 5–10.5)
POTASSIUM REFLEX MAGNESIUM: 3.7 MMOL/L (ref 3.5–5.1)
RBC # BLD: 3.93 M/UL (ref 4–5.2)
SODIUM BLD-SCNC: 139 MMOL/L (ref 136–145)
WBC # BLD: 11.4 K/UL (ref 4–11)

## 2020-08-27 PROCEDURE — C8929 TTE W OR WO FOL WCON,DOPPLER: HCPCS

## 2020-08-27 PROCEDURE — 1200000000 HC SEMI PRIVATE

## 2020-08-27 PROCEDURE — 6370000000 HC RX 637 (ALT 250 FOR IP): Performed by: INTERNAL MEDICINE

## 2020-08-27 PROCEDURE — 99232 SBSQ HOSP IP/OBS MODERATE 35: CPT | Performed by: INTERNAL MEDICINE

## 2020-08-27 PROCEDURE — 2580000003 HC RX 258: Performed by: INTERNAL MEDICINE

## 2020-08-27 PROCEDURE — 80048 BASIC METABOLIC PNL TOTAL CA: CPT

## 2020-08-27 PROCEDURE — 97530 THERAPEUTIC ACTIVITIES: CPT

## 2020-08-27 PROCEDURE — 85025 COMPLETE CBC W/AUTO DIFF WBC: CPT

## 2020-08-27 PROCEDURE — 6360000004 HC RX CONTRAST MEDICATION: Performed by: INTERNAL MEDICINE

## 2020-08-27 PROCEDURE — 6360000002 HC RX W HCPCS: Performed by: INTERNAL MEDICINE

## 2020-08-27 PROCEDURE — 97110 THERAPEUTIC EXERCISES: CPT

## 2020-08-27 PROCEDURE — 36415 COLL VENOUS BLD VENIPUNCTURE: CPT

## 2020-08-27 RX ORDER — FUROSEMIDE 40 MG/1
40 TABLET ORAL DAILY
Status: DISCONTINUED | OUTPATIENT
Start: 2020-08-27 | End: 2020-08-28 | Stop reason: HOSPADM

## 2020-08-27 RX ADMIN — ESCITALOPRAM OXALATE 10 MG: 10 TABLET ORAL at 10:17

## 2020-08-27 RX ADMIN — ASPIRIN 81 MG 81 MG: 81 TABLET ORAL at 10:17

## 2020-08-27 RX ADMIN — PANTOPRAZOLE SODIUM 40 MG: 40 TABLET, DELAYED RELEASE ORAL at 06:51

## 2020-08-27 RX ADMIN — FUROSEMIDE 40 MG: 40 TABLET ORAL at 10:28

## 2020-08-27 RX ADMIN — ENOXAPARIN SODIUM 40 MG: 40 INJECTION SUBCUTANEOUS at 10:28

## 2020-08-27 RX ADMIN — ATORVASTATIN CALCIUM 20 MG: 10 TABLET, FILM COATED ORAL at 10:17

## 2020-08-27 RX ADMIN — CEFTRIAXONE SODIUM 1 G: 1 INJECTION, POWDER, FOR SOLUTION INTRAMUSCULAR; INTRAVENOUS at 10:17

## 2020-08-27 RX ADMIN — PREDNISONE 40 MG: 20 TABLET ORAL at 10:17

## 2020-08-27 RX ADMIN — CARVEDILOL 6.25 MG: 6.25 TABLET, FILM COATED ORAL at 16:22

## 2020-08-27 RX ADMIN — Medication 10 ML: at 20:50

## 2020-08-27 RX ADMIN — PERFLUTREN 1.65 MG: 6.52 INJECTION, SUSPENSION INTRAVENOUS at 09:54

## 2020-08-27 RX ADMIN — Medication 10 ML: at 10:18

## 2020-08-27 RX ADMIN — CARVEDILOL 6.25 MG: 6.25 TABLET, FILM COATED ORAL at 10:17

## 2020-08-27 NOTE — PROGRESS NOTES
Pt alert and oriented, son at bedside. AM assessment complete. Vital signs stable. MD at bedside. Pt refuses to get to chair, educated on importance of movement. Lung sounds clear, s1 s2. Nonpitting trace edema to ble. No other needs identified at this time. Will continue to monitor.

## 2020-08-27 NOTE — PROGRESS NOTES
IM Progress Note    Admit Date:  8/24/2020  2    Interval history:  Elderly female with dementia admitted for sudden onset of sob  Dx with chf and NSTEMI      Subjective:    Ms. Edgar Lawton seen up in bed, on RA today   Denies any sob or chest pain   Son at bedside      Objective:   BP (!) 163/90   Pulse 87   Temp 98.2 °F (36.8 °C) (Oral)   Resp 22   Ht 5' 2\" (1.575 m)   Wt 137 lb 1.6 oz (62.2 kg)   SpO2 99%   BMI 25.08 kg/m²       Intake/Output Summary (Last 24 hours) at 8/27/2020 0748  Last data filed at 8/26/2020 1916  Gross per 24 hour   Intake 600 ml   Output 1000 ml   Net -400 ml       Physical Exam:        General:  Elderly female , baseline dementia. Pleasant  Awake, alert and disoriented. Appears to be not in any distress  Mucous Membranes:  Pink , anicteric  Neck: No JVD, no carotid bruit, no thyromegaly  Chest:  Clear to auscultation bilaterally, diminished in bases with crackles  Cardiovascular:  RRR S1S2 heard, no murmurs or gallops  Abdomen:  Soft, undistended, non tender, no organomegaly, BS present  Extremities: No edema or cyanosis.  Distal pulses well felt  Neurological : grossly normal with dementia         Medications:   Scheduled Medications:    carvedilol  6.25 mg Oral BID WC    pantoprazole  40 mg Oral QAM AC    escitalopram  10 mg Oral Daily    enoxaparin  1 mg/kg Subcutaneous BID    cefTRIAXone (ROCEPHIN) IV  1 g Intravenous Q24H    predniSONE  40 mg Oral Daily    aspirin  81 mg Oral Daily    furosemide  40 mg Intravenous Daily    sodium chloride flush  10 mL Intravenous 2 times per day    atorvastatin  20 mg Oral Daily     I  perflutren lipid microspheres, sodium chloride flush, acetaminophen **OR** acetaminophen, albuterol sulfate HFA    Lab Data:  Recent Labs     08/24/20 2130   WBC 18.3*   HGB 10.4*   HCT 32.9*   MCV 87.1        Recent Labs     08/24/20  2130 08/25/20  1724 08/27/20  0525    133* 139   K 3.9 3.6 3.7    101 103   CO2 27 19* 23   PHOS  --  2.7 --    BUN 17 18 25*   CREATININE 1.0 0.9 0.9     Recent Labs     08/25/20  0150 08/26/20  0850   TROPONINI 0.37* 0.19*       Coagulation:   Lab Results   Component Value Date    INR 1.12 08/25/2020    APTT 32.2 08/25/2020     Cardiac markers:   Lab Results   Component Value Date    CKMB 1.0 02/07/2013    CKTOTAL 167 05/12/2014    TROPONINI 0.19 08/26/2020         Lab Results   Component Value Date    ALT 10 08/25/2020    AST 27 08/25/2020    ALKPHOS 92 08/25/2020    BILITOT 0.4 08/25/2020       Lab Results   Component Value Date    INR 1.12 08/25/2020    INR 1.02 08/24/2020    INR 1.06 09/08/2018    PROTIME 13.0 08/25/2020    PROTIME 11.8 08/24/2020    PROTIME 12.1 09/08/2018       Radiology    Chest xray   Cardiomegaly and underlying congestion with small bibasilar effusions and    adjacent infiltrates representing atelectasis versus pneumonia.           ECHO 2016   Summary   Normal left ventricle size, wall thickness and systolic function with an   estimated ejection fraction of 55%. No regional wall motion abnormalities   are seen. There is reversal of E/A inflow velocities across the mitral valve   suggesting impaired left ventricular relaxation. Normally functioning bioprosthetic artificial valve in mitral position. No evidence of mitral regurgitation. The maximum mitral valve pressure   gradient is 16 mmHg and the mean pressure gradient is 4.5 mmHg. The right ventricle is mildly dilated. Although TAPSE is normal, visually   RVEF appears reduced. Cultures:      covid neg  resp PCR - adeno virus    Assessment & Plan:      1. NSTEMi with CHF - sudden onset of sob with CHF , Abn EKG with anterior ischemic changes and elevated troponins       - ASA, statins, full dose lovenox, ECHo is pending  cardiology consulted  -  Pt denies any active chest pain on adm or now.    - cards recommend medical mx.   - diuresis with lasix as needed  - pending ECHO     2. CAD s.p CABG with ischemic CM - continue BB, statins,  Consider ACEI     3. Possible bronchitis - on rocephin and doxy - can stop doxy given adenovirus in resp   panel . Steroids for bronchospasm given , improved, stop prednsione   IBD as needed     4.  Dementia  - at baseline      dvt prophylaxis  Dc planning ok   D/w son       Kathe Wiseman MD 8/27/2020 7:48 AM

## 2020-08-27 NOTE — PROGRESS NOTES
[facility] Daily Progress Note      Admit Date:  8/24/2020    Subjective:  Ms. Carmita Spencer is seen for cardiology follow up  Today denies chest pain, shortness of breath, edema, dizziness, palpitations and syncope. Reason for Consultation/Chief Complaint: \"I have been having SOB. \"           History of Present Illness:  Pamela Nunez is a 80 y.o. patient who presented to the hospital with complaints of sudden onset of SOB chills on 8.24.20 evening. She was weak. She has cough with flegm but no chest pain. She was clammy at that time as per her son who provided history. Patient has dementia. She has history of CHF and MI( 2012) follows at the [address] [facility] cardiology group. S/p CABG  Bioprosthetic MVR left atrial appendage ligation 2012.     ROS:  12 point ROS negative in all areas as listed below except as in [place]  Constitutional, EENT, Cardiovascular, pulmonary, GI, , Musculoskeletal, skin, neurological, hematological, endocrine, Psychiatric    Past Medical History:   Diagnosis Date    Acute blood loss anemia 1/13/2012    Atrial fib/flutter, transient     Bacteremia 09/08/2018    E coli    CAD (coronary artery disease)     Cardiogenic shock ([address] 1/6/2012    [facility]     Chronic diastolic CHF (congestive heart failure) ([address] 11/26/2014    Encephalopathy, metabolic 5/62/8808    WNL EEG and CT HEAD.      History of blood transfusion     Hypertension     MI, old 01/12    Mitral regurgitation 1/8/2012    No history of procedure 10/03/2016    no history of colonoscopy    NSTEMI (non-ST elevated myocardial infarction) ([address] 1/7/2012    Old cerebral hemorrhage without late effect 2/7/2017    Pneumonia 2/6/2013    Respiratory failure, acute ([address] 0/5/7110    Uncomplicated late onset Alzheimer's dementia ([address] 12/13/2018    Unspecified cerebral artery occlusion with cerebral infarction      Past Surgical History:   Procedure Laterality Date    CARDIAC SURGERY      CORONARY ARTERY BYPASS GRAFT  1/12/12    DILATION AND CURETTAGE OF UTERUS      ENDOSCOPY, COLON, DIAGNOSTIC      JOINT REPLACEMENT      MITRAL VALVE REPLACEMENT  1/12/12    porcine valve    MOUTH SURGERY      TRACHEOSTOMY TUBE PLACEMENT      UPPER GASTROINTESTINAL ENDOSCOPY  10/03/2016       Objective:   BP (!) 163/90   Pulse 87   Temp 98.2 °F (36.8 °C) (Oral)   Resp 22   Ht 5' 2\" (1.575 m)   Wt 137 lb 1.6 oz (62.2 kg)   SpO2 99%   BMI 25.08 kg/m²       Intake/Output Summary (Last 24 hours) at 8/27/2020 6666  Last data filed at 8/26/2020 1916  Gross per 24 hour   Intake 600 ml   Output 1000 ml   Net -400 ml       TELEMETRY:   NSR 80 per min    Physical Exam:  General: No Respiratory distress, appears well developed and well nourished. Eyes:  Sclera nonicteric  Nose/Sinuses:  negative findings: nose shows no deformity, asymmetry, or inflammation, nasal mucosa normal, septum midline with no perforation or bleeding  Back:  no pain to palpation  Joint:  no active joint inflammation  Musculoskeletal:  negative  Skin:  Warm and dry  Neck:  Negative for JVD and Carotid Bruits. Chest:  Clear to auscultation, respiration easy  Cardiovascular:  RRR, S1S2 normal, no murmur, no rub or thrill.   Abdomen:  Soft normal liver and spleen  Extremities:   No edema, clubbing, cyanosis,  Neuro: intact    Medications:    carvedilol  6.25 mg Oral BID WC    pantoprazole  40 mg Oral QAM AC    escitalopram  10 mg Oral Daily    enoxaparin  1 mg/kg Subcutaneous BID    cefTRIAXone (ROCEPHIN) IV  1 g Intravenous Q24H    predniSONE  40 mg Oral Daily    aspirin  81 mg Oral Daily    furosemide  40 mg Intravenous Daily    sodium chloride flush  10 mL Intravenous 2 times per day    atorvastatin  20 mg Oral Daily       perflutren lipid microspheres, sodium chloride flush, acetaminophen **OR** acetaminophen, albuterol sulfate HFA    Lab Data:  CBC:   Recent Labs     08/24/20  2130   WBC 18.3*   HGB 10.4*   HCT 32.9*   MCV COVID-19 virus infection 37/28/0830    Uncomplicated late onset Alzheimer's dementia ([facility] 12/13/2018    Pneumonia due to suspected gram-negative bacteria 09/08/2018    Old cerebral hemorrhage without late effect 02/07/2017    Elevated troponin 09/03/2016    Anemia 09/03/2016    Hypertension     Chronic diastolic CHF (congestive heart failure) ([facility] 11/26/2014    Atrial fibrillation ([facility] 01/15/2012    S/P CABG (coronary artery bypass graft) 01/12/2012    S/P MVR (mitral valve replacement) 01/12/2012    CAD (coronary artery disease) 01/08/2012    Cardiomyopathy, ischemic 01/08/2012       Plan:  [place] [facility]  Statin   Beta blockers as tolerated  Diuresis  May stop full anticoagulation  Echo doppler of heart  If no significant issue with that then she can be discharged  And follow with her primary cardiologist.  I donot recommend ischemia evaluation at this time due to presence of CHF/and pneumonia  Will follow her closely   She is totally pain free stable breathing at room air.   ·     Shakeel Brown MD 8/27/2020 7:21 AM

## 2020-08-27 NOTE — FLOWSHEET NOTE
08/27/20 1545   Vital Signs   Temp 97.1 °F (36.2 °C)   Temp Source Oral   Pulse 80   Heart Rate Source Monitor   Resp 16   BP (!) 164/83   BP Location Right upper arm   BP Upper/Lower Upper   Patient Position Sitting   Oxygen Therapy   SpO2 100 %   O2 Device None (Room air)   Pt sitting up in chair. Requests snack, snack provided. Updated on POC, results of echo not back yet, discharge unknown depending on results. Son at bedside updated. Chair alarm set. Call light within reach. No needs at this time. Will continue to monitor.

## 2020-08-27 NOTE — PROGRESS NOTES
Occupational Therapy Daily Treatment Note    Unit: [address] [place]  Date:  8/27/2020  Patient Name:    Rose Sutherland  Admitting diagnosis:  Sepsis [facility]) [A41.9]  Admit Date:  8/24/2020  Precautions/Restrictions: fall risk, IV, bed/chair alarm, purewick catheter, confusion and telemetry, hard of hearing, decreased vision due to macular degeneration        Discharge Recommendations: Home with 24/7 assist and home therapy  DME needs for discharge: Needs Met       Therapy recommendations for staff:   Assist of 1 (minimal assist) with use of rolling walker (RW) and gait belt for all transfers to/from BSC/chair    AM-PAC Score: AM-PAC Inpatient Daily Activity Raw Score: 16  Home Health S4 Level: Level 1- Standard       Treatment Time:  9240-4579  Treatment number:  2    Total Treatment Time:   25 minutes    History of Present Illness: (per Cardiology consult Dr. Harlen Oppenheim 8/26/20) Mandi Brice a 80 y. o. patient who presented to the hospital with complaints of sudden onset of SOB chills on 8.24.20 evening. She was weak. She has cough with flegm but no chest pain. She was clammy at that time as per her son who provided history. Patient has dementia. She has history of CHF and MI( 2012) follows at the [address] [facility] cardiology group. S/p CABG  Bioprosthetic MVR left atrial appendage ligation 2012.       Subjective:  Pt was found supine in bed, was receptive to therapy.     Pain   No  Rating: NA  Location: NA  Pain Medicine Status: No request made      Bed Mobility:   Supine to Sit:  Min A  Sit to Supine:  Not Tested  Rolling:           Not Tested  Scooting:        SBA    Transfer Training:   Sit to stand:   CGA  Stand to sit:  CGA  Bed to Chair:  CGA with RW, assist for IV line  Bed to [facility]:   Not Tested  Standard toilet:   Not Tested    Activity Tolerance   Pt completed therapy session with No adverse symptoms noted w/activity    ADL Training:   Upper body dressing:  Not Tested  Upper body bathing:  Not Tested  Lower body dressing:  Not Tested  Lower body bathing:  Not Tested  Toileting:   Not Tested  Grooming/Hygiene:  Not Tested  Feeding:    setup    Therapeutic Exercise:   Shoulder flex/ext:  x15  Bench press: x15 (R UE only due to IV in L elbow crease)  Shoulder abd/add: x15  Horizontal abd/add: x15   Finger flex/ext: x15    Patient Education:   Role of OT  Recommendations for DC  Safe RW use/hand placement   UE there ex technique    Positioning Needs:   Up in chair, call light and needs in reach. Alarm set, nurse notified    Family Present:  Yes    Assessment: Pt tolerated tx well with improved mobility. Pt should continue to benefit from skilled OT tx to maximize independence so that she may eventually return home with the least amount of assistance. GOALS  To be met in 3 Visits:  1). Bed to toilet: CGA     To be met in 5 Visits:  1). Supine to/from Sit:             Supervision  2). Upper Body Bathing:         Supervision  3). Lower Body Bathing: Mod A  4). Upper Body Dressing:       Min A  5). Lower Body Dressing: Mod A  6).  Pt to demonstrate UE exs x 15 reps with minimal cues  (Goal met 8/27/20)       Plan: cont with [facility] MS, OTR/L  #[id number]        If patient discharges from this facility prior to next visit, this note will serve as the Discharge Summary

## 2020-08-27 NOTE — PROGRESS NOTES
Shift assessment complete. See flow sheet. Due medications administered per MD orders. See MAR. Vital signs stable. Patient is alert and oriented x 4, periods of confusion. Pt denies any present needs/concerns. Call light explained and in reach. Will continue to monitor.

## 2020-08-27 NOTE — PLAN OF CARE
Problem: Falls - Risk of:  Goal: Will remain free from falls  Description: Will remain free from falls  Outcome: Ongoing  Goal: Absence of physical injury  Description: Absence of physical injury  Outcome: Ongoing     Problem: Skin Integrity:  Goal: Will show no infection signs and symptoms  Description: Will show no infection signs and symptoms  Outcome: Ongoing  Goal: Absence of new skin breakdown  Description: Absence of new skin breakdown  Outcome: Ongoing     Problem: OXYGENATION/RESPIRATORY FUNCTION  Goal: Patient will maintain patent airway  Outcome: Ongoing  Goal: Patient will achieve/maintain normal respiratory rate/effort  Description: Respiratory rate and effort will be within normal limits for the patient  Outcome: Ongoing     Problem: HEMODYNAMIC STATUS  Goal: Patient has stable vital signs and fluid balance  Outcome: Ongoing     Problem: FLUID AND ELECTROLYTE IMBALANCE  Goal: Fluid and electrolyte balance are achieved/maintained  Outcome: Ongoing     Problem: ACTIVITY INTOLERANCE/IMPAIRED MOBILITY  Goal: Mobility/activity is maintained at optimum level for patient  Outcome: Ongoing

## 2020-08-28 VITALS
HEIGHT: 62 IN | BODY MASS INDEX: 25.23 KG/M2 | WEIGHT: 137.1 LBS | DIASTOLIC BLOOD PRESSURE: 74 MMHG | SYSTOLIC BLOOD PRESSURE: 142 MMHG | TEMPERATURE: 97.1 F | HEART RATE: 78 BPM | RESPIRATION RATE: 18 BRPM | OXYGEN SATURATION: 97 %

## 2020-08-28 LAB
ANION GAP SERPL CALCULATED.3IONS-SCNC: 9 MMOL/L (ref 3–16)
BASOPHILS ABSOLUTE: 0 K/UL (ref 0–0.2)
BASOPHILS RELATIVE PERCENT: 0.5 %
BUN BLDV-MCNC: 23 MG/DL (ref 7–20)
CALCIUM SERPL-MCNC: 8.8 MG/DL (ref 8.3–10.6)
CHLORIDE BLD-SCNC: 103 MMOL/L (ref 99–110)
CO2: 28 MMOL/L (ref 21–32)
CREAT SERPL-MCNC: 0.8 MG/DL (ref 0.6–1.2)
CULTURE, BLOOD 2: NORMAL
EOSINOPHILS ABSOLUTE: 0.1 K/UL (ref 0–0.6)
EOSINOPHILS RELATIVE PERCENT: 1 %
GFR AFRICAN AMERICAN: >60
GFR NON-AFRICAN AMERICAN: >60
GLUCOSE BLD-MCNC: 88 MG/DL (ref 70–99)
HCT VFR BLD CALC: 33.1 % (ref 36–48)
HEMOGLOBIN: 10.2 G/DL (ref 12–16)
LYMPHOCYTES ABSOLUTE: 1.4 K/UL (ref 1–5.1)
LYMPHOCYTES RELATIVE PERCENT: 14.9 %
MAGNESIUM: 2.3 MG/DL (ref 1.8–2.4)
MCH RBC QN AUTO: 26.8 PG (ref 26–34)
MCHC RBC AUTO-ENTMCNC: 30.8 G/DL (ref 31–36)
MCV RBC AUTO: 86.9 FL (ref 80–100)
MONOCYTES ABSOLUTE: 0.7 K/UL (ref 0–1.3)
MONOCYTES RELATIVE PERCENT: 7 %
NEUTROPHILS ABSOLUTE: 7.3 K/UL (ref 1.7–7.7)
NEUTROPHILS RELATIVE PERCENT: 76.6 %
PDW BLD-RTO: 16.3 % (ref 12.4–15.4)
PLATELET # BLD: 314 K/UL (ref 135–450)
PMV BLD AUTO: 7.7 FL (ref 5–10.5)
POTASSIUM REFLEX MAGNESIUM: 3.5 MMOL/L (ref 3.5–5.1)
RBC # BLD: 3.81 M/UL (ref 4–5.2)
SODIUM BLD-SCNC: 140 MMOL/L (ref 136–145)
WBC # BLD: 9.6 K/UL (ref 4–11)

## 2020-08-28 PROCEDURE — 83735 ASSAY OF MAGNESIUM: CPT

## 2020-08-28 PROCEDURE — 2580000003 HC RX 258: Performed by: INTERNAL MEDICINE

## 2020-08-28 PROCEDURE — 6370000000 HC RX 637 (ALT 250 FOR IP): Performed by: INTERNAL MEDICINE

## 2020-08-28 PROCEDURE — 36415 COLL VENOUS BLD VENIPUNCTURE: CPT

## 2020-08-28 PROCEDURE — 80048 BASIC METABOLIC PNL TOTAL CA: CPT

## 2020-08-28 PROCEDURE — 6360000002 HC RX W HCPCS: Performed by: INTERNAL MEDICINE

## 2020-08-28 PROCEDURE — 99233 SBSQ HOSP IP/OBS HIGH 50: CPT | Performed by: INTERNAL MEDICINE

## 2020-08-28 PROCEDURE — 85025 COMPLETE CBC W/AUTO DIFF WBC: CPT

## 2020-08-28 PROCEDURE — 99239 HOSP IP/OBS DSCHRG MGMT >30: CPT | Performed by: INTERNAL MEDICINE

## 2020-08-28 RX ORDER — FUROSEMIDE 40 MG/1
40 TABLET ORAL DAILY
Qty: 90 TABLET | Refills: 0
Start: 2020-08-28 | End: 2020-08-28

## 2020-08-28 RX ORDER — LISINOPRIL 5 MG/1
5 TABLET ORAL DAILY
Status: DISCONTINUED | OUTPATIENT
Start: 2020-08-28 | End: 2020-08-28 | Stop reason: HOSPADM

## 2020-08-28 RX ORDER — FUROSEMIDE 40 MG/1
TABLET ORAL
Qty: 45 TABLET | Refills: 0 | Status: SHIPPED | OUTPATIENT
Start: 2020-08-28 | End: 2020-09-02

## 2020-08-28 RX ORDER — LISINOPRIL 5 MG/1
5 TABLET ORAL DAILY
Qty: 30 TABLET | Refills: 3 | Status: SHIPPED | OUTPATIENT
Start: 2020-08-29

## 2020-08-28 RX ADMIN — CEFTRIAXONE SODIUM 1 G: 1 INJECTION, POWDER, FOR SOLUTION INTRAMUSCULAR; INTRAVENOUS at 08:25

## 2020-08-28 RX ADMIN — Medication 10 ML: at 08:25

## 2020-08-28 RX ADMIN — ASPIRIN 81 MG 81 MG: 81 TABLET ORAL at 08:25

## 2020-08-28 RX ADMIN — FUROSEMIDE 40 MG: 40 TABLET ORAL at 08:25

## 2020-08-28 RX ADMIN — ESCITALOPRAM OXALATE 10 MG: 10 TABLET ORAL at 08:25

## 2020-08-28 RX ADMIN — LISINOPRIL 5 MG: 5 TABLET ORAL at 08:26

## 2020-08-28 RX ADMIN — CARVEDILOL 6.25 MG: 6.25 TABLET, FILM COATED ORAL at 08:26

## 2020-08-28 RX ADMIN — ATORVASTATIN CALCIUM 20 MG: 10 TABLET, FILM COATED ORAL at 08:25

## 2020-08-28 RX ADMIN — PANTOPRAZOLE SODIUM 40 MG: 40 TABLET, DELAYED RELEASE ORAL at 06:33

## 2020-08-28 RX ADMIN — ENOXAPARIN SODIUM 40 MG: 40 INJECTION SUBCUTANEOUS at 08:25

## 2020-08-28 NOTE — PROGRESS NOTES
Physician Progress Note      PATIENTLevan Route  CSN #:                  [id number]  :                       1931  ADMIT DATE:       2020 9:25 PM  [address] [place] DATE:  RESPONDING  PROVIDER #:        Bety Sullivan MD          QUERY TEXT:    Pt admitted with sepsis. Noted documentation possible pneumonia per   cardiology consultant. If possible, please document in progress notes and   discharge summary:    The medical record reflects the following:  Risk Factors: sepsis  Clinical Indicators: PN  Possible bronchitis. cardiology note    Possible underlying pneumonia . CXR-small bibasilar effusions and  adjacent   infiltrates representing atelectasis versus pneumonia. Treatment: Rocephin, doxycycline, steroids    Thank You Fabian Hart RN, [facility] [phone]  Options provided:  -- Pneumonia confirmed POA  -- pneumonia confirmed not POA  -- Pneumonia ruled out  -- Other - I will add my own diagnosis  -- Disagree - Not applicable / Not valid  -- Disagree - Clinically unable to determine / Unknown  -- Refer to Clinical Documentation Reviewer    PROVIDER RESPONSE TEXT:    The diagnosis of pneumonia was ruled out.     Query created by: Mason Willoughby on 2020 3:47 PM      Electronically signed by:  Bety Sullivan MD 2020 10:01 AM

## 2020-08-28 NOTE — PLAN OF CARE
Problem: Falls - Risk of:  Goal: Will remain free from falls  Description: Will remain free from falls  8/27/2020 2049 by Eliu Sidhu RN  Outcome: Ongoing  8/27/2020 1053 by Luciana Rangel RN  Outcome: Ongoing  Goal: Absence of physical injury  Description: Absence of physical injury  8/27/2020 2049 by Eliu Sidhu RN  Outcome: Ongoing  8/27/2020 1053 by Luciana Rangel RN  Outcome: Ongoing     Problem: Skin Integrity:  Goal: Will show no infection signs and symptoms  Description: Will show no infection signs and symptoms  8/27/2020 2049 by Eliu Sidhu RN  Outcome: Ongoing  8/27/2020 1053 by Luciana Rangel RN  Outcome: Ongoing  Goal: Absence of new skin breakdown  Description: Absence of new skin breakdown  8/27/2020 2049 by Eliu Sidhu RN  Outcome: Ongoing  8/27/2020 1053 by Luciana Rangel RN  Outcome: Ongoing     Problem: OXYGENATION/RESPIRATORY FUNCTION  Goal: Patient will maintain patent airway  8/27/2020 2049 by Eliu Sidhu RN  Outcome: Ongoing  8/27/2020 1053 by Luciana Rangel RN  Outcome: Ongoing  Goal: Patient will achieve/maintain normal respiratory rate/effort  Description: Respiratory rate and effort will be within normal limits for the patient  8/27/2020 2049 by Eliu Sidhu RN  Outcome: Ongoing  8/27/2020 1053 by Luciana Rangel RN  Outcome: Ongoing     Problem: HEMODYNAMIC STATUS  Goal: Patient has stable vital signs and fluid balance  8/27/2020 2049 by Eliu Sidhu RN  Outcome: Ongoing  8/27/2020 1053 by Luciana Rangel RN  Outcome: Ongoing     Problem: FLUID AND ELECTROLYTE IMBALANCE  Goal: Fluid and electrolyte balance are achieved/maintained  8/27/2020 2049 by Eliu Sidhu RN  Outcome: Ongoing  8/27/2020 1053 by Luciana Rangel RN  Outcome: Ongoing     Problem: ACTIVITY INTOLERANCE/IMPAIRED MOBILITY  Goal: Mobility/activity is maintained at optimum level for patient  8/27/2020 2049 by Eliu Sidhu RN  Outcome: Ongoing  8/27/2020 1053 by Karen Frankel RN  Outcome: Ongoing

## 2020-08-28 NOTE — DISCHARGE SUMMARY
edema or cyanosis. Distal pulses well felt  Neurological : grossly normal with dementia        Radiology (Please Review Full Report for Details)       Chest xray   Cardiomegaly and underlying congestion with small bibasilar effusions and    adjacent infiltrates representing atelectasis versus pneumonia.              ECHO 8/20    Definity contrast administered.   Left ventricular systolic function is moderately reduced with ejection   fraction estimated at 35-40 %. Anteroseptal, apical inferior and apical   septal wall hypokinesis.   All remaining wall segments appear normal .   Left ventricle size is normal.   Normal left ventricular wall thickness.   A bioprosthetic mitral valve is well seated with normal function. No evidence   of mitral regurgitation.   The left atrium is mildly dilated.   The right ventricle is normal in size with impaired function.   Systolic pulmonary artery pressure (SPAP) estimated at 48 mmHg (RA pressure   8 mmHg), consistent with mild pulmonary hypertension.        ECHO 2016   Summary   Normal left ventricle size, wall thickness and systolic function with an   estimated ejection fraction of 55%. No regional wall motion abnormalities   are seen.  Dorothye Hero is reversal of E/A inflow velocities across the mitral valve   suggesting impaired left ventricular relaxation.   Normally functioning bioprosthetic artificial valve in mitral position.   No evidence of mitral regurgitation. The maximum mitral valve pressure   gradient is 16 mmHg and the mean pressure gradient is 4.5 mmHg.   The right ventricle is mildly dilated.  Although TAPSE is normal, visually   RVEF appears reduced.        Cultures:       covid neg  resp PCR - adeno virus      Consults:    1. cardiology            Recent Labs     08/27/20  0525 08/28/20  0558   WBC 11.4* 9.6   HGB 10.7* 10.2*   HCT 35.1* 33.1*   MCV 89.4 86.9    314       Recent Labs     08/25/20  1724 08/27/20  0525 08/28/20  0558   * 139 140   K 3.6 3.7 3.5    103 103   CO2 19* 23 28   PHOS 2.7  --   --    BUN 18 25* 23*   CREATININE 0.9 0.9 0.8       CBC:  Lab Results   Component Value Date    WBC 9.6 08/28/2020    HGB 10.2 08/28/2020    HCT 33.1 08/28/2020    MCV 86.9 08/28/2020     08/28/2020    NEUTOPHILPCT 76.6 08/28/2020    LYMPHOPCT 14.9 08/28/2020    MONOPCT 7.0 08/28/2020    EOSPCT 1.0 01/12/2012    BASOPCT 0.5 08/28/2020    NEUTROABS 7.3 08/28/2020    LYMPHSABS 1.4 08/28/2020    MONOSABS 0.7 08/28/2020    EOSABS 0.1 08/28/2020    BASOSABS 0.0 08/28/2020     BNP:   Lab Results   Component Value Date     08/28/2020    K 3.5 08/28/2020    CO2 28 08/28/2020    BUN 23 08/28/2020    CREATININE 0.8 08/28/2020    CALCIUM 8.8 08/28/2020                Medication List      START taking these medications    lisinopril 5 MG tablet  Commonly known as:  PRINIVIL;ZESTRIL  Take 1 tablet by mouth daily  Start taking on:  August 29, 2020        CHANGE how you take these medications    furosemide 40 MG tablet  Commonly known as:  LASIX  Take 1 tablet by mouth daily TAKE half TABLET BY MOUTH DAILY  What changed:    · how much to take  · how to take this  · when to take this        CONTINUE taking these medications    Allegra 180 MG tablet  Generic drug:  fexofenadine     aspirin 81 MG tablet     atorvastatin 20 MG tablet  Commonly known as:  LIPITOR  TAKE ONE TABLET BY MOUTH DAILY     carvedilol 6.25 MG tablet  Commonly known as:  COREG  TAKE ONE TABLET BY MOUTH TWICE A DAY WITH MEALS     escitalopram 10 MG tablet  Commonly known as:  LEXAPRO  Take 1 tablet by mouth daily     pantoprazole 40 MG tablet  Commonly known as:  PROTONIX  TAKE ONE TABLET BY MOUTH DAILY        STOP taking these medications    nitrofurantoin (macrocrystal-monohydrate) 100 MG capsule  Commonly known as:  MACROBID           Where to Get Your Medications      These medications were sent to [facility] [address], [place] - [facility] - F [phone] [address] [facility]    Phone:  [phone]   · lisinopril 5 MG tablet     Information about where to get these medications is not yet available    Ask your nurse or doctor about these medications  · furosemide 40 MG tablet           Discharge Condition/Location: stable/home     Follow Up:  Low salt diet    F.w cardiology in 2 weeks    Increase lasix to 40 mg daily       PCP in 1 weeks      Time Spent on discharge is more than 30 minutes in the examination, evaluation, counseling and review of medications and discharge plan.       Jeffery Christie MD 8/28/2020 1:00 PM

## 2020-08-28 NOTE — DISCHARGE INSTR - COC
Continuity of Care Form    Patient Name: Fermin King   :  1931  MRN:  [id number]    Admit date:  2020  Discharge date:  ***    Code Status Order: Full Code   Advance Directives:   Advance Care Flowsheet Documentation     Date/Time Healthcare Directive Type of Healthcare Directive Copy in [address] Agent's Name Healthcare Agent's Phone Number    [phone]  Yes, patient has an advance directive for healthcare treatment  Durable power of  for health care  No, copy requested from medical records  Adult Children  [place]  [phone]    [phone]  Yes, patient has an advance directive for healthcare treatment  Durable power of  for health care; Health care treatment directive  No, copy requested from other (See comment)  Adult Children  --  --          Admitting Physician:  Bryson Paget, MD  PCP: Caleb Kern MD    Discharging Nurse: [facility] Unit/Room#: 0205/0205-02  Discharging Unit Phone Number: ***    Emergency Contact:   Extended Emergency Contact Information  Primary Emergency Contact: [address] [place] [address] Phone: [phone]  Mobile Phone: [phone]  Relation: Child  Secondary Emergency Contact: [facility] Phone: [phone]  Relation: Child    Past Surgical History:  Past Surgical History:   Procedure Laterality Date    CARDIAC SURGERY      CORONARY ARTERY BYPASS GRAFT  12    DILATION AND CURETTAGE OF UTERUS      ENDOSCOPY, COLON, DIAGNOSTIC      JOINT REPLACEMENT      MITRAL VALVE REPLACEMENT  12    porcine valve    MOUTH SURGERY      TRACHEOSTOMY TUBE PLACEMENT      UPPER GASTROINTESTINAL ENDOSCOPY  10/03/2016       Immunization History:   Immunization History   Administered Date(s) Administered    Influenza 2013    Influenza Virus Vaccine 10/01/2012, 10/07/2015    Influenza, High Dose (Fluzone 65 yrs and older) 2016, 2017, 2018, 11/27/2019    Pneumococcal Conjugate 13-valent ([id number]) 04/11/2016    Pneumococcal Polysaccharide (Ywdauwgda22) 04/07/2012    Tdap (Boostrix, Adacel) 08/26/2019    Zoster Live (Zostavax) 08/13/2014       Active Problems:  Patient Active Problem List   Diagnosis Code    CAD (coronary artery disease) I25.10    Cardiomyopathy, ischemic I25.5    S/P CABG (coronary artery bypass graft) Z95.1    S/P MVR (mitral valve replacement) Z95.2    Atrial fibrillation (HCC) I48.91    Chronic diastolic CHF (congestive heart failure) ([facility]) I50.32    Hypertension I10    Elevated troponin R79.89    Anemia D64.9    Old cerebral hemorrhage without late effect Z86.79    Pneumonia due to suspected gram-negative bacteria R17.7    Uncomplicated late onset Alzheimer's dementia ([facility]) G30.1, F02.80    Sepsis ([facility] A41.9    Flash pulmonary edema ([facility]) J81.0    Suspected COVID-19 virus infection Z20.828    Pulmonary edema with congestive heart failure ([facility]) I50.1       Isolation/Infection:   Isolation          No Isolation        Patient Infection Status     Infection Onset Added Last Indicated Last Indicated By Review Planned Expiration Resolved Resolved By    None active    Resolved    Adenovirus 08/25/20 08/26/20 08/25/20 Respiratory Panel, Molecular   08/27/20 Aime Foster RN    COVID-19 Rule Out 08/24/20 08/24/20 08/25/20 COVID-19 (Ordered)   08/25/20 Rule-Out Test Resulted          Nurse Assessment:  Last Vital Signs: BP (!) 142/74   Pulse 78   Temp 97.1 °F (36.2 °C) (Oral)   Resp 18   Ht 5' 2\" (1.575 m)   Wt 137 lb 1.6 oz (62.2 kg)   SpO2 97%   BMI 25.08 kg/m²     Last documented pain score (0-10 scale): Pain Level: 0  Last Weight:   Wt Readings from Last 1 Encounters:   08/27/20 137 lb 1.6 oz (62.2 kg)     Mental Status:  {IP PT MENTAL STATUS:11628}    IV Access:  {[facility] IV ACCESS:033231418}    Nursing Mobility/ADLs:  Walking   {[facility] XBQT:717998568}  Transfer  {[facility] MAFV:245780714}  Bathing  {MARINE CARRASCO IHLZ:511800300}  Dressing  {CHP DME VEOC:091901845}  Toileting  {CHP DME YOUZ:542343438}  Feeding  {CHP DME ZUSP:376875306}  Med Admin  {CHP DME PYXW:058614722}  Med Delivery   { VIC MED Delivery:357121246}    Wound Care Documentation and Therapy:        Elimination:  Continence:   · Bowel: {YES / IS:30044}  · Bladder: {YES / CL:71453}  Urinary Catheter: {Urinary Catheter:278661941}   Colostomy/Ileostomy/Ileal Conduit: {YES / JR:48888}       Date of Last BM: ***    Intake/Output Summary (Last 24 hours) at 2020 1608  Last data filed at 2020 1010  Gross per 24 hour   Intake 238 ml   Output 400 ml   Net -162 ml     I/O last 3 completed shifts:   In: 12 [P.O.:238]  Out: 400 [Urine:400]    Safety Concerns:     [employer] Safety Concerns:487265115}    Impairments/Disabilities:      [employer] Impairments/Disabilities:220463317}    Nutrition Therapy:  Current Nutrition Therapy:   [employer] Diet List:576746432}    Routes of Feeding: {[facility] DME Other Feedings:675429113}  Liquids: {Slp liquid thickness:64962}  Daily Fluid Restriction: {CHP DME Yes amt example:811750826}  Last Modified Barium Swallow with Video (Video Swallowing Test): {Done Not Done XNUV:230755419}    Treatments at the Time of Hospital Discharge:   Respiratory Treatments: ***  Oxygen Therapy:  {Therapy; copd oxygen:54277}  Ventilator:    { CC Vent KSNW:584170846}    Rehab Therapies: {THERAPEUTIC INTERVENTION:4910620282}  Weight Bearing Status/Restrictions: [address] [employer] Weight Bearin}  Other Medical Equipment (for information only, NOT a DME order):  {EQUIPMENT:528639343}  Other Treatments: ***    Patient's personal belongings (please select all that are sent with patient):  {P DME Belongings:926607213}    RN SIGNATURE:  {Esignature:069900796}    CASE MANAGEMENT/SOCIAL WORK SECTION    Inpatient Status Date: 20 inpt    Readmission Risk Assessment Score:  Readmission Risk              Risk of Unplanned Readmission:        0           Discharging to Facility/ Agency   · Discharging to Facility/ Agency   · [facility]    · Address:   · Phone:   · Fax:       / signature: Electronically signed by Brayden Elaine RN on 8/28/20 at 4:10 PM EDT    PHYSICIAN SECTION    Prognosis: Good    Condition at Discharge: Stable    Rehab Potential (if transferring to Rehab): {Prognosis:6391683340}    Recommended Labs or Other Treatments After Discharge: ***    Physician Certification: I certify the above information and transfer of Dominick Meneses  is necessary for the continuing treatment of the diagnosis listed and that she requires Home Care for less 30 days.      Update Admission H&P: No change in H&P    PHYSICIAN SIGNATURE:  Electronically signed by BETY Beth RN on 8/28/20 at 4:10 PM EDT

## 2020-08-28 NOTE — PROGRESS NOTES
PHOS 2.7  --   --    BUN 18 25* 23*   CREATININE 0.9 0.9 0.8     Recent Labs     08/26/20  0850   TROPONINI 0.19*       Coagulation:   Lab Results   Component Value Date    INR 1.12 08/25/2020    APTT 32.2 08/25/2020     Cardiac markers:   Lab Results   Component Value Date    CKMB 1.0 02/07/2013    CKTOTAL 167 05/12/2014    TROPONINI 0.19 08/26/2020         Lab Results   Component Value Date    ALT 10 08/25/2020    AST 27 08/25/2020    ALKPHOS 92 08/25/2020    BILITOT 0.4 08/25/2020       Lab Results   Component Value Date    INR 1.12 08/25/2020    INR 1.02 08/24/2020    INR 1.06 09/08/2018    PROTIME 13.0 08/25/2020    PROTIME 11.8 08/24/2020    PROTIME 12.1 09/08/2018       Radiology    Chest xray   Cardiomegaly and underlying congestion with small bibasilar effusions and    adjacent infiltrates representing atelectasis versus pneumonia.             ECHO 8/20    Definity contrast administered. Left ventricular systolic function is moderately reduced with ejection   fraction estimated at 35-40 %. Anteroseptal, apical inferior and apical   septal wall hypokinesis. All remaining wall segments appear normal .   Left ventricle size is normal.   Normal left ventricular wall thickness. A bioprosthetic mitral valve is well seated with normal function. No evidence   of mitral regurgitation. The left atrium is mildly dilated. The right ventricle is normal in size with impaired function. Systolic pulmonary artery pressure (SPAP) estimated at 48 mmHg (RA pressure   8 mmHg), consistent with mild pulmonary hypertension. ECHO 2016   Summary   Normal left ventricle size, wall thickness and systolic function with an   estimated ejection fraction of 55%. No regional wall motion abnormalities   are seen. There is reversal of E/A inflow velocities across the mitral valve   suggesting impaired left ventricular relaxation. Normally functioning bioprosthetic artificial valve in mitral position.    No evidence of mitral regurgitation. The maximum mitral valve pressure   gradient is 16 mmHg and the mean pressure gradient is 4.5 mmHg. The right ventricle is mildly dilated. Although TAPSE is normal, visually   RVEF appears reduced. Cultures:      covid neg  resp PCR - adeno virus    Assessment & Plan:      1. NSTEMi with CHF - sudden onset of sob with CHF , Abn EKG with anterior ischemic changes and elevated troponins       - ASA, statins, full dose lovenox, ECHo is pending  cardiology consulted  -  Pt denies any active chest pain on adm or now. - cards recommend medical mx.   - diuresis with lasix as needed  - pending ECHO     2. CAD s.p CABG with ischemic CM - continue BB, statins,  Consider ACEI     3. Possible bronchitis - on rocephin and doxy - can stop doxy given adenovirus in resp   panel . Steroids for bronchospasm given , improved, stop prednsione   IBD as needed     4.  Dementia  - at baseline      dvt prophylaxis  Dc planning ok   D/w son       Sindy Lay MD 8/28/2020 7:43 AM

## 2020-08-28 NOTE — PROGRESS NOTES
[address] Daily Progress Note      Admit Date:  8/24/2020    Subjective:  Ms. FRANKLIN [facility] is seen for cardiology follow up  Today denies chest pain, shortness of breath, edema, dizziness, palpitations and syncope. She wants to go home. Reason for Consultation/Chief Complaint: \"I have been having SOB. \"           History of Present Illness:  Quiana Cobos is a 80 y.o. patient who presented to the hospital with complaints of sudden onset of SOB chills on 8.24.20 evening. She was weak. She has cough with flegm but no chest pain. She was clammy at that time as per her son who provided history. Patient has dementia. She has history of CHF and MI( 2012) follows at the [address] [facility] cardiology group. S/p CABG  Bioprosthetic MVR left atrial appendage ligation 2012.     ROS:  12 point ROS negative in all areas as listed below except as in [place]  Constitutional, EENT, Cardiovascular, pulmonary, GI, , Musculoskeletal, skin, neurological, hematological, endocrine, Psychiatric    Past Medical History:   Diagnosis Date    Acute blood loss anemia 1/13/2012    Atrial fib/flutter, transient     Bacteremia 09/08/2018    E coli    CAD (coronary artery disease)     Cardiogenic shock ([address] 1/6/2012    CHF (congestive heart failure) ([facility])     Chronic diastolic CHF (congestive heart failure) ([address] 11/26/2014    Encephalopathy, metabolic 0/42/9166    WNL EEG and CT HEAD.      History of blood transfusion     Hypertension     MI, old 01/12    Mitral regurgitation 1/8/2012    No history of procedure 10/03/2016    no history of colonoscopy    NSTEMI (non-ST elevated myocardial infarction) ([address] 1/7/2012    Old cerebral hemorrhage without late effect 2/7/2017    Pneumonia 2/6/2013    Respiratory failure, acute ([address] 9/0/2705    Uncomplicated late onset Alzheimer's dementia ([address] 12/13/2018    Unspecified cerebral artery occlusion with cerebral infarction      Past Surgical History:   Procedure Laterality Date    CARDIAC SURGERY      CORONARY ARTERY BYPASS GRAFT  1/12/12    DILATION AND CURETTAGE OF UTERUS      ENDOSCOPY, COLON, DIAGNOSTIC      JOINT REPLACEMENT      MITRAL VALVE REPLACEMENT  1/12/12    porcine valve    MOUTH SURGERY      TRACHEOSTOMY TUBE PLACEMENT      UPPER GASTROINTESTINAL ENDOSCOPY  10/03/2016       Objective:   BP (!) 170/96   Pulse 77   Temp 97.4 °F (36.3 °C) (Oral)   Resp 16   Ht 5' 2\" (1.575 m)   Wt 137 lb 1.6 oz (62.2 kg)   SpO2 98%   BMI 25.08 kg/m²       Intake/Output Summary (Last 24 hours) at 8/28/2020 1121  Last data filed at 8/28/2020 1010  Gross per 24 hour   Intake 238 ml   Output 400 ml   Net -162 ml       TELEMETRY:   NSR 80 per min    Physical Exam:  General: No Respiratory distress, appears well developed and well nourished. Eyes:  Sclera nonicteric  Nose/Sinuses:  negative findings: nose shows no deformity, asymmetry, or inflammation, nasal mucosa normal, septum midline with no perforation or bleeding  Back:  no pain to palpation  Joint:  no active joint inflammation  Musculoskeletal:  negative  Skin:  Warm and dry  Neck:  Negative for JVD and Carotid Bruits. Chest:  Clear to auscultation, respiration easy  Cardiovascular:  RRR, S1S2 normal, no murmur, no rub or thrill.   Abdomen:  Soft normal liver and spleen  Extremities:   No edema, clubbing, cyanosis,  Neuro: intact    Medications:    lisinopril  5 mg Oral Daily    enoxaparin  40 mg Subcutaneous Daily    furosemide  40 mg Oral Daily    carvedilol  6.25 mg Oral BID WC    pantoprazole  40 mg Oral QAM AC    escitalopram  10 mg Oral Daily    cefTRIAXone (ROCEPHIN) IV  1 g Intravenous Q24H    aspirin  81 mg Oral Daily    sodium chloride flush  10 mL Intravenous 2 times per day    atorvastatin  20 mg Oral Daily       sodium chloride flush, acetaminophen **OR** acetaminophen, albuterol sulfate HFA    Lab Data:  CBC:   Recent Labs     08/27/20  0525 08/28/20  0558   WBC 11.4* 9.6   HGB 10.7* 10.2*   HCT 35.1* 33.1*   MCV 89.4 86.9    314     BMP:   Recent Labs     20  1724 20  0525 20  0558   * 139 140   K 3.6 3.7 3.5    103 103   CO2 19* 23 28   PHOS 2.7  --   --    BUN 18 25* 23*   CREATININE 0.9 0.9 0.8     LIVER PROFILE:   Recent Labs     20  1724   AST 27   ALT 10   BILITOT 0.4   ALKPHOS 92     PT/INR:   Recent Labs     20  1724   PROTIME 13.0   INR 1.12     APTT:   Recent Labs     20  1724   APTT 32.2     BNP:  No results for input(s): BNP in the last 72 hours. IMAGING:   Echo 20   Summary   Definity contrast administered. Left ventricular systolic function is moderately reduced with ejection   fraction estimated at 35-40 %. Anteroseptal, apical inferior and apical   septal wall hypokinesis. All remaining wall segments appear normal .   Left ventricle size is normal.   Normal left ventricular wall thickness. A bioprosthetic mitral valve is well seated with normal function. No evidence   of mitral regurgitation. The left atrium is mildly dilated. The right ventricle is normal in size with impaired function. Systolic pulmonary artery pressure (SPAP) estimated at 48 mmHg (RA pressure   8 mmHg), consistent with mild pulmonary hypertension. EK20  I have reviewed EKG with the following interpretation:  Impression:  Sinus tachycardia with short PRRSR' or QR pattern in V1 suggests right ventricular conduction delayPossible Inferior infarct (cited on or before 24-AUG-2020)Anterolateral infarct (cited on or before 24-AUG-2020)Abnormal ECGWhen compared with ECG of 24-AUG-2020 21:30, (unconfirmed)Premature ventricular complexes are no longer PresentConfirmed by Marcos Ge MD, [address] () on 2020 5:40:59 AM     CXR 20  Cardiomegaly and underlying congestion with small bibasilar effusions and adjacent infiltrates representing atelectasis versus pneumonia. FINDINGS: There is chronic pulmonary change.  There are bibasilar effusions.  There are adjacent bibasilar infiltrates. Nely Saul is underlying congestion.  There is no pneumothorax.  The heart is enlarged.  The upper abdomen is unremarkable. The extrathoracic soft tissues are unremarkable.      Assessment:  Acute on chronic   combined systolic/ Diastolic CHF  Consistent with NSTEMI in presence of anterior ischemia on EKG and rise and fall of troponin  S/P CABG  S/P bioprosthetic mitral valve replacement left atrial appendage ligation  Possible underlying pneumonia  Patient Active Problem List    Diagnosis Date Noted    Pulmonary edema with congestive heart failure ([facility]     Sepsis ([facility] 08/25/2020    Flash pulmonary edema ([address] 08/25/2020    Suspected COVID-19 virus infection 73/78/6995    Uncomplicated late onset Alzheimer's dementia ([facility] 12/13/2018    Pneumonia due to suspected gram-negative bacteria 09/08/2018    Old cerebral hemorrhage without late effect 02/07/2017    Elevated troponin 09/03/2016    Anemia 09/03/2016    Hypertension     Chronic diastolic CHF (congestive heart failure) ([facility] 11/26/2014    Atrial fibrillation ([facility] 01/15/2012    S/P CABG (coronary artery bypass graft) 01/12/2012    S/P MVR (mitral valve replacement) 01/12/2012    CAD (coronary artery disease) 01/08/2012    Cardiomyopathy, ischemic 01/08/2012       Plan:  Cherise Plants  Statin   Beta blockers as tolerated  Diuresis  May stop full anticoagulation  OK to discharge today discussed with Dr Telly Lawton and daughter  JOANN graysonot recommend ischemia evaluation at this time due to presence of CHF/and pneumonia  Will follow her closely   She is totally pain free stable breathing at room air. · Greater than 35 minutes spent with patient and family discussing plan of care, treatment, diagnosis, discharge medications, and follow up care.    ·     Elizabeth Estrada MD 8/28/2020 11:21 AM

## 2020-08-29 ENCOUNTER — CARE COORDINATION (OUTPATIENT)
Dept: CASE MANAGEMENT | Age: 85
End: 2020-08-29

## 2020-08-29 NOTE — CARE COORDINATION
First attempt at 24 hour discharge call, no answer, CTN left  with contact information and request for return call. CTN reached out to [facility], they do not see orders yet. CTN explained that the chart noted \"Alvaro\" would go into Epic and pull orders, she will check with \"Alvaro\". May need to call PCP to get orders if he will be following. CTN will transition to [address] for follow up.

## 2020-08-30 ENCOUNTER — FOLLOWUP TELEPHONE ENCOUNTER (OUTPATIENT)
Dept: ADMINISTRATIVE | Age: 85
End: 2020-08-30

## 2020-08-30 LAB — BLOOD CULTURE, ROUTINE: NORMAL

## 2020-08-31 ENCOUNTER — CARE COORDINATION (OUTPATIENT)
Dept: CASE MANAGEMENT | Age: 85
End: 2020-08-31

## 2020-08-31 PROCEDURE — 1111F DSCHRG MED/CURRENT MED MERGE: CPT | Performed by: INTERNAL MEDICINE

## 2020-08-31 NOTE — CARE COORDINATION
[facility] Transitions Initial Follow Up Call    Call within 2 business days of discharge: Yes    Patient: Conner Laboy Patient : 1931   MRN: [id number]  Reason for Admission: sepsis  Discharge Date: 20 RARS: Readmission Risk Score: 15      Last Discharge [facility]       Complaint Diagnosis Description Type Department Provider    20 Shortness of Breath Septicemia ([address] . .. ED to Hosp-Admission (Discharged) (ADMITTED) [address] [place] MD; [facility] - RESIDENT DRUG TREATMENT (WOMEN). .. Spoke with: Rj LopezPOA)    Facility: [facility]    Non-face-to-face services provided:  Obtained and reviewed discharge summary and/or continuity of care documents  Communication with specialists who will assume or re-assume care of the patient's system-specific problems-Yvette at Dr Bre Nichols office  Education of patient/family/caregiver/guardian to support self-management-CHF education  Assessment and support for treatment adherence and medication management-1111F completed    Challenges to be reviewed by the provider   Additional needs identified to be addressed with provider Yes  medications-please clear up instructions on furosemide dosing    COVID-19 Not Detected on 2020. Patient informed of results, if available? Yes       Method of communication with provider : chart routing    Advance Care Planning:   Does patient have an Advance Directive:  reviewed and current. Was this a readmission? No  Patient stated reason for admission: SOB  Patients top risk factors for readmission: medical condition    Care Transition Nurse (CTN) contacted the caregiver by telephone to perform post hospital discharge assessment. Verified name and  with caregiver as identifiers. Provided introduction to self, and explanation of the CTN role. CTN reviewed discharge instructions, medical action plan and red flags with caregiver who verbalized understanding.  Caregiver given an opportunity to ask questions and does not have any further questions or concerns at this time. Were discharge instructions available to patient? Yes. Reviewed appropriate site of care based on symptoms and resources available to patient including: PCP, Specialist, Home health and [employer]. The caregiver agrees to contact the PCP office for questions related to their healthcare. Medication reconciliation was performed with caregiver, who verbalizes understanding of administration of home medications. Advised obtaining a 90-day supply of all daily and as-needed medications. Discussed follow-up appointments. If no appointment was previously scheduled, appointment scheduling offered: Yes. Is follow up appointment scheduled within 7 days of discharge? Yes  Non-[facility] follow up appointment(s): NA    Plan for follow-up call in 5-7 days based on severity of symptoms and risk factors. Plan for next call: symptom management-CHF    2nd attempt - Unable to reach patient by phone. Message left stating purpose of call with contact information requesting return call on both patient and [employer] voice mails. Outreach to [facility] with [facility] to confirm referral confirmed. They reached out to PCP on Friday for verbal that MD will follow and following up again today. Patient has PCP appt Tuesday at 1110am.     Received return call from [employer]. He provided best contact number during work hours ([phone]). CTN will provide to [facility] for scheduling. Medications reviewed. (Furosemide dose clarified with chart review with Shakira Mccain at Dr Donal Toribio office and CTN will forward to OLLIE MICHAEL for written update at [address] in 2 days).      CHF education:  -weigh daily in the morning at the same time and in the same clothing  -report weight gain of >3#/day or >5#/week  -report increased SOB, edema, abd fullness, difficulty lying flat  -report palpitations, cough, difficulty urinating  -review of red/yellow/green CHF Zone Tool    He will try to get her to weigh daily and was instructed to take weight log to follow up appointments. Denies needs otherwise.        Care Transitions 24 Hour Call    Schedule Follow Up Appointment with PCP:  Completed  Do you have any ongoing symptoms?:  No  Do you have a copy of your discharge instructions?:  Yes  Do you have all of your prescriptions and are they filled?:  Yes  Have you been contacted by a [id number] Pharmacist?:  No  Have you scheduled your follow up appointment?:  Yes  How are you going to get to your appointment?:  Car - family or friend to transport  Were you discharged with any Home Care or Post Acute Services:  Yes  Post Acute Services:  Home Health (Comment: [facility])  Patient DME:  Straight cane, Walker, Shower chair, Wheelchair  Do you have support at home?:  Child  Do you feel like you have everything you need to keep you well at home?:  Yes  Are you an active caregiver in your home?:  No  Care Transitions Interventions     Other Services:   (Comment: [facility]          Follow Up  Future Appointments   Date Time Provider Jocelynn Alberts   9/2/2020 11:10 AM FERNANDA Main Int None   9/18/2020  3:00 PM MD VIBHA Velazquez   10/16/2020  8:30 AM MD Geovanni Buckner Int None       Tony Link RN

## 2020-08-31 NOTE — PROGRESS NOTES
Physician Progress Note      Rose Leigh  CSN #:                  [id number]  :                       1931  ADMIT DATE:       2020 9:25 PM  [facility] DATE:        2020 2:40 PM  RESPONDING  PROVIDER #:        Zeke Rodas MD          QUERY TEXT:    Patient admitted with sepsis in H&P then dropped. If possible, please   document in the progress notes and discharge summary if sepsis was: The medical record reflects the following:  Risk Factors: tachycardia, tachypnea  Clinical Indicators: H&P-I did not treat with IV fluid resuscitation as   recommended for sepsis because the patient was already in volume overload at   the time of presentation. WBC 18.3,  ,  RR 41,  temp 96.7. ED-Time at which sepsis was identified:   Treatment: Rocephin, cardiology consult, IV NS 500ml bolus x1 on     Thank You Aldo Villafuerte RN, [phone]  Options provided:  -- Sepsis ruled out after study  -- Sepsis treated and resolved  -- Sepsis POA confirmed after study  -- Other - I will add my own diagnosis  -- Disagree - Not applicable / Not valid  -- Disagree - Clinically unable to determine / Unknown  -- Refer to Clinical Documentation Reviewer    PROVIDER RESPONSE TEXT:    Sepsis ruled out after study.     Query created by: Bridgette Orellana on 2020 10:36 AM      Electronically signed by:  Zeke Rodas MD 2020 12:03 PM

## 2020-09-01 ENCOUNTER — TELEPHONE (OUTPATIENT)
Dept: INTERNAL MEDICINE CLINIC | Age: 85
End: 2020-09-01

## 2020-09-01 NOTE — TELEPHONE ENCOUNTER
----- Message from Sarah Sánchez MD sent at 9/1/2020  3:16 PM EDT -----  Contact: [facility]- [phone]  ok  ----- Message -----  From: Mika Crow  Sent: 9/1/2020   2:45 PM EDT  To: MD Filiberto Rodgers with [facility] called- requesting orders for home health care- skilled nursing-PT and OT- please call iesha with orders at [phone]-[facility] appt- 8-17-20-lr

## 2020-09-02 ENCOUNTER — OFFICE VISIT (OUTPATIENT)
Dept: INTERNAL MEDICINE CLINIC | Age: 85
End: 2020-09-02

## 2020-09-02 VITALS
OXYGEN SATURATION: 95 % | SYSTOLIC BLOOD PRESSURE: 104 MMHG | RESPIRATION RATE: 18 BRPM | DIASTOLIC BLOOD PRESSURE: 58 MMHG | HEIGHT: 62 IN | HEART RATE: 74 BPM | WEIGHT: 130 LBS | BODY MASS INDEX: 23.92 KG/M2

## 2020-09-02 DIAGNOSIS — Z09 HOSPITAL DISCHARGE FOLLOW-UP: ICD-10-CM

## 2020-09-02 LAB
ANION GAP SERPL CALCULATED.3IONS-SCNC: 8 MMOL/L (ref 3–16)
BUN BLDV-MCNC: 17 MG/DL (ref 7–20)
CALCIUM SERPL-MCNC: 9 MG/DL (ref 8.3–10.6)
CHLORIDE BLD-SCNC: 101 MMOL/L (ref 99–110)
CO2: 34 MMOL/L (ref 21–32)
CREAT SERPL-MCNC: 1 MG/DL (ref 0.6–1.2)
GFR AFRICAN AMERICAN: >60
GFR NON-AFRICAN AMERICAN: 52
GLUCOSE BLD-MCNC: 130 MG/DL (ref 70–99)
POTASSIUM SERPL-SCNC: 4.2 MMOL/L (ref 3.5–5.1)
SODIUM BLD-SCNC: 143 MMOL/L (ref 136–145)

## 2020-09-02 PROCEDURE — 99496 TRANSJ CARE MGMT HIGH F2F 7D: CPT | Performed by: PHYSICIAN ASSISTANT

## 2020-09-02 PROCEDURE — 1111F DSCHRG MED/CURRENT MED MERGE: CPT | Performed by: PHYSICIAN ASSISTANT

## 2020-09-02 RX ORDER — LANOLIN ALCOHOL/MO/W.PET/CERES
3 CREAM (GRAM) TOPICAL DAILY
Qty: 30 TABLET | Refills: 0 | Status: SHIPPED | OUTPATIENT
Start: 2020-09-02 | End: 2020-10-02

## 2020-09-02 RX ORDER — FUROSEMIDE 40 MG/1
40 TABLET ORAL DAILY
Qty: 30 TABLET | Refills: 0 | Status: ON HOLD
Start: 2020-09-02 | End: 2020-10-05 | Stop reason: HOSPADM

## 2020-09-02 NOTE — PROGRESS NOTES
Post-Discharge Transitional Care Management Services or Hospital Follow Up      Jonny Castillo   YOB: 1931    Date of Office Visit:  9/2/2020  Date of Hospital Admission: 8/24/20  Date of Hospital Discharge: 8/28/20  Readmission Risk Score(high >=14%. Medium >=10%):Readmission Risk Score: 15      Care management risk score Rising risk (score 2-5) and Complex Care (Scores >=6): 5     Non face to face  following discharge, date last encounter closed (first attempt may have been earlier): 8/31/2020  3:42 PM 8/31/2020  3:42 PM    Call initiated 2 business days of discharge: Yes     Patient Active Problem List   Diagnosis    CAD (coronary artery disease)    Cardiomyopathy, ischemic    S/P CABG (coronary artery bypass graft)    S/P MVR (mitral valve replacement)    Atrial fibrillation (HCC)    Chronic diastolic [facility]    Hypertension    Elevated troponin    Anemia    Old cerebral hemorrhage without late effect    Pneumonia due to suspected gram-negative bacteria    Uncomplicated late onset Alzheimer's dementia ([facility]    Sepsis ([facility]    Flash pulmonary edema ([facility]    Suspected COVID-19 virus infection    Pulmonary edema with congestive heart failure (HCC)       Allergies   Allergen Reactions    Morphine Shortness Of Breath    Warfarin Other (See Comments)    Cefuroxime      Has tolerated Ceftriaxone and Cephalexin since Cefuroxime added to allergies.  Levofloxacin [Levofloxacin]      rash    Azithromycin Nausea And Vomiting     States had N/V one hour past oral doses of this med. Medications listed as ordered at the time of discharge from [facility] Medication Instructions NORY:    Printed on:09/04/20 1009   Medication Information                      aspirin 81 MG tablet  Take 81 mg by mouth daily.              atorvastatin (LIPITOR) 20 MG tablet  TAKE ONE TABLET BY MOUTH DAILY             carvedilol (COREG) 6.25 MG tablet  TAKE ONE TABLET BY MOUTH TWICE A DAY WITH MEALS             escitalopram (LEXAPRO) 10 MG tablet  Take 1 tablet by mouth daily             fexofenadine (ALLEGRA) 180 MG tablet  Take 180 mg by mouth daily as needed. furosemide (LASIX) 40 MG tablet  Take 1 tablet by mouth daily             lisinopril (PRINIVIL;ZESTRIL) 5 MG tablet  Take 1 tablet by mouth daily             melatonin (RA MELATONIN) 3 MG TABS tablet  Take 1 tablet by mouth daily             pantoprazole (PROTONIX) 40 MG tablet  TAKE ONE TABLET BY MOUTH DAILY             traZODone (DESYREL) 50 MG tablet  Take 0.5 tablets by mouth nightly                   Medications marked \"taking\" at this time  Outpatient Medications Marked as Taking for the 9/2/20 encounter (Office Visit) with FERNANDA Jaquez   Medication Sig Dispense Refill    melatonin (RA MELATONIN) 3 MG TABS tablet Take 1 tablet by mouth daily 30 tablet 0    furosemide (LASIX) 40 MG tablet Take 1 tablet by mouth daily 30 tablet 0    lisinopril (PRINIVIL;ZESTRIL) 5 MG tablet Take 1 tablet by mouth daily 30 tablet 3    carvedilol (COREG) 6.25 MG tablet TAKE ONE TABLET BY MOUTH TWICE A DAY WITH MEALS 180 tablet 0    [DISCONTINUED] pantoprazole (PROTONIX) 40 MG tablet TAKE ONE TABLET BY MOUTH DAILY 90 tablet 0    atorvastatin (LIPITOR) 20 MG tablet TAKE ONE TABLET BY MOUTH DAILY 90 tablet 0    aspirin 81 MG tablet Take 81 mg by mouth daily.  fexofenadine (ALLEGRA) 180 MG tablet Take 180 mg by mouth daily as needed. Medications patient taking as of now reconciled against medications ordered at time of hospital discharge: Yes    Chief Complaint   Patient presents with    Follow-Up from Hospital       HPI    Inpatient course: Discharge summary reviewed- see chart. Interval history/Current status:Patient presents with family members. She reports she has been doing well and family agrees. She has had a good appetite and eating well.  She has completed her Normocephalic and atraumatic. Eyes:      Conjunctiva/sclera: Conjunctivae normal.   Neck:      Musculoskeletal: Neck supple. Cardiovascular:      Rate and Rhythm: Normal rate and regular rhythm. Pulses: Normal pulses. Heart sounds: Normal heart sounds. Pulmonary:      Effort: Pulmonary effort is normal.      Breath sounds: Normal breath sounds. Abdominal:      Palpations: Abdomen is soft. Musculoskeletal:      Right lower leg: No edema. Left lower leg: No edema. Skin:     General: Skin is warm and dry. Capillary Refill: Capillary refill takes less than 2 seconds. Neurological:      Mental Status: She is alert. Mental status is at baseline. Psychiatric:         Mood and Affect: Mood normal.         Behavior: Behavior normal.       Assessment/Plan:  1. Hospital discharge follow-up  - AR DISCHARGE MEDS RECONCILED W/ CURRENT OUTPATIENT MED LIST  - BASIC METABOLIC PANEL; Future    2. Insomnia, unspecified type  - Trazodone 25 mg QHS, can increase if needed-->dscussed with Dr. Shanna Riojas regarding her anxiety, calling out and restlessness, will continue Lexapro and add Trazodone to help with sleep and see if this helps with her restlessness throughout the day  - melatonin (RA MELATONIN) 3 MG TABS tablet; Take 1 tablet by mouth daily  Dispense: 30 tablet; Refill: 0    3. Uncomplicated late onset Alzheimer's dementia ([facility]  - At baseline   - Family supporting patient with care at home    4. Bronchitis   - Has completed Abx  - No cough or SOB, no fevers       5. CAD, NSTEMI  - was evaluated by cardiology during her admission, recommended medical management   - Started on Lisinopril, continue Coreg   - Continue Lasix 40 mg PO QD  - Has follow up next week with cardiology  - Will need repeat Echo in ~3 months   - BP is lower normal in office, but family reports her BP has not been low at home.  Will keep BP meds at current dosing but would consider lowering dose if BP on repeat evaluation is still low     Medical Decision Making: moderate complexity

## 2020-09-03 ENCOUNTER — TELEPHONE (OUTPATIENT)
Dept: INTERNAL MEDICINE CLINIC | Age: 85
End: 2020-09-03

## 2020-09-03 RX ORDER — TRAZODONE HYDROCHLORIDE 50 MG/1
25 TABLET ORAL NIGHTLY
Qty: 15 TABLET | Refills: 0 | Status: SHIPPED | OUTPATIENT
Start: 2020-09-03 | End: 2020-09-18 | Stop reason: ALTCHOICE

## 2020-09-03 NOTE — TELEPHONE ENCOUNTER
----- Message from [place]ara [place] sent at 9/3/2020  4:35 PM EDT -----  Per Rj Villalobos call in Trazodone 25 mg PO QHS for sleep #30 no refills. If the lower dose doesn't help we can increase when she comes back in to see Dr. Allie Robbins. One tablet by mouth nightly. As far as nerves/anxiety, Dr. Allie Robbins thinks if pt rests better at night it will help her during the day. He doesn't want to add anything else for anxiety out of concerns for day time sleepiness which will make her symptoms worse at night. Also, increased risk for falls ect like Eliza Vitale discussed with them in office.

## 2020-09-04 ENCOUNTER — CARE COORDINATION (OUTPATIENT)
Dept: CASE MANAGEMENT | Age: 85
End: 2020-09-04

## 2020-09-04 RX ORDER — ESCITALOPRAM OXALATE 10 MG/1
10 TABLET ORAL DAILY
Qty: 30 TABLET | Refills: 2 | Status: SHIPPED | OUTPATIENT
Start: 2020-09-04 | End: 2020-12-03

## 2020-09-04 RX ORDER — PANTOPRAZOLE SODIUM 40 MG/1
TABLET, DELAYED RELEASE ORAL
Qty: 90 TABLET | Refills: 0 | Status: SHIPPED | OUTPATIENT
Start: 2020-09-04

## 2020-09-04 ASSESSMENT — ENCOUNTER SYMPTOMS
ABDOMINAL PAIN: 0
SHORTNESS OF BREATH: 0
CHEST TIGHTNESS: 0
VOMITING: 0
COUGH: 0
NAUSEA: 0
WHEEZING: 0

## 2020-09-04 NOTE — CARE COORDINATION
[facility] Transitions Follow Up Call    2020    Patient: Quiana Cobos  Patient : 1931   MRN: [id number]  Reason for Admission: sepsis  Discharge Date: 20 RARS: Readmission Risk Score: 15         Spoke with: Michelle Martin     They had PT but no SN yet. Supposed to be there but hasn't arrived or called to tell why she was late. CTN sent message to Foster Aleman with [facility] to follow up. They have 24h caregivers present. She is in a new home with new family. She was with daughter x 8 years. She calls out all the time. Family and caregivers are unable to keep her occupied. DIL states it is anxiety. She is disappointed she asked for anti anxiety med and was declined. Reviewed 2 new meds from TCM visit - melatonin and traZODone. Michelle Martin did not know about latter. States it wasn't at pharmacy yesterday. Per review, order was signed today around 1030. CTN reviewed note from PCP:  If the lower dose doesn't help we can increase when she comes back in to see Dr. Richard Betancourt. One tablet by mouth nightly. As far as nerves/anxiety, Dr. Richard Betancourt thinks if pt rests better at night it will help her during the day. He doesn't want to add anything else for anxiety out of concerns for day time sleepiness which will make her symptoms worse at night. Also, increased risk for falls ect like Mello Nuñez discussed with them in office. Michelle Martin states she is frustrated. States Jennifer sleeps fine with Tylenol PM but she will try this. Offered resources of Alzheimer's Assn for caregiver support. States she knew about this but hasn't enlisted any resources yet. SN arrived and call was ended. Michelle Martin instructed to call if Rx not at pharmacy. Care Transitions Subsequent and Final Call    Subsequent and Final Calls  Care Transitions Interventions  Other Interventions:             Follow Up  Future Appointments   Date Time Provider Jocelynn Alberts   2020  3:00 PM MD VIBHA Jonas   10/16/2020  8:30 AM MD Marla Rocha Int None   12/4/2020  2:00 PM MD Amara Rocha Int None       Harriet Lama RN

## 2020-09-04 NOTE — TELEPHONE ENCOUNTER
----- Message from Gwendolyn Crow sent at 9/4/2020 12:52 PM EDT -----  Contact: [phone]  Jennifer needs her nerve pill sent to [place]. Waleska Dejesus. She cannot remember what it is called. She saw Dayanara Ford 2 days ago. She has appt. 10/16/20.   RONAL

## 2020-09-10 ENCOUNTER — CARE COORDINATION (OUTPATIENT)
Dept: CASE MANAGEMENT | Age: 85
End: 2020-09-10

## 2020-09-15 ENCOUNTER — CARE COORDINATION (OUTPATIENT)
Dept: CASE MANAGEMENT | Age: 85
End: 2020-09-15

## 2020-09-17 NOTE — PROGRESS NOTES
[facility] Office Note  2020     Subjective:  Ms. Kamaljit Murillo is hospital follow up of Shortness of breath. First seen by me on 2020 for NSTEMI  Comes today for post hospitalization follow up accompanied by daughter in law. HPI:   She presented to the hospital with c/o SOB, chills, cough, and weakness on 2020. She was treated for NSTEMI with acute s and d CHF. EKG with anterior ischemic changes and elevated troponin's. ECHO showed depressed EF with wall motion abn. She has a PMH of dementia, CHF, MI; , CAD; CABG , and Bioprosthetic MVR left atrial appendage ligation . Today she state that she slowly getting her strength back. She was wobbly today getting into car. She uses a walker at home. She is taking her medications as prescribed. Family stopped trazodone as it made her sleepy during the day. She tolerates rest of them well. Patient denies chest pain, sob, palpitations, dizziness or syncope. Review of Systems:  12 point ROS negative in all areas as listed below except as in [place]  Constitutional, EENT, Cardiovascular, pulmonary, GI, , Musculoskeletal, skin, neurological, hematological, endocrine, Psychiatric      Reviewed past medical history, social, and family history. Smoking none  Alcohol none  Parents  non contributory  Past Medical History:   Diagnosis Date    Acute blood loss anemia 2012    Atrial fib/flutter, transient     Bacteremia 2018    E coli    CAD (coronary artery disease)     Cardiogenic shock ([address] 2012    CHF [facility]congestive heart failure) [facility]     Chronic diastolic CHF (congestive heart failure) ([address] 2014    Encephalopathy, metabolic     WNL EEG and CT HEAD.      History of blood transfusion     Hypertension     MI, old     Mitral regurgitation 2012    No history of procedure 10/03/2016    no history of colonoscopy    NSTEMI (non-ST elevated myocardial infarction) ([address] 2012    Old cerebral hemorrhage without late effect 2/7/2017    Pneumonia 2/6/2013    Respiratory failure, acute ([facility] 8/1/8922    [facility] 12/13/2018    Unspecified cerebral artery occlusion with cerebral infarction      Past Surgical History:   Procedure Laterality Date    CARDIAC SURGERY      CORONARY ARTERY BYPASS GRAFT  1/12/12    DILATION AND CURETTAGE OF UTERUS      ENDOSCOPY, COLON, DIAGNOSTIC      JOINT REPLACEMENT      MITRAL VALVE REPLACEMENT  1/12/12    porcine valve    MOUTH SURGERY      TRACHEOSTOMY TUBE PLACEMENT      UPPER GASTROINTESTINAL ENDOSCOPY  10/03/2016       Objective:   /76   Pulse 79   Ht 5' 2\" (1.575 m)   Wt 128 lb (58.1 kg)   SpO2 97%   BMI 23.41 kg/m²     Wt Readings from Last 3 Encounters:   09/18/20 128 lb (58.1 kg)   09/02/20 130 lb (59 kg)   08/27/20 137 lb 1.6 oz (62.2 kg)       Physical Exam:  General: No Respiratory distress, appears well developed and well nourished. Eyes:  Sclera nonicteric  Nose/Sinuses:  negative findings: nose shows no deformity, asymmetry, or inflammation, nasal mucosa normal, septum midline with no perforation or bleeding  Back:  no pain to palpation  Joint:  no active joint inflammation  Musculoskeletal:  negative  Skin:  Warm and dry  Neck:  Negative for JVD and Carotid Bruits. Chest:  Clear to auscultation, respiration easy  Cardiovascular:  RRR, S1S2 normal, no murmur, no rub or thrill.   Extremities:   No edema, clubbing, cyanosis,    Neuro: intact    Medications:   Outpatient Encounter Medications as of 9/18/2020   Medication Sig Dispense Refill    pantoprazole (PROTONIX) 40 MG tablet TAKE ONE TABLET BY MOUTH DAILY 90 tablet 0    escitalopram (LEXAPRO) 10 MG tablet Take 1 tablet by mouth daily 30 tablet 2    traZODone (DESYREL) 50 MG tablet Take 0.5 tablets by mouth nightly 15 tablet 0    furosemide (LASIX) 40 MG tablet Take 1 tablet by mouth daily 30 tablet 0    carvedilol (COREG) 6.25 MG tablet TAKE ONE TABLET BY MOUTH TWICE A DAY WITH MEALS 180 tablet 0    atorvastatin (LIPITOR) 20 MG tablet TAKE ONE TABLET BY MOUTH DAILY 90 tablet 0    aspirin 81 MG tablet Take 81 mg by mouth daily.  fexofenadine (ALLEGRA) 180 MG tablet Take 180 mg by mouth daily as needed.  melatonin (RA MELATONIN) 3 MG TABS tablet Take 1 tablet by mouth daily (Patient not taking: Reported on 9/18/2020) 30 tablet 0    lisinopril (PRINIVIL;ZESTRIL) 5 MG tablet Take 1 tablet by mouth daily (Patient not taking: Reported on 9/18/2020) 30 tablet 3     No facility-administered encounter medications on file as of 9/18/2020. Lab Data:  CBC: No results for input(s): WBC, HGB, HCT, MCV, PLT in the last 72 hours. BMP: No results for input(s): NA, K, CL, CO2, PHOS, BUN, CREATININE in the last 72 hours. Invalid input(s): CA  LIVER PROFILE: No results for input(s): AST, ALT, LIPASE, BILIDIR, BILITOT, ALKPHOS in the last 72 hours. Invalid input(s): AMYLASE,  ALB  LIPID:   Lab Results   Component Value Date    CHOL 148 01/22/2020    CHOL 162 12/13/2018    CHOL 175 08/11/2017     Lab Results   Component Value Date    TRIG 114 01/22/2020    TRIG 154 (H) 12/13/2018    TRIG 141 08/11/2017     Lab Results   Component Value Date    HDL 52 01/22/2020    HDL 45 12/13/2018    HDL 52 08/11/2017     Lab Results   Component Value Date    LDLCALC 73 01/22/2020    LDLCALC 86 12/13/2018    LDLCALC 95 08/11/2017     Lab Results   Component Value Date    LABVLDL 23 01/22/2020    LABVLDL 31 12/13/2018    LABVLDL 28 08/11/2017     Lab Results   Component Value Date    CHOLHDLRATIO 3.1 07/19/2016    CHOLHDLRATIO 3.0 06/30/2015    CHOLHDLRATIO 3.5 04/10/2014     PT/INR: No results for input(s): PROTIME, INR in the last 72 hours. A1C:   Lab Results   Component Value Date    LABA1C 5.8 01/11/2012     BNP:  No results for input(s): BNP in the last 72 hours. IMAGING:   ECHO: 8/24/2020   Definity contrast administered.    Left

## 2020-09-18 ENCOUNTER — OFFICE VISIT (OUTPATIENT)
Dept: CARDIOLOGY CLINIC | Age: 85
End: 2020-09-18
Payer: MEDICARE

## 2020-09-18 VITALS
BODY MASS INDEX: 23.55 KG/M2 | HEART RATE: 79 BPM | SYSTOLIC BLOOD PRESSURE: 121 MMHG | WEIGHT: 128 LBS | HEIGHT: 62 IN | DIASTOLIC BLOOD PRESSURE: 76 MMHG | OXYGEN SATURATION: 97 %

## 2020-09-18 PROCEDURE — 99214 OFFICE O/P EST MOD 30 MIN: CPT | Performed by: INTERNAL MEDICINE

## 2020-09-18 NOTE — LETTER
[facility] Office Note  2020     Subjective:  Ms. John Schwartz is hospital follow up of Shortness of breath. First seen by me on 2020 for NSTEMI  Comes today for post hospitalization follow up accompanied by daughter in law. HPI:   She presented to the hospital with c/o SOB, chills, cough, and weakness on 2020. She was treated for NSTEMI with acute s and d CHF. EKG with anterior ischemic changes and elevated troponin's. ECHO showed depressed EF with wall motion abn. She has a PMH of dementia, CHF, MI; , CAD; CABG , and Bioprosthetic MVR left atrial appendage ligation . Today she state that she slowly getting her strength back. She was wobbly today getting into car. She uses a walker at home. She is taking her medications as prescribed. Family stopped trazodone as it made her sleepy during the day. She tolerates rest of them well. Patient denies chest pain, sob, palpitations, dizziness or syncope. Review of Systems:  12 point ROS negative in all areas as listed below except as in [place]  Constitutional, EENT, Cardiovascular, pulmonary, GI, , Musculoskeletal, skin, neurological, hematological, endocrine, Psychiatric      Reviewed past medical history, social, and family history. Smoking none  Alcohol none  Parents  non contributory  Past Medical History:   Diagnosis Date    Acute blood loss anemia 2012    Atrial fib/flutter, transient     Bacteremia 2018    E coli    CAD (coronary artery disease)     Cardiogenic shock ([facility] 2012    [facility]     Chronic diastolic CHF (congestive heart failure) ([address] 2014    Encephalopathy, metabolic 8/15/3289    WNL EEG and CT HEAD.      History of blood transfusion     Hypertension     MI, old     Mitral regurgitation 2012    No history of procedure 10/03/2016    no history of colonoscopy    NSTEMI (non-ST elevated myocardial infarction) ([facility] 2012  Old cerebral hemorrhage without late effect 2/7/2017    Pneumonia 2/6/2013    Respiratory failure, acute ([facility] 2/1/1183    [facility] 12/13/2018    Unspecified cerebral artery occlusion with cerebral infarction      Past Surgical History:   Procedure Laterality Date    CARDIAC SURGERY      CORONARY ARTERY BYPASS GRAFT  1/12/12    DILATION AND CURETTAGE OF UTERUS      ENDOSCOPY, COLON, DIAGNOSTIC      JOINT REPLACEMENT      MITRAL VALVE REPLACEMENT  1/12/12    porcine valve    MOUTH SURGERY      TRACHEOSTOMY TUBE PLACEMENT      UPPER GASTROINTESTINAL ENDOSCOPY  10/03/2016       Objective:   /76   Pulse 79   Ht 5' 2\" (1.575 m)   Wt 128 lb (58.1 kg)   SpO2 97%   BMI 23.41 kg/m²     Wt Readings from Last 3 Encounters:   09/18/20 128 lb (58.1 kg)   09/02/20 130 lb (59 kg)   08/27/20 137 lb 1.6 oz (62.2 kg)       Physical Exam:  General: No Respiratory distress, appears well developed and well nourished. Eyes:  Sclera nonicteric  Nose/Sinuses:  negative findings: nose shows no deformity, asymmetry, or inflammation, nasal mucosa normal, septum midline with no perforation or bleeding  Back:  no pain to palpation  Joint:  no active joint inflammation  Musculoskeletal:  negative  Skin:  Warm and dry  Neck:  Negative for JVD and Carotid Bruits. Chest:  Clear to auscultation, respiration easy  Cardiovascular:  RRR, S1S2 normal, no murmur, no rub or thrill.   Extremities:   No edema, clubbing, cyanosis,    Neuro: intact    Medications:   Outpatient Encounter Medications as of 9/18/2020   Medication Sig Dispense Refill    pantoprazole (PROTONIX) 40 MG tablet TAKE ONE TABLET BY MOUTH DAILY 90 tablet 0    escitalopram (LEXAPRO) 10 MG tablet Take 1 tablet by mouth daily 30 tablet 2    traZODone (DESYREL) 50 MG tablet Take 0.5 tablets by mouth nightly 15 tablet 0    furosemide (LASIX) 40 MG tablet Take 1 tablet by mouth daily 30 tablet 0  carvedilol (COREG) 6.25 MG tablet TAKE ONE TABLET BY MOUTH TWICE A DAY WITH MEALS 180 tablet 0    atorvastatin (LIPITOR) 20 MG tablet TAKE ONE TABLET BY MOUTH DAILY 90 tablet 0    aspirin 81 MG tablet Take 81 mg by mouth daily.  fexofenadine (ALLEGRA) 180 MG tablet Take 180 mg by mouth daily as needed.  melatonin (RA MELATONIN) 3 MG TABS tablet Take 1 tablet by mouth daily (Patient not taking: Reported on 9/18/2020) 30 tablet 0    lisinopril (PRINIVIL;ZESTRIL) 5 MG tablet Take 1 tablet by mouth daily (Patient not taking: Reported on 9/18/2020) 30 tablet 3     No facility-administered encounter medications on file as of 9/18/2020. Lab Data:  CBC: No results for input(s): WBC, HGB, HCT, MCV, PLT in the last 72 hours. BMP: No results for input(s): NA, K, CL, CO2, PHOS, BUN, CREATININE in the last 72 hours. Invalid input(s): CA  LIVER PROFILE: No results for input(s): AST, ALT, LIPASE, BILIDIR, BILITOT, ALKPHOS in the last 72 hours. Invalid input(s): AMYLASE,  ALB  LIPID:   Lab Results   Component Value Date    CHOL 148 01/22/2020    CHOL 162 12/13/2018    CHOL 175 08/11/2017     Lab Results   Component Value Date    TRIG 114 01/22/2020    TRIG 154 (H) 12/13/2018    TRIG 141 08/11/2017     Lab Results   Component Value Date    HDL 52 01/22/2020    HDL 45 12/13/2018    HDL 52 08/11/2017     Lab Results   Component Value Date    LDLCALC 73 01/22/2020    LDLCALC 86 12/13/2018    LDLCALC 95 08/11/2017     Lab Results   Component Value Date    LABVLDL 23 01/22/2020    LABVLDL 31 12/13/2018    LABVLDL 28 08/11/2017     Lab Results   Component Value Date    CHOLHDLRATIO 3.1 07/19/2016    CHOLHDLRATIO 3.0 06/30/2015    CHOLHDLRATIO 3.5 04/10/2014     PT/INR: No results for input(s): PROTIME, INR in the last 72 hours. A1C:   Lab Results   Component Value Date    LABA1C 5.8 01/11/2012     BNP:  No results for input(s): BNP in the last 72 hours.     IMAGING:   ECHO: 8/24/2020 Definity contrast administered. Left ventricular systolic function is moderately reduced with ejection   fraction estimated at 35-40 %. Anteroseptal, apical inferior and apical   septal wall hypokinesis. All remaining wall segments appear normal .   Left ventricle size is normal.   Normal left ventricular wall thickness. A bioprosthetic mitral valve is well seated with normal function. No evidence   of mitral regurgitation. The left atrium is mildly dilated. The right ventricle is normal in size with impaired function. Systolic pulmonary artery pressure (SPAP) estimated at 48 mmHg (RA pressure   8 mmHg), consistent with mild pulmonary hypertension. [id number]  Normal sinus rhythmInferior infarct (cited on or before 12-MAY-2014)T wave abnormality, consider anterior ischemiaProlonged QT     Assessment:  1. NSTEMI  2. CAD s/p CABG  3. ICM  4. MVR  5. Dementia        Plan:  1. Continue current course  2. Follow up 6 months or as needed    QUALITY MEASURES  1. Tobacco Cessation Counseling: NA  2. Retake of BP if >140/90:   NA  3. Documentation to PCP/referring for new patient:  Sent to PCP at close of office visit  4. CAD patient on anti-platelet: Yes  5. CAD patient on STATIN therapy:  Yes  6. Patient with CHF and aFib on anticoagulation:  NA     This note was scribed in the presence of Windy Ortega MD by Jenna Santiago RN. I, Dr. Windy Ortega, personally performed the services described in this documentation, as scribed by the above signed scribe in my presence. It is both accurate and complete to my knowledge. I agree with the details independently gathered by the clinical support staff, while the remaining scribed note accurately describes my personal service to the patient.       Phillip De Los Santos MD  [facility]  [phone] [facility] office  [phone] [facility]

## 2020-09-22 ENCOUNTER — CARE COORDINATION (OUTPATIENT)
Dept: CASE MANAGEMENT | Age: 85
End: 2020-09-22

## 2020-09-22 NOTE — CARE COORDINATION
[facility] Transitions FINAL Call    2020    Patient: Kenneth Schneider  Patient : [id number]   MRN: [id number]  Reason for Admission: NSTEMI with sCHF   Discharge Date: 20 RARS: Readmission Risk Score: 15         3rd/FINAL attempt - Unable to reach patient by phone. Message left stating purpose of call with contact information requesting return call. No further CTN outreach scheduled at this time. Episode resolved. Unable to reach.          Follow Up  Future Appointments   Date Time Provider Jocelynn Alberts   10/16/2020  8:30 AM MD Errol [facility] None   2020  2:00 PM MD Temitope [facility] None       Kadi Mtz RN

## 2020-09-30 ENCOUNTER — NURSE ONLY (OUTPATIENT)
Dept: INTERNAL MEDICINE CLINIC | Age: 85
End: 2020-09-30

## 2020-09-30 PROCEDURE — 81002 URINALYSIS NONAUTO W/O SCOPE: CPT | Performed by: INTERNAL MEDICINE

## 2020-09-30 RX ORDER — NITROFURANTOIN 25; 75 MG/1; MG/1
100 CAPSULE ORAL 2 TIMES DAILY
Qty: 14 CAPSULE | Refills: 0 | Status: SHIPPED | OUTPATIENT
Start: 2020-09-30 | End: 2020-10-02

## 2020-09-30 NOTE — PROGRESS NOTES
Per Dr Anna Stack, daughter informed that he is prescribing her Macrobid bid for 7 days and that we are sending urine for culture

## 2020-10-01 ENCOUNTER — APPOINTMENT (OUTPATIENT)
Dept: GENERAL RADIOLOGY | Age: 85
DRG: 871 | End: 2020-10-01
Payer: MEDICARE

## 2020-10-01 ENCOUNTER — HOSPITAL ENCOUNTER (INPATIENT)
Age: 85
LOS: 3 days | Discharge: HOME | DRG: 871 | End: 2020-10-05
Attending: EMERGENCY MEDICINE | Admitting: INTERNAL MEDICINE
Payer: MEDICARE

## 2020-10-01 LAB
A/G RATIO: 1 (ref 1.1–2.2)
ALBUMIN SERPL-MCNC: 3.7 G/DL (ref 3.4–5)
ALP BLD-CCNC: 92 U/L (ref 40–129)
ALT SERPL-CCNC: 9 U/L (ref 10–40)
ANION GAP SERPL CALCULATED.3IONS-SCNC: 10 MMOL/L (ref 3–16)
AST SERPL-CCNC: 21 U/L (ref 15–37)
BASE EXCESS VENOUS: 1.5 MMOL/L (ref -3–3)
BASOPHILS ABSOLUTE: 0.2 K/UL (ref 0–0.2)
BASOPHILS RELATIVE PERCENT: 0.9 %
BILIRUB SERPL-MCNC: 0.5 MG/DL (ref 0–1)
BUN BLDV-MCNC: 19 MG/DL (ref 7–20)
CALCIUM SERPL-MCNC: 9.4 MG/DL (ref 8.3–10.6)
CARBOXYHEMOGLOBIN: 1.9 % (ref 0–1.5)
CHLORIDE BLD-SCNC: 99 MMOL/L (ref 99–110)
CO2: 27 MMOL/L (ref 21–32)
CREAT SERPL-MCNC: 1 MG/DL (ref 0.6–1.2)
EOSINOPHILS ABSOLUTE: 0.2 K/UL (ref 0–0.6)
EOSINOPHILS RELATIVE PERCENT: 1.1 %
GFR AFRICAN AMERICAN: >60
GFR NON-AFRICAN AMERICAN: 52
GLOBULIN: 3.8 G/DL
GLUCOSE BLD-MCNC: 148 MG/DL (ref 70–99)
HCO3 VENOUS: 26.6 MMOL/L (ref 23–29)
HCT VFR BLD CALC: 35.2 % (ref 36–48)
HEMOGLOBIN: 11.1 G/DL (ref 12–16)
LACTIC ACID, SEPSIS: 2 MMOL/L (ref 0.4–1.9)
LYMPHOCYTES ABSOLUTE: 0.6 K/UL (ref 1–5.1)
LYMPHOCYTES RELATIVE PERCENT: 3.3 %
MCH RBC QN AUTO: 27.6 PG (ref 26–34)
MCHC RBC AUTO-ENTMCNC: 31.6 G/DL (ref 31–36)
MCV RBC AUTO: 87.3 FL (ref 80–100)
METHEMOGLOBIN VENOUS: 0.3 %
MONOCYTES ABSOLUTE: 1.1 K/UL (ref 0–1.3)
MONOCYTES RELATIVE PERCENT: 5.7 %
NEUTROPHILS ABSOLUTE: 16.4 K/UL (ref 1.7–7.7)
NEUTROPHILS RELATIVE PERCENT: 89 %
O2 CONTENT, VEN: 15 VOL %
O2 SAT, VEN: 86 %
O2 THERAPY: ABNORMAL
PCO2, VEN: 43.7 MMHG (ref 40–50)
PDW BLD-RTO: 16.3 % (ref 12.4–15.4)
PH VENOUS: 7.4 (ref 7.35–7.45)
PLATELET # BLD: 257 K/UL (ref 135–450)
PMV BLD AUTO: 8 FL (ref 5–10.5)
PO2, VEN: 50.5 MMHG (ref 25–40)
POTASSIUM REFLEX MAGNESIUM: 4 MMOL/L (ref 3.5–5.1)
PRO-BNP: 5957 PG/ML (ref 0–449)
RBC # BLD: 4.03 M/UL (ref 4–5.2)
SODIUM BLD-SCNC: 136 MMOL/L (ref 136–145)
TCO2 CALC VENOUS: 28 MMOL/L
TOTAL PROTEIN: 7.5 G/DL (ref 6.4–8.2)
TROPONIN: <0.01 NG/ML
URINE CULTURE, ROUTINE: NORMAL
WBC # BLD: 18.5 K/UL (ref 4–11)

## 2020-10-01 PROCEDURE — 84145 PROCALCITONIN (PCT): CPT

## 2020-10-01 PROCEDURE — 84484 ASSAY OF TROPONIN QUANT: CPT

## 2020-10-01 PROCEDURE — 87040 BLOOD CULTURE FOR BACTERIA: CPT

## 2020-10-01 PROCEDURE — 83605 ASSAY OF LACTIC ACID: CPT

## 2020-10-01 PROCEDURE — 93005 ELECTROCARDIOGRAM TRACING: CPT | Performed by: EMERGENCY MEDICINE

## 2020-10-01 PROCEDURE — 99285 EMERGENCY DEPT VISIT HI MDM: CPT

## 2020-10-01 PROCEDURE — 82803 BLOOD GASES ANY COMBINATION: CPT

## 2020-10-01 PROCEDURE — 85025 COMPLETE CBC W/AUTO DIFF WBC: CPT

## 2020-10-01 PROCEDURE — 83880 ASSAY OF NATRIURETIC PEPTIDE: CPT

## 2020-10-01 PROCEDURE — 80053 COMPREHEN METABOLIC PANEL: CPT

## 2020-10-01 PROCEDURE — 71045 X-RAY EXAM CHEST 1 VIEW: CPT

## 2020-10-02 ENCOUNTER — APPOINTMENT (OUTPATIENT)
Dept: CT IMAGING | Age: 85
DRG: 871 | End: 2020-10-02
Payer: MEDICARE

## 2020-10-02 PROBLEM — R50.9 FEBRILE ILLNESS: Status: ACTIVE | Noted: 2020-10-02

## 2020-10-02 LAB
BILIRUBIN URINE: NEGATIVE
BLOOD, URINE: NEGATIVE
CLARITY: CLEAR
COLOR: YELLOW
EKG ATRIAL RATE: 93 BPM
EKG DIAGNOSIS: NORMAL
EKG P AXIS: -3 DEGREES
EKG P-R INTERVAL: 110 MS
EKG Q-T INTERVAL: 380 MS
EKG QRS DURATION: 84 MS
EKG QTC CALCULATION (BAZETT): 472 MS
EKG R AXIS: 2 DEGREES
EKG T AXIS: 37 DEGREES
EKG VENTRICULAR RATE: 93 BPM
GLUCOSE URINE: NEGATIVE MG/DL
KETONES, URINE: NEGATIVE MG/DL
LACTIC ACID, SEPSIS: 2.3 MMOL/L (ref 0.4–1.9)
LEUKOCYTE ESTERASE, URINE: NEGATIVE
MICROSCOPIC EXAMINATION: NORMAL
NITRITE, URINE: NEGATIVE
PH UA: 5.5 (ref 5–8)
PROCALCITONIN: 0.33 NG/ML (ref 0–0.15)
PROTEIN UA: NEGATIVE MG/DL
RAPID INFLUENZA  B AGN: NEGATIVE
RAPID INFLUENZA A AGN: NEGATIVE
SARS-COV-2, NAAT: NOT DETECTED
SARS-COV-2, PCR: NOT DETECTED
SPECIFIC GRAVITY UA: 1.01 (ref 1–1.03)
URINE TYPE: NORMAL
UROBILINOGEN, URINE: 0.2 E.U./DL

## 2020-10-02 PROCEDURE — 96367 TX/PROPH/DG ADDL SEQ IV INF: CPT

## 2020-10-02 PROCEDURE — 36415 COLL VENOUS BLD VENIPUNCTURE: CPT

## 2020-10-02 PROCEDURE — 83605 ASSAY OF LACTIC ACID: CPT

## 2020-10-02 PROCEDURE — 81003 URINALYSIS AUTO W/O SCOPE: CPT

## 2020-10-02 PROCEDURE — U0002 COVID-19 LAB TEST NON-CDC: HCPCS

## 2020-10-02 PROCEDURE — 2000000000 HC ICU R&B

## 2020-10-02 PROCEDURE — 96365 THER/PROPH/DIAG IV INF INIT: CPT

## 2020-10-02 PROCEDURE — 6360000002 HC RX W HCPCS: Performed by: INTERNAL MEDICINE

## 2020-10-02 PROCEDURE — 6370000000 HC RX 637 (ALT 250 FOR IP): Performed by: INTERNAL MEDICINE

## 2020-10-02 PROCEDURE — 2580000003 HC RX 258: Performed by: INTERNAL MEDICINE

## 2020-10-02 PROCEDURE — 6360000002 HC RX W HCPCS: Performed by: EMERGENCY MEDICINE

## 2020-10-02 PROCEDURE — 87804 INFLUENZA ASSAY W/OPTIC: CPT

## 2020-10-02 PROCEDURE — 2580000003 HC RX 258: Performed by: EMERGENCY MEDICINE

## 2020-10-02 PROCEDURE — 87040 BLOOD CULTURE FOR BACTERIA: CPT

## 2020-10-02 PROCEDURE — 71250 CT THORAX DX C-: CPT

## 2020-10-02 PROCEDURE — 2580000003 HC RX 258: Performed by: PHYSICIAN ASSISTANT

## 2020-10-02 PROCEDURE — 93010 ELECTROCARDIOGRAM REPORT: CPT | Performed by: INTERNAL MEDICINE

## 2020-10-02 PROCEDURE — 96375 TX/PRO/DX INJ NEW DRUG ADDON: CPT

## 2020-10-02 PROCEDURE — 6370000000 HC RX 637 (ALT 250 FOR IP): Performed by: EMERGENCY MEDICINE

## 2020-10-02 PROCEDURE — 87086 URINE CULTURE/COLONY COUNT: CPT

## 2020-10-02 PROCEDURE — 96366 THER/PROPH/DIAG IV INF ADDON: CPT

## 2020-10-02 PROCEDURE — U0003 INFECTIOUS AGENT DETECTION BY NUCLEIC ACID (DNA OR RNA); SEVERE ACUTE RESPIRATORY SYNDROME CORONAVIRUS 2 (SARS-COV-2) (CORONAVIRUS DISEASE [COVID-19]), AMPLIFIED PROBE TECHNIQUE, MAKING USE OF HIGH THROUGHPUT TECHNOLOGIES AS DESCRIBED BY CMS-2020-01-R: HCPCS

## 2020-10-02 RX ORDER — ATORVASTATIN CALCIUM 10 MG/1
20 TABLET, FILM COATED ORAL DAILY
Status: DISCONTINUED | OUTPATIENT
Start: 2020-10-02 | End: 2020-10-05 | Stop reason: HOSPADM

## 2020-10-02 RX ORDER — LANOLIN ALCOHOL/MO/W.PET/CERES
3 CREAM (GRAM) TOPICAL NIGHTLY
Status: DISCONTINUED | OUTPATIENT
Start: 2020-10-02 | End: 2020-10-05 | Stop reason: HOSPADM

## 2020-10-02 RX ORDER — SODIUM CHLORIDE 0.9 % (FLUSH) 0.9 %
10 SYRINGE (ML) INJECTION PRN
Status: DISCONTINUED | OUTPATIENT
Start: 2020-10-02 | End: 2020-10-05 | Stop reason: HOSPADM

## 2020-10-02 RX ORDER — SODIUM CHLORIDE 9 MG/ML
INJECTION, SOLUTION INTRAVENOUS CONTINUOUS
Status: DISCONTINUED | OUTPATIENT
Start: 2020-10-02 | End: 2020-10-02 | Stop reason: SDUPTHER

## 2020-10-02 RX ORDER — FUROSEMIDE 10 MG/ML
40 INJECTION INTRAMUSCULAR; INTRAVENOUS ONCE
Status: COMPLETED | OUTPATIENT
Start: 2020-10-02 | End: 2020-10-02

## 2020-10-02 RX ORDER — ESCITALOPRAM OXALATE 10 MG/1
10 TABLET ORAL DAILY
Status: DISCONTINUED | OUTPATIENT
Start: 2020-10-02 | End: 2020-10-05 | Stop reason: HOSPADM

## 2020-10-02 RX ORDER — ACETAMINOPHEN 325 MG/1
650 TABLET ORAL ONCE
Status: COMPLETED | OUTPATIENT
Start: 2020-10-02 | End: 2020-10-02

## 2020-10-02 RX ORDER — SODIUM CHLORIDE 0.9 % (FLUSH) 0.9 %
10 SYRINGE (ML) INJECTION EVERY 12 HOURS SCHEDULED
Status: DISCONTINUED | OUTPATIENT
Start: 2020-10-02 | End: 2020-10-05 | Stop reason: HOSPADM

## 2020-10-02 RX ORDER — ACETAMINOPHEN 325 MG/1
650 TABLET ORAL EVERY 6 HOURS PRN
Status: DISCONTINUED | OUTPATIENT
Start: 2020-10-02 | End: 2020-10-05 | Stop reason: HOSPADM

## 2020-10-02 RX ORDER — CARVEDILOL 6.25 MG/1
6.25 TABLET ORAL 2 TIMES DAILY WITH MEALS
Status: DISCONTINUED | OUTPATIENT
Start: 2020-10-02 | End: 2020-10-03

## 2020-10-02 RX ORDER — 0.9 % SODIUM CHLORIDE 0.9 %
250 INTRAVENOUS SOLUTION INTRAVENOUS ONCE
Status: COMPLETED | OUTPATIENT
Start: 2020-10-02 | End: 2020-10-02

## 2020-10-02 RX ORDER — FUROSEMIDE 40 MG/1
40 TABLET ORAL DAILY
Status: DISCONTINUED | OUTPATIENT
Start: 2020-10-02 | End: 2020-10-03

## 2020-10-02 RX ORDER — ASPIRIN 81 MG/1
81 TABLET, CHEWABLE ORAL DAILY
Status: DISCONTINUED | OUTPATIENT
Start: 2020-10-02 | End: 2020-10-05 | Stop reason: HOSPADM

## 2020-10-02 RX ORDER — PANTOPRAZOLE SODIUM 40 MG/1
40 TABLET, DELAYED RELEASE ORAL DAILY
Status: DISCONTINUED | OUTPATIENT
Start: 2020-10-02 | End: 2020-10-05 | Stop reason: HOSPADM

## 2020-10-02 RX ORDER — SODIUM CHLORIDE 9 MG/ML
INJECTION, SOLUTION INTRAVENOUS CONTINUOUS
Status: DISCONTINUED | OUTPATIENT
Start: 2020-10-02 | End: 2020-10-04

## 2020-10-02 RX ORDER — ACETAMINOPHEN 650 MG/1
650 SUPPOSITORY RECTAL EVERY 6 HOURS PRN
Status: DISCONTINUED | OUTPATIENT
Start: 2020-10-02 | End: 2020-10-05 | Stop reason: HOSPADM

## 2020-10-02 RX ADMIN — FUROSEMIDE 40 MG: 10 INJECTION, SOLUTION INTRAMUSCULAR; INTRAVENOUS at 00:37

## 2020-10-02 RX ADMIN — SODIUM CHLORIDE 250 ML: 9 INJECTION, SOLUTION INTRAVENOUS at 14:53

## 2020-10-02 RX ADMIN — ATORVASTATIN CALCIUM 20 MG: 10 TABLET, FILM COATED ORAL at 12:24

## 2020-10-02 RX ADMIN — ASPIRIN 81 MG: 81 TABLET, CHEWABLE ORAL at 12:24

## 2020-10-02 RX ADMIN — CEFEPIME 2 G: 2 INJECTION, POWDER, FOR SOLUTION INTRAVENOUS at 00:37

## 2020-10-02 RX ADMIN — CEFEPIME 2 G: 2 INJECTION, POWDER, FOR SOLUTION INTRAVENOUS at 23:54

## 2020-10-02 RX ADMIN — SODIUM CHLORIDE: 9 INJECTION, SOLUTION INTRAVENOUS at 21:28

## 2020-10-02 RX ADMIN — Medication 10 ML: at 21:33

## 2020-10-02 RX ADMIN — CEFEPIME 2 G: 2 INJECTION, POWDER, FOR SOLUTION INTRAVENOUS at 12:51

## 2020-10-02 RX ADMIN — VANCOMYCIN HYDROCHLORIDE 1.5 G: 10 INJECTION, POWDER, LYOPHILIZED, FOR SOLUTION INTRAVENOUS at 01:41

## 2020-10-02 RX ADMIN — SODIUM CHLORIDE: 9 INJECTION, SOLUTION INTRAVENOUS at 18:20

## 2020-10-02 RX ADMIN — FUROSEMIDE 40 MG: 40 TABLET ORAL at 12:24

## 2020-10-02 RX ADMIN — ACETAMINOPHEN 650 MG: 325 TABLET ORAL at 12:29

## 2020-10-02 RX ADMIN — MELATONIN 3 MG ORAL TABLET 3 MG: 3 TABLET ORAL at 21:33

## 2020-10-02 RX ADMIN — CARVEDILOL 6.25 MG: 6.25 TABLET, FILM COATED ORAL at 12:24

## 2020-10-02 RX ADMIN — ACETAMINOPHEN 650 MG: 325 TABLET ORAL at 00:37

## 2020-10-02 RX ADMIN — ENOXAPARIN SODIUM 40 MG: 40 INJECTION SUBCUTANEOUS at 12:51

## 2020-10-02 RX ADMIN — ESCITALOPRAM OXALATE 10 MG: 10 TABLET ORAL at 12:24

## 2020-10-02 ASSESSMENT — ENCOUNTER SYMPTOMS
CHEST TIGHTNESS: 0
RHINORRHEA: 0
COUGH: 0
STRIDOR: 0
DIARRHEA: 0
SORE THROAT: 0
SHORTNESS OF BREATH: 0
ABDOMINAL PAIN: 0
VOMITING: 1
WHEEZING: 0
NAUSEA: 1

## 2020-10-02 ASSESSMENT — PAIN SCALES - GENERAL
PAINLEVEL_OUTOF10: 8
PAINLEVEL_OUTOF10: 0
PAINLEVEL_OUTOF10: 0

## 2020-10-02 NOTE — ED TRIAGE NOTES
Pt presents to ED with complain of fever. Recent dx of pneumonia as well as starting Macrobid yesterday for a UTI. Pt has advanced dementia but son is at bedside. Reports that she began feeling SOB and running a fever this evening. Pt does not wear O2 at home and was 88% on RA.  Placed on [address] and is now 100%

## 2020-10-02 NOTE — H&P
Hospital Medicine History & Physical      PCP: Craig Dial MD    Date of Admission: 10/1/2020    Date of Service: Pt seen/examined on 10/2/2020    Chief Complaint:    Chief Complaint   Patient presents with    Fever         History Of Present Illness: The patient is a 80 y.o. female with a PMH of CAD s/p CABG, Atrial Fibrillation, HTN, Alzheimer's Dementia, S/p MVR, Chronic Diastolic CHF, hx of CVA who presented to [facility] ED with complaint of fevers with associated SOB and wheezing, nausea and vomiting. Pt is a poor historian d/t a history of dementia - all hx was obtained from son. Pt has had N/V without abdominal pain and fevers >100 at home. She does have a hx of CHF and exposure to second hand smoke. She is a never smoker. Family denies any covid exposures, LE swelling, orthopnea, weight gain, reported chest pain or palpitations, flank pain, wounds or concerns for skin infections. She has been generally more week over the last couple of days. Of note, pt was started on Macrobid for a presumed UTI by PCP on 9/30/2020. Work up in the ED showed: fever of 102.1, increased RR, tachypnea and low BPs. Pt on room air. WBC was 18.5. Lactic acid of 2. PCT 0.33. ProBNP: 5957, trop neg. Flu was negative. COVID test neg. UA negative. CT chest showed a small left pleural effusion w/o acute cardiopulmonary disease. Past Medical History:        Diagnosis Date    Acute blood loss anemia 1/13/2012    Atrial fib/flutter, transient     Bacteremia 09/08/2018    E coli    CAD (coronary artery disease)     Cardiogenic [facility] ([facility] 1/6/2012    [facility]     Chronic diastolic CHF (congestive heart failure) ([facility] 11/26/2014    Encephalopathy, metabolic 8/58/4303    WNL EEG and CT HEAD.      History of blood transfusion     Hypertension     MI, old 01/12    Mitral regurgitation 1/8/2012    No history of procedure 10/03/2016    no history of colonoscopy    [facility]7/[facility]    Old cerebral hemorrhage without late effect 2/7/2017    Pneumonia 2/6/2013    Respiratory failure, acute ([facility] 6/5/6093    Uncomplicated late onset [facility]'s dementia [facility] 12/13/2018    Unspecified cerebral artery occlusion with cerebral infarction        Past Surgical History:        Procedure Laterality Date    CARDIAC SURGERY      CORONARY ARTERY BYPASS GRAFT  1/12/12    DILATION AND CURETTAGE OF UTERUS      ENDOSCOPY, COLON, DIAGNOSTIC      JOINT REPLACEMENT      MITRAL VALVE REPLACEMENT  1/12/12    porcine valve    MOUTH SURGERY      TRACHEOSTOMY TUBE PLACEMENT      UPPER GASTROINTESTINAL ENDOSCOPY  10/03/2016       Medications Prior to Admission:    Prior to Admission medications    Medication Sig Start Date End Date Taking? Authorizing Provider   pantoprazole (PROTONIX) 40 MG tablet TAKE ONE TABLET BY MOUTH DAILY 9/4/20   Samira Duarte MD   escitalopram (LEXAPRO) 10 MG tablet Take 1 tablet by mouth daily 9/4/20 12/3/20  Samira Duarte MD   melatonin (RA MELATONIN) 3 MG TABS tablet Take 1 tablet by mouth daily  Patient not taking: Reported on 9/18/2020 9/2/20 10/2/20  FERNANDA Walters   furosemide (LASIX) 40 MG tablet Take 1 tablet by mouth daily 9/2/20 10/2/20  FERNANDA Walters   lisinopril (PRINIVIL;ZESTRIL) 5 MG tablet Take 1 tablet by mouth daily  Patient not taking: Reported on 9/18/2020 8/29/20   Magui Kwan MD   carvedilol (COREG) 6.25 MG tablet TAKE ONE TABLET BY MOUTH TWICE A DAY WITH MEALS 7/6/20   Samira Duarte MD   atorvastatin (LIPITOR) 20 MG tablet TAKE ONE TABLET BY MOUTH DAILY 4/16/20   Samira Duarte MD   aspirin 81 MG tablet Take 81 mg by mouth daily. Historical Provider, MD   fexofenadine (ALLEGRA) 180 MG tablet Take 180 mg by mouth daily as needed. Historical Provider, MD       Allergies:  Morphine; Warfarin; Cefuroxime;  Levofloxacin [levofloxacin]; and --  5.5   CLARITYU  --  Clear   SPECGRAV  --  1.010   LEUKOCYTESUR large Negative   UROBILINOGEN  --  0.2   BILIRUBINUR neg Negative   BLOODU trace Negative   GLUCOSEU neg Negative        CARDIAC ENZYMES  Recent Labs     10/01/20  2231   TROPONINI <0.01       U/A:    Lab Results   Component Value Date    NITRITE neg 09/30/2020    COLORU Yellow 10/02/2020    WBCUA 10-20 01/17/2019    RBCUA 3-5 01/17/2019    MUCUS 1+ 09/02/2016    BACTERIA 1+ 01/17/2019    CLARITYU Clear 10/02/2020    SPECGRAV 1.010 10/02/2020    LEUKOCYTESUR Negative 10/02/2020    BLOODU Negative 10/02/2020    GLUCOSEU Negative 10/02/2020    GLUCOSEU NEGATIVE 01/10/2012       ABG    Lab Results   Component Value Date    HWG5FYP 27.7 01/24/2012    BEART 5.2 01/24/2012    C1HBHTFK 96.5 01/24/2012    PHART 7.548 01/24/2012    THGBART 9.8 01/24/2012    YBR6SUB 32.5 01/24/2012    PO2ART 74.0 01/24/2012    UWB2EMM 28.7 01/24/2012     CULTURES    Blood cx x2: pending    Rapid Influenza A/B: negative     SARS-CoV-2: not detected    EKG:  I have reviewed the EKG with the following interpretation:   10/2/2020  Sinus rhythm with short [place] rate of 93  Possible Inferior infarct , age undetermined  Abnormal ECG     RADIOLOGY    CT CHEST WO CONTRAST   Final Result   No acute cardiopulmonary abnormality. Small left pleural effusion. Cholelithiasis. XR CHEST PORTABLE   Final Result   COPD, with no acute infiltrate identified. Resolved bibasilar infiltrates   when compared to the previous study. Pertinent previous results reviewed     TTE 8/24/2020  Summary   Definity contrast administered. Left ventricular systolic function is moderately reduced with ejection   fraction estimated at 35-40 %. Anteroseptal, apical inferior and apical   septal wall hypokinesis. All remaining wall segments appear normal .   Left ventricle size is normal.   Normal left ventricular wall thickness.    A bioprosthetic mitral valve is well seated with normal function. No evidence   of mitral regurgitation. The left atrium is mildly dilated. The right ventricle is normal in size with impaired function. Systolic pulmonary artery pressure (SPAP) estimated at 48 mmHg (RA pressure   8 mmHg), consistent with mild pulmonary hypertension.     ASSESSMENT/PLAN:    Severe Sepsis  - source unclear, CT chest showed small pleural effusion, UA neg  - with fevers, tachypnea, tachycardia, leukocytosis, hypotension and lactic acidosis  - Admit to PCU, tele  - flu and covid negative, check blood cx x2; PCT 0.33  - add molecular respiratory panel, strep pneumo and urine legionella  - trend LA and WBC  - abx: Vanc and Cefepime D#1, PRN Tylenol; given fluid bolus and started on IVF  - check echo    Recent concern for UTI  - as pt was acting \"off\" and family reports she acts this way when she has a UTI  - was started on Macrobid by PCP  - urine cx <50,000 CFU/mL mixed skin/urogenital zachary    Paroxysmal Atrial Fibrillation  - currently in sinus  - on ASA, hold Coreg 2/2 low BP    Chronic Systolic CHF  - appears compensated  - low Na diet, daily weights, I&Os  - hold Lasix and Coreg 2/2 low BPs    CAD s/p CABG  - with ischemic CM; recent NSTEMI in 8/2020 - medical mgmt  - no c/o chest pain  - on ASA, Lipitor; hold Coreg 2/2 above    GERD  - cont Protonix    Bioprosthetic MVR  - on ASA    HTN  - BP low, see above  - hold Coreg, Lasix  - monitor    Remote Hemorrhagic CVA  - no issues    Dementia  - at baseline    DVT Prophylaxis: Lovenox  Diet: DIET GENERAL;  Code Status: Full Code     Discussed plan of care with Elizabeth Miller MD.    Danny Nichols PA-C 2:57 PM 10/2/2020

## 2020-10-02 NOTE — ED PROVIDER NOTES
[address] ED  EMERGENCYDEPARTMENT ENCOUNTER      Pt Name: Claudia Jacobo  MRN: [id number]  [place] 11/14/1931  Date of evaluation: 10/1/2020  Mercedes Cox MD    [address] [place] [address]       Chief Complaint   Patient presents with    Fever         HISTORY OF PRESENT ILLNESS   (Location/Symptom, Timing/Onset,Context/Setting, Quality, Duration, Modifying Factors, Severity)  Note limiting factors. Claudia Jacobo is a 80 y.o. female with hx of CAD, CHF, who presents to the emergency department for fever. Per son patient appeared to have trouble breathing with temp of 100 ('point something), weaker that started today. Was diagnosed was UTI yesterday and started on abx. Patient however states she is feeling fine, denies sob, cp, abdominal pain, diarrhea. Does admit to one episode of emesis earlier this afternoon. HPI    Nursing Notes were reviewed. REVIEW OF SYSTEMS    (2-9 systems for level 4, 10 or more for level 5)     Review of Systems   Constitutional: Negative for activity change, appetite change, diaphoresis and fever. HENT: Negative for congestion, rhinorrhea and sore throat. Respiratory: Negative for cough, chest tightness, shortness of breath, wheezing and stridor. Cardiovascular: Negative for chest pain and palpitations. Gastrointestinal: Positive for nausea and vomiting. Negative for abdominal pain and diarrhea. Genitourinary: Negative for dysuria. Skin: Negative for rash and wound. Neurological: Negative for dizziness, weakness, light-headedness, numbness and headaches. Except as noted above the remainder of the review of systems was reviewedand negative.        PAST MEDICAL HISTORY     Past Medical History:   Diagnosis Date    Acute blood loss anemia 1/13/2012    Atrial fib/flutter, transient     Bacteremia 09/08/2018    E coli    CAD (coronary artery disease)     Cardiogenic shock ([facility] [address] 1/6/2012    CHF (congestive heart failure) (HCC)     Chronic diastolic CHF (congestive heart failure) ([facility] [address] 11/26/2014    Encephalopathy, metabolic 1/10/8476    WNL EEG and CT HEAD.  History of blood transfusion     Hypertension     MI, old 01/12    Mitral regurgitation 1/8/2012    No history of procedure 10/03/2016    no history of colonoscopy    NSTEMI (non-ST elevated myocardial infarction) ([facility] [address] 1/7/2012    Old cerebral hemorrhage without late effect 2/7/2017    Pneumonia 2/6/2013    Respiratory failure, acute ([facility] [address] 5/7/5520    Uncomplicated late onset Alzheimer's dementia ([facility] [address] 12/13/2018    Unspecified cerebral artery occlusion with cerebral infarction          SURGICAL HISTORY       Past Surgical History:   Procedure Laterality Date    CARDIAC SURGERY      CORONARY ARTERY BYPASS GRAFT  1/12/12    DILATION AND CURETTAGE OF UTERUS      ENDOSCOPY, COLON, DIAGNOSTIC      JOINT REPLACEMENT      MITRAL VALVE REPLACEMENT  1/12/12    porcine valve    MOUTH SURGERY      TRACHEOSTOMY TUBE PLACEMENT      UPPER GASTROINTESTINAL ENDOSCOPY  10/03/2016         CURRENT MEDICATIONS       Previous Medications    ASPIRIN 81 MG TABLET    Take 81 mg by mouth daily. ATORVASTATIN (LIPITOR) 20 MG TABLET    TAKE ONE TABLET BY MOUTH DAILY    CARVEDILOL (COREG) 6.25 MG TABLET    TAKE ONE TABLET BY MOUTH TWICE A DAY WITH MEALS    ESCITALOPRAM (LEXAPRO) 10 MG TABLET    Take 1 tablet by mouth daily    FEXOFENADINE (ALLEGRA) 180 MG TABLET    Take 180 mg by mouth daily as needed. FUROSEMIDE (LASIX) 40 MG TABLET    Take 1 tablet by mouth daily    LISINOPRIL (PRINIVIL;ZESTRIL) 5 MG TABLET    Take 1 tablet by mouth daily    MELATONIN (RA MELATONIN) 3 MG TABS TABLET    Take 1 tablet by mouth daily    PANTOPRAZOLE (PROTONIX) 40 MG TABLET    TAKE ONE TABLET BY MOUTH DAILY       ALLERGIES     Morphine; Warfarin; Cefuroxime;  Levofloxacin [levofloxacin]; and Azithromycin    FAMILY HISTORY       Family History   Problem Relation Age of Onset    Heart Disease Mother    Ardyth [place] appropriately. Not in acute distress     HENT:      Head: Normocephalic and atraumatic. Eyes:      Conjunctiva/sclera: Conjunctivae normal.      Pupils: Pupils are equal, round, and reactive to light. Neck:      Musculoskeletal: Normal range of motion and neck supple. Cardiovascular:      Rate and Rhythm: Normal rate and regular rhythm. Heart sounds: Normal heart sounds. No murmur. No friction rub. No gallop. Pulmonary:      Effort: Pulmonary effort is normal. Tachypnea present. No respiratory distress. Breath sounds: Rales present. No decreased breath sounds, wheezing or rhonchi. Abdominal:      General: Bowel sounds are normal. There is no distension. Palpations: Abdomen is soft. Tenderness: There is no abdominal tenderness. There is no guarding or rebound. Musculoskeletal: Normal range of motion. General: No tenderness or deformity. Right lower leg: No edema. Left lower leg: No edema. Skin:     General: Skin is warm and dry. Findings: No rash. Neurological:      Mental Status: She is alert and oriented to person, place, and time. GCS: GCS eye subscore is 4. GCS verbal subscore is 5. GCS motor subscore is 6. Psychiatric:         Behavior: Behavior is cooperative. DIAGNOSTIC RESULTS     EKG: All EKG's are interpreted by the Emergency Department Physicianwho either signs or Co-signs this chart in the absence of a cardiologist.    The Ekg interpreted by me shows  normal sinus rhythm with a rate of 92  Axis is   Normal  QTc is  450  Intervals and Durations are unremarkable.       ST Segments: no acute change  Nonspecific changes from 8/26/20    RADIOLOGY:   Non-plain film images such as CT, Ultrasound and MRI are read by the radiologist. Plain radiographic images are visualized and preliminarily interpreted by the emergency physician with the below findings:      Interpretation per the Radiologist below, if available at the time of this Narrative:     Performed at:  [facility]) [facility],  [place], [place]   Phone [phone]   BLOOD GAS, VENOUS - Abnormal; Notable for the following components:    pO2, Mo 50.5 (*)     Carboxyhemoglobin 1.9 (*)     All other components within normal limits    Narrative:     Performed at:  [facility],  [place]   Phone [phone]   PROCALCITONIN - Abnormal; Notable for the following components:    Procalcitonin 0.33 (*)     All other components within normal limits    Narrative:     Performed at:  [place] [address],  [place], [place]   Phone [phone]   RAPID INFLUENZA A/B ANTIGENS    Narrative:     Performed at:  [place] [address],  [place], [place]   Phone [phone]   CULTURE, BLOOD 1   CULTURE, URINE   TROPONIN    Narrative:     Performed at:  [place] [address],  [place], [place]   Phone [phone]   URINALYSIS    Narrative:     Performed at:  [place] [address],  [place]   Phone [phone]    Narrative:     Performed at:  [facility]) [facility],  [place], [place]   Phone [phone]       All other labs were within normal range ornot returned as of this dictation.     EMERGENCY DEPARTMENT COURSE and DIFFERENTIAL DIAGNOSIS/MDM:   Vitals:    Vitals:    10/01/20 2230 10/02/20 0037 10/02/20 0442 10/02/20 0737   BP: (!) 154/75 113/81 112/81 119/70   Pulse: 95 90  101   Resp: (!) 32 30  26   Temp: 100.3 °F (37.9 °C) 99.2 °F (37.3 °C)  100 °F (37.8 °C)   TempSrc:    Oral   SpO2: 100% 100%  100%   Weight: 140 lb (63.5 kg)      Height: 5' 3\" (1.6 m)            MDM    ED COURSE/MDM    -Naida Baston Alejandrina Gary is a 80 y.o. female with a history of CAD, CHF who presents ED for fever and shortness of breath. Per son he noted that she was breathing harder today, with a fever in the 100 and appeared weaker than normal.  Patient however denies this states that she has been feeling fine and denies shortness of breath chest pain. Upon arrival temperature was 100.3, respiratory rate of 32, with oxygen saturation at 88-89% on room air. Patient does not use oxygen at home, was placed on 2 L nasal cannula now 100%. -Was found to have a UTI yesterday started on Macrobid by PCP  -Patient was recently admitted 8/24/2020 for NSTEMI and acute CHF exacerbation and discharged 8/28/2020, with cardiology recommending medical management at the time.  -Lab work-up was significant for lactic acidosis of 2.0, elevated proBNP of 5900 (was 2700 on 8/24/2020), leukocytosis of 18.5 without bandemia. Negative influenza, covid negative. UA negative for acute infection  -Chest x-ray shows COPD with no acute infiltrate identified. Resolved bibasilar infiltrates when compared to the previous study. -CT chest shows no acute cardiopulmonary abnormality. Small left pleural effusion. Cholelithiasis. -Given lactic acidosis, elevated white count as well as fever, treated with broad-spectrum antibiotics of vancomycin and cefepime. Consult the hospitalist for admission, he requested a CT chest to continue appropriate antibiotics.  -Labs and imaging reviewed and results discussed with patient.  -Plan for admission for further workup and treatment discussed with patient and family. Patient and family in agreement with plan and have nofurther questions/concerns      REASSESSMENT      Well appearing, non toxic, alert, oriented speaking in full sentences and hemodynamically stable upon discharge      CRITICAL CARE TIME   Total Critical Care time was 25 minutes, excluding separately reportableprocedures.   There was a high probability of clinicallysignificant/life threatening deterioration in the patient's condition which required my urgent intervention. CONSULTS:  IP CONSULT TO PHARMACY    PROCEDURES:  Unless otherwise noted below, none     Procedures    FINAL IMPRESSION      1. Acute respiratory failure with hypoxia ([facility] [address]    2. Fever, unspecified fever cause          DISPOSITION/PLAN   DISPOSITION Admitted 10/02/2020 04:40:06 AM      PATIENT REFERREDTO:  No follow-up provider specified.     DISCHARGE MEDICATIONS:  New Prescriptions    No medications on file          (Please note that portions of this note were completed with a voice recognition program.  Efforts were made to edit the dictations but occasionally wordsare mis-transcribed.)    Meli Erickson MD (electronically signed)  Attending Emergency Physician            Meli Erickson MD  10/02/20 0369

## 2020-10-02 NOTE — PLAN OF CARE
79 yo f presented with fever, fatigue, N/V, generalized weakness w/ difficulty ambulating. She was reportedly dx w/ UTI on 9/30 and was started on ABX. The cx from that UA is copied below. UA today is clear. Rapid COVID and Rapid Flu were neg. CXR and CT chest clear except for a small L pleural eff. Her PCT was 0.33. Unclear source. She has reportedly been having difficulty ambulating w/ her walker for the last day. Febrile illness  Leukocytosis  Generalized weakness  Lactic acidosis  Elevated BNP      Urine Culture, Routine  09/30/2020  4:01 PM  [facility]    <50,000 CFU/ml mixed skin/urogenital zachary.  No further workup

## 2020-10-02 NOTE — CARE COORDINATION
ED consult. Pt active with [facility] w/SN only per [facility]. Pt is being admitted. CM will follow.

## 2020-10-03 LAB
ANION GAP SERPL CALCULATED.3IONS-SCNC: 13 MMOL/L (ref 3–16)
ANION GAP SERPL CALCULATED.3IONS-SCNC: 13 MMOL/L (ref 3–16)
BASOPHILS ABSOLUTE: 0.1 K/UL (ref 0–0.2)
BASOPHILS RELATIVE PERCENT: 0.2 %
BUN BLDV-MCNC: 41 MG/DL (ref 7–20)
BUN BLDV-MCNC: 43 MG/DL (ref 7–20)
CALCIUM SERPL-MCNC: 8 MG/DL (ref 8.3–10.6)
CALCIUM SERPL-MCNC: 8.6 MG/DL (ref 8.3–10.6)
CHLORIDE BLD-SCNC: 103 MMOL/L (ref 99–110)
CHLORIDE BLD-SCNC: 105 MMOL/L (ref 99–110)
CO2: 19 MMOL/L (ref 21–32)
CO2: 20 MMOL/L (ref 21–32)
CREAT SERPL-MCNC: 1.6 MG/DL (ref 0.6–1.2)
CREAT SERPL-MCNC: 1.7 MG/DL (ref 0.6–1.2)
EOSINOPHILS ABSOLUTE: 0.2 K/UL (ref 0–0.6)
EOSINOPHILS RELATIVE PERCENT: 0.8 %
GFR AFRICAN AMERICAN: 34
GFR AFRICAN AMERICAN: 37
GFR NON-AFRICAN AMERICAN: 28
GFR NON-AFRICAN AMERICAN: 30
GLUCOSE BLD-MCNC: 109 MG/DL (ref 70–99)
GLUCOSE BLD-MCNC: 130 MG/DL (ref 70–99)
HCT VFR BLD CALC: 33.2 % (ref 36–48)
HEMOGLOBIN: 9.9 G/DL (ref 12–16)
LACTIC ACID: 2.3 MMOL/L (ref 0.4–2)
LACTIC ACID: 2.9 MMOL/L (ref 0.4–2)
LACTIC ACID: 3.1 MMOL/L (ref 0.4–2)
LYMPHOCYTES ABSOLUTE: 0.5 K/UL (ref 1–5.1)
LYMPHOCYTES RELATIVE PERCENT: 2.1 %
MCH RBC QN AUTO: 27.9 PG (ref 26–34)
MCHC RBC AUTO-ENTMCNC: 29.9 G/DL (ref 31–36)
MCV RBC AUTO: 93.4 FL (ref 80–100)
MONOCYTES ABSOLUTE: 0.5 K/UL (ref 0–1.3)
MONOCYTES RELATIVE PERCENT: 2.3 %
NEUTROPHILS ABSOLUTE: 22.3 K/UL (ref 1.7–7.7)
NEUTROPHILS RELATIVE PERCENT: 94.6 %
PDW BLD-RTO: 17.4 % (ref 12.4–15.4)
PLATELET # BLD: 193 K/UL (ref 135–450)
PMV BLD AUTO: 8 FL (ref 5–10.5)
POTASSIUM REFLEX MAGNESIUM: 3.7 MMOL/L (ref 3.5–5.1)
POTASSIUM REFLEX MAGNESIUM: 4.9 MMOL/L (ref 3.5–5.1)
RBC # BLD: 3.56 M/UL (ref 4–5.2)
SODIUM BLD-SCNC: 136 MMOL/L (ref 136–145)
SODIUM BLD-SCNC: 137 MMOL/L (ref 136–145)
VANCOMYCIN TROUGH: 25.6 UG/ML (ref 10–20)
WBC # BLD: 23.6 K/UL (ref 4–11)

## 2020-10-03 PROCEDURE — 97166 OT EVAL MOD COMPLEX 45 MIN: CPT

## 2020-10-03 PROCEDURE — 6370000000 HC RX 637 (ALT 250 FOR IP): Performed by: INTERNAL MEDICINE

## 2020-10-03 PROCEDURE — 6360000002 HC RX W HCPCS: Performed by: INTERNAL MEDICINE

## 2020-10-03 PROCEDURE — 2580000003 HC RX 258: Performed by: PHYSICIAN ASSISTANT

## 2020-10-03 PROCEDURE — 36415 COLL VENOUS BLD VENIPUNCTURE: CPT

## 2020-10-03 PROCEDURE — 99233 SBSQ HOSP IP/OBS HIGH 50: CPT | Performed by: INTERNAL MEDICINE

## 2020-10-03 PROCEDURE — 83605 ASSAY OF LACTIC ACID: CPT

## 2020-10-03 PROCEDURE — 94640 AIRWAY INHALATION TREATMENT: CPT

## 2020-10-03 PROCEDURE — 2000000000 HC ICU R&B

## 2020-10-03 PROCEDURE — 51798 US URINE CAPACITY MEASURE: CPT

## 2020-10-03 PROCEDURE — 97530 THERAPEUTIC ACTIVITIES: CPT

## 2020-10-03 PROCEDURE — 99223 1ST HOSP IP/OBS HIGH 75: CPT | Performed by: INTERNAL MEDICINE

## 2020-10-03 PROCEDURE — 87449 NOS EACH ORGANISM AG IA: CPT

## 2020-10-03 PROCEDURE — 80048 BASIC METABOLIC PNL TOTAL CA: CPT

## 2020-10-03 PROCEDURE — 80202 ASSAY OF VANCOMYCIN: CPT

## 2020-10-03 PROCEDURE — 94761 N-INVAS EAR/PLS OXIMETRY MLT: CPT

## 2020-10-03 PROCEDURE — 93308 TTE F-UP OR LMTD: CPT

## 2020-10-03 PROCEDURE — 97162 PT EVAL MOD COMPLEX 30 MIN: CPT

## 2020-10-03 PROCEDURE — 2580000003 HC RX 258: Performed by: INTERNAL MEDICINE

## 2020-10-03 PROCEDURE — 85025 COMPLETE CBC W/AUTO DIFF WBC: CPT

## 2020-10-03 RX ORDER — DIMETHICONE, OXYBENZONE, AND PADIMATE O 2; 2.5; 6.6 G/100G; G/100G; G/100G
STICK TOPICAL PRN
Status: DISCONTINUED | OUTPATIENT
Start: 2020-10-03 | End: 2020-10-05 | Stop reason: HOSPADM

## 2020-10-03 RX ORDER — IPRATROPIUM BROMIDE AND ALBUTEROL SULFATE 2.5; .5 MG/3ML; MG/3ML
1 SOLUTION RESPIRATORY (INHALATION) EVERY 4 HOURS PRN
Status: DISCONTINUED | OUTPATIENT
Start: 2020-10-03 | End: 2020-10-05 | Stop reason: HOSPADM

## 2020-10-03 RX ORDER — IPRATROPIUM BROMIDE AND ALBUTEROL SULFATE 2.5; .5 MG/3ML; MG/3ML
1 SOLUTION RESPIRATORY (INHALATION)
Status: DISCONTINUED | OUTPATIENT
Start: 2020-10-03 | End: 2020-10-03

## 2020-10-03 RX ORDER — 0.9 % SODIUM CHLORIDE 0.9 %
500 INTRAVENOUS SOLUTION INTRAVENOUS
Status: DISCONTINUED | OUTPATIENT
Start: 2020-10-03 | End: 2020-10-05 | Stop reason: HOSPADM

## 2020-10-03 RX ADMIN — ESCITALOPRAM OXALATE 10 MG: 10 TABLET ORAL at 09:17

## 2020-10-03 RX ADMIN — PANTOPRAZOLE SODIUM 40 MG: 40 TABLET, DELAYED RELEASE ORAL at 09:17

## 2020-10-03 RX ADMIN — Medication 10 ML: at 19:44

## 2020-10-03 RX ADMIN — MUPIROCIN: 20 OINTMENT TOPICAL at 11:34

## 2020-10-03 RX ADMIN — VANCOMYCIN HYDROCHLORIDE 1000 MG: 1 INJECTION, POWDER, LYOPHILIZED, FOR SOLUTION INTRAVENOUS at 01:50

## 2020-10-03 RX ADMIN — MUPIROCIN: 20 OINTMENT TOPICAL at 19:44

## 2020-10-03 RX ADMIN — Medication: at 13:43

## 2020-10-03 RX ADMIN — CEFEPIME 2 G: 2 INJECTION, POWDER, FOR SOLUTION INTRAVENOUS at 23:30

## 2020-10-03 RX ADMIN — SODIUM CHLORIDE: 9 INJECTION, SOLUTION INTRAVENOUS at 03:15

## 2020-10-03 RX ADMIN — SODIUM CHLORIDE 500 ML: 9 INJECTION, SOLUTION INTRAVENOUS at 13:38

## 2020-10-03 RX ADMIN — CEFEPIME 2 G: 2 INJECTION, POWDER, FOR SOLUTION INTRAVENOUS at 11:30

## 2020-10-03 RX ADMIN — IPRATROPIUM BROMIDE AND ALBUTEROL SULFATE 1 AMPULE: .5; 3 SOLUTION RESPIRATORY (INHALATION) at 13:32

## 2020-10-03 RX ADMIN — SODIUM CHLORIDE: 9 INJECTION, SOLUTION INTRAVENOUS at 23:29

## 2020-10-03 RX ADMIN — ATORVASTATIN CALCIUM 20 MG: 10 TABLET, FILM COATED ORAL at 09:17

## 2020-10-03 RX ADMIN — IPRATROPIUM BROMIDE AND ALBUTEROL SULFATE 1 AMPULE: .5; 3 SOLUTION RESPIRATORY (INHALATION) at 23:43

## 2020-10-03 RX ADMIN — MELATONIN 3 MG ORAL TABLET 3 MG: 3 TABLET ORAL at 19:43

## 2020-10-03 RX ADMIN — Medication 10 ML: at 09:17

## 2020-10-03 RX ADMIN — ASPIRIN 81 MG: 81 TABLET, CHEWABLE ORAL at 09:16

## 2020-10-03 ASSESSMENT — PAIN SCALES - GENERAL
PAINLEVEL_OUTOF10: 0

## 2020-10-03 NOTE — PROGRESS NOTES
Inpatient Physical Therapy Evaluation and Treatment    Unit: ICU  Date:  10/3/2020  Patient Name:    Kaleigh Escalante  Admitting diagnosis:  Febrile illness [R50.9]  Febrile illness [R50.9]  Admit Date:  10/1/2020  Precautions/Restrictions/WB Status/ Lines/ Wounds/ Oxygen: Fall risk, Bed/chair alarm, Lines -IV and Truong catheter, Telemetry and Continuous pulse oximetry    Treatment Time:  12:05-12:40  Treatment Number:  1   Timed Code Treatment Minutes:25  minutes  Total Treatment Minutes: 35   minutes    Patient Goals for Therapy: \" I want to sleep \"          Discharge Recommendations: SNF  DME needs for discharge: defer to facility       Therapy recommendation for EMS Transport: requires transport by cot due to inability to transfer without lift equiptment    Therapy recommendations for staff:   Bed mobility and sitting EOB with Ax2    History Of Present Illness:       The patient is a 80 y.o. female with a PMH of CAD s/p CABG, Atrial Fibrillation, HTN, Alzheimer's Dementia, S/p MVR, Chronic Diastolic CHF, hx of CVA who presented to [facility] ED with complaint of fevers with associated SOB and wheezing, nausea and vomiting. Pt is a poor historian d/t a history of dementia - all hx was obtained from son. Pt has had N/V without abdominal pain and fevers >100 at home. She does have a hx of CHF and exposure to second hand smoke. She is a never smoker. Family denies any covid exposures, LE swelling, orthopnea, weight gain, reported chest pain or palpitations, flank pain, wounds or concerns for skin infections. She has been generally more week over the last couple of days. Of note, pt was started on Macrobid for a presumed UTI by PCP on 9/30/2020.      Work up in the ED showed: fever of 102.1, increased RR, tachypnea and low BPs. Pt on room air. WBC was 18.5. Lactic acid of 2. PCT 0.33. ProBNP: 5957, trop neg. Flu was negative. COVID test neg. UA negative.  CT chest showed a small left pleural effusion w/o acute cardiopulmonary disease. Covid not detected, rapid flu negative  Home Health S4 Level Recommendation:  NA  AM-PAC Mobility Score    AM-PAC Inpatient Mobility Raw Score : 9         Preadmission Environment  copied from 8/26 and updated  Pt. [place] son and his family (living with son and family)  Home environment:  2 story home. Steps to enter first floor:   2 steps without HR  Bathroom: walk in shower with shower chair, grab bars, hand held shower  Equipment owned: RW, cane, toilet riser with HR, hospital bed, manual transport wheelchair, lift chair, shower chair     Preadmission Status   Pt. Not Able to drive - family provides transportation  Pt. Is IND with dressing  Pt. Had assistance from family/aide for meals, bathing, cleaning, laundry  Private Duty HHA  x 5 days a week  Pt. Fully independent for transfers and gait and walked with RW, uses cane in community and assist of family member. History of falls: 2 falls, none since the last admission  Hobbies: watching TV (game shows)       Pain   No  But appeared mildly SOB and audibly wheezing-RN aware    Cognition    A&O Person, Place and Time ( month, year) , situation  Able to follow 1 step commands    Subjective  Patient lying supine in bed with family at bedside. Pt agreeable to this PT eval & tx. Upper Extremity ROM/Strength  Please see OT evaluation. Lower Extremity ROM / Strength   AROM WFL: Yes  ROM limitations:   Strength limited-required max A to mobilize to EOB    Lower Extremity Sensation    NT    Lower Extremity Proprioception:   NT    Coordination and Tone  WFL    Balance  Sitting:  Fair -; Mod A   Comments: heavy posterior lean, attempting to return to supine    Standing: Not tested; N/A  Comments:     Bed Mobility   Supine to Sit:    Max A  and 2 persons  Sit to Supine:   Max A  and 2 persons  Rolling:   Not Tested  Scooting in sitting:  Max A  and 2 persons  Scooting in supine:  Max A  and 2 persons    Transfer Training     Sit to stand:   Not

## 2020-10-03 NOTE — PROGRESS NOTES
RESPIRATORY THERAPY ASSESSMENT    Name:  Adri Estrada Record Number:  [id number]  Age: 80 y.o. Gender: female  : 1931  Today's Date:  10/3/2020  Room:  3003/3003-01    Assessment     Is the patient being admitted for a COPD or Asthma exacerbation? No   (If yes the patient will be seen every 4 hours for the first 24 hours and then reassessed)    Patient Admission Diagnosis      Allergies  Allergies   Allergen Reactions    Morphine Shortness Of Breath    Warfarin Other (See Comments)    Cefuroxime      Has tolerated Ceftriaxone and Cephalexin since Cefuroxime added to allergies.  Levofloxacin [Levofloxacin]      rash    Azithromycin Nausea And Vomiting     States had N/V one hour past oral doses of this med. Minimum Predicted Vital Capacity:               Actual Vital Capacity:                    Pulmonary History:CHF/Pulmonary Edema  Home Oxygen Therapy:   none  Home Respiratory Therapy: none   Current Respiratory Therapy:   Duoneb Q4WA          Respiratory Severity Index(RSI)   Patients with orders for inhalation medications, oxygen, or any therapeutic treatment modality will be placed on Respiratory Protocol. They will be assessed with the first treatment and at least every 72 hours thereafter. The following severity scale will be used to determine frequency of treatment intervention.     Smoking History: No Smoking History = 0    Social History  Social History     Tobacco Use    Smoking status: Passive Smoke Exposure - Never Smoker    Smokeless tobacco: Never Used   Substance Use Topics    Alcohol use: No    Drug use: No       Recent Surgical History: None = 0  Past Surgical History  Past Surgical History:   Procedure Laterality Date    CARDIAC SURGERY      CORONARY ARTERY BYPASS GRAFT  12    DILATION AND CURETTAGE OF UTERUS      ENDOSCOPY, COLON, DIAGNOSTIC      JOINT REPLACEMENT      MITRAL VALVE REPLACEMENT  12    porcine valve    MOUTH SURGERY      TRACHEOSTOMY TUBE PLACEMENT      UPPER GASTROINTESTINAL ENDOSCOPY  10/03/2016       Level of Consciousness: Alert, Follows Commands but Disoriented = 1    Level of Activity: Walking with assistance = 1    Respiratory Pattern: Regular Pattern; RR 8-20 = 0    Breath Sounds: Absent bilaterally and/or with wheezes = 3    Sputum   ,  ,    Cough: Strong, spontaneous, non-productive = 0    Vital Signs   /68   Pulse 108   Temp 98.6 °F (37 °C) (Axillary)   Resp 26   Ht 5' 3\" (1.6 m)   Wt 125 lb 14.1 oz (57.1 kg)   SpO2 98%   BMI 22.30 kg/m²   SPO2 (COPD values may differ): Greater than or equal to 92% on room air = 0    Peak Flow (asthma only): not applicable = 0    RSI: 5-6 = Q4hr PRN (every four hours as needed) for dyspnea        Plan       Goals: medication delivery, mobilize retained secretions, volume expansion and improve oxygenation    Patient/caregiver was educated on the proper method of use for Respiratory Care Devices:  Yes      Level of patient/caregiver understanding able to:   ? Verbalize understanding   ? Demonstrate understanding       ? Teach back        ? Needs reinforcement       ? No available caregiver               ? Other:     Response to education:  Micah Found     Is patient being placed on Home Treatment Regimen? NA     Does the patient have everything they need prior to discharge? NA     Comments: chart reviewed and pt assesed    Plan of Care: pt to go on Q4PRN regiment    Electronically signed by Jesse Melendez RCP on 10/3/2020 at 10:32 AM    Respiratory Protocol Guidelines     1. Assessment and treatment by Respiratory Therapy will be initiated for medication and therapeutic interventions upon initiation of aerosolized medication. 2. Physician will be contacted for respiratory rate (RR) greater than 35 breaths per minute. Therapy will be held for heart rate (HR) greater than 140 beats per minute, pending direction from physician.   3. Bronchodilators will be administered via Metered Dose Inhaler (MDI) with spacer when the following criteria are met:  a. Alert and cooperative     b. HR < 140 bpm  c. RR < 30 bpm                d. Can demonstrate a 2-3 second inspiratory hold  4. Bronchodilators will be administered via Hand Held Nebulizer [facility]) to patients when ANY of the following criteria are met  a. Incognizant or uncooperative          b. Patients treated with HHN at Home        c. Unable to demonstrate proper use of MDI with spacer     d. RR > 30 bpm   5. Bronchodilators will be delivered via Metered Dose Inhaler (MDI), HHN, Aerogen to intubated patients on mechanical ventilation. 6. Inhalation medication orders will be delivered and/or substituted as outlined below. Aerosolized Medications Ordering and Administration Guidelines:    1. All Medications will be ordered by a physician, and their frequency and/or modality will be adjusted as defined by the patients Respiratory Severity Index (RSI) score. 2. If the patient does not have documented COPD, consider discontinuing anticholinergics when RSI is less than 9.  3. If the bronchospasm worsens (increased RSI), then the bronchodilator frequency can be increased to a maximum of every 4 hours. If greater than every 4 hours is required, the physician will be contacted. 4. If the bronchospasm improves, the frequency of the bronchodilator can be decreased, based on the patient's RSI, but not less than home treatment regimen frequency. 5. Bronchodilator(s) will be discontinued if patient has a RSI less than 9 and has received no scheduled or as needed treatment for 72  Hrs. Patients Ordered on a Mucolytic Agent:    1. Must always be administered with a bronchodilator. 2. Discontinue if patient experiences worsened bronchospasm, or secretions have lessened to the point that the patient is able to clear them with a cough. Anti-inflammatory and Combination Medications:    1.  If the patient lacks prior history of lung disease, is not

## 2020-10-03 NOTE — PROGRESS NOTES
4 Eyes Skin Assessment     The patient is being assess for   Admission    I agree that 2 RN's have performed a thorough Head to Toe Skin Assessment on the patient. ALL assessment sites listed below have been assessed. Areas assessed by both nurses:   [x]   Head, Face, and Ears   [x]   Shoulders, Back, and Chest, Abdomen  [x]   Arms, Elbows, and Hands   [x]   Coccyx, Sacrum, and Ischium  [x]   Legs, Feet, and Heels        Blanchable redness on sacrum, heels, perineum, and underneath breasts  Possible DTI on sacrum and left buttock    Co-signer eSignature: Electronically signed by Chidi Purcell RN on 10/3/20 at 6:38 AM EDT    Does the Patient have Skin Breakdown?   No          Casey Prevention initiated:  Yes   Wound Care Orders initiated:  No      [facility] nurse consulted for Pressure Injury (Stage 3,4, Unstageable, DTI, NWPT, Complex wounds)and New or Established Ostomies:  No      Primary Nurse eSignature: Electronically signed by Sadia Herrera RN on 10/2/20 at 10:41 PM EDT

## 2020-10-03 NOTE — PROGRESS NOTES
Spoke with TORI Joe to finish admission questions, pt was able to answer some questions. Updated on pt condition. All questions answered.

## 2020-10-03 NOTE — PROGRESS NOTES
Patient admitted to ICU for sepsis/respiratory failure. Telemetry monitor hooked up to patient. NSR on monitor. Breathing pattern appears unlabored. Lung sounds diminished. Admitted to ICU on RA. A&O x3, disoriented to situation, has hx of dementia. BUE strength is strong. BLE strength is strong. PERRLA. Abdomen appears rounded and soft. Bowel sounds active. IV access is 20g left wrist PIV. External urinary catheter placed. No presence of edema. CHG bath provided for patient. NS started at 100 mL/hr. Nightly melatonin given. Skin assessment completed. No other needs at this time. Pt is unable to demonstrate the ability to move to and from a reclining position.

## 2020-10-03 NOTE — PROGRESS NOTES
Bladder scan showed 525 ml. Truong inserted using sterile technique. 500 ml of clear lyssa urine drained.

## 2020-10-03 NOTE — CONSULTS
Patient is being seen at the request of Rita Bose MD   for a consultation for ICU    HISTORY OF PRESENT ILLNESS:   80years old with history of coronary artery disease post CABG, A. fib, Alzheimer's dementia, post MVR, CVA presented with shortness of breath. Associated with wheezes and fever. Generalized weakness for 2 days. Nausea and vomiting with no abdominal pain. No COVID exposure. Never smoked. Recently started on Macrobid for UTI by PCP 9/30/2020. In ED with a fever 102.1 tachypneic and tachycardic as well as hypotensive. Patient is with underlying dementia poor historian not able to obtain further HPI. PAST MEDICAL HISTORY:  Past Medical History:   Diagnosis Date    Acute blood loss anemia 1/13/2012    Atrial fib/flutter, transient     Bacteremia 09/08/2018    E coli    CAD (coronary artery disease)     Cardiogenic shock ([address] 1/6/2012    [facility]     Chronic diastolic CHF (congestive heart failure) ([address] 11/26/2014    Encephalopathy, metabolic 8/31/9200    WNL EEG and CT HEAD.      History of blood transfusion     Hypertension     MI, old 01/12    Mitral regurgitation 1/8/2012    No history of procedure 10/03/2016    no history of colonoscopy    NSTEMI (non-ST elevated myocardial infarction) ([address] 1/7/2012    Old cerebral hemorrhage without late effect 2/7/2017    Pneumonia 2/6/2013    Respiratory failure, acute ([address] 2/4/2849    Uncomplicated late onset Alzheimer's dementia ([address] 12/13/2018    Unspecified cerebral artery occlusion with cerebral infarction      PAST SURGICAL HISTORY:  Past Surgical History:   Procedure Laterality Date    CARDIAC SURGERY      CORONARY ARTERY BYPASS GRAFT  1/12/12    DILATION AND CURETTAGE OF UTERUS      ENDOSCOPY, COLON, DIAGNOSTIC      JOINT REPLACEMENT      MITRAL VALVE REPLACEMENT  1/12/12    porcine valve    MOUTH SURGERY      TRACHEOSTOMY TUBE PLACEMENT      UPPER GASTROINTESTINAL Psych: Oriented x 1-2. Confused. No anxiety. LABS:  CBC:   Recent Labs     10/01/20  2231 10/03/20  0510   WBC 18.5* 23.6*   HGB 11.1* 9.9*   HCT 35.2* 33.2*   MCV 87.3 93.4    193     BMP:   Recent Labs     10/01/20  2231 10/03/20  0510    136   K 4.0 4.9   CL 99 103   CO2 27 20*   BUN 19 41*   CREATININE 1.0 1.7*     LIVER PROFILE:   Recent Labs     10/01/20  2231   AST 21   ALT 9*   BILITOT 0.5   ALKPHOS 92     PT/INR: No results for input(s): PROTIME, INR in the last 72 hours. APTT: No results for input(s): APTT in the last 72 hours. UA:  Recent Labs     09/30/20  1601 10/02/20  0343   NITRITE neg  --    COLORU  --  Yellow   PHUR  --  5.5   CLARITYU  --  Clear   SPECGRAV  --  1.010   LEUKOCYTESUR large Negative   UROBILINOGEN  --  0.2   BILIRUBINUR neg Negative   BLOODU trace Negative   GLUCOSEU neg Negative     No results for input(s): PHART, ZUF9DKM, PO2ART in the last 72 hours. 10/2 Covid 19 Negative       CT chest 10/2 imaging was reviewed by me and showed   No acute cardiopulmonary abnormality  LLL atelectasis with Small left effusion    ASSESSMENT:  · Severe sepsis- unclear source ?  UTI   · Acute kidney injury  · Lactic acidosis  · Paroxysmal A. 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  · Remote hemorrhagic CVA  · Alzheimer dementia at baseline    PLAN:  Supplemental oxygen to maintain SaO2 >92%; wean as tolerated  Closely monitory airways, clinical status, cardiac rhythm, vital signs, urine output and NG output   Established venous access  IV fluid resuscitation with crystalloid targeting (30 mL/kg actual body weight) targeting CVP 8-12 and SBP>90  Maintenance IVF NS at 125 cc/hr  Broad spectrum antibiotics to include Vancomycin, Cefepime   IV Levophed to keep MAP > 65 mmHg or SBP>90  BC, UC and sputum GS&C  Lactic acid every 6 hours to monitor clearance  BMP later today   Hold coreg and lasix   Inhaled bronchodilators  Blood sugar control, with goal 140-180  DVT prophylaxis: Lovenox   MRSA prophylaxis: Bactroban  Code status: Full code

## 2020-10-03 NOTE — PROGRESS NOTES
Dr. Nixon Schooling in with patient at this time. Updated on current BP and respiratory status. Verbal order to administer existing NS bolus of 500 cc at this time, as patient's systolic bp is trending down.

## 2020-10-03 NOTE — PROGRESS NOTES
Inpatient Occupational Therapy  Evaluation and Treatment    Unit: ICU  Date:  10/3/2020  Patient Name:    Claudetta Cannon  Admitting diagnosis:  Febrile illness [R50.9]  Febrile illness [R50.9]  Admit Date:  10/1/2020  Precautions/Restrictions/WB Status/ Lines/ Wounds/ Oxygen: Fall risk, Bed/chair alarm, Lines -IV and Truong catheter, Telemetry and Continuous pulse oximetry    Treatment Time:  4168-9192  Treatment Number: 1     Billable Treatment Time: 25 minutes   Total Treatment Time:   35   minutes    Patient Goals for Therapy:  \" I want to sleep \"      Discharge Recommendations: SNF  DME needs for discharge: defer to facility       Therapy recommendations for staff:   Bed mobility and sitting EOB with Ax2    History of Present Illness: The patient is a 80 y. o. female with a PMH of CAD s/p CABG, Atrial Fibrillation, HTN, Alzheimer's Dementia, S/p MVR, Chronic Diastolic CHF, hx of CVA who presented to [facility] with complaint of fevers with associated SOB and wheezing, nausea and vomiting. Pt is a poor historian d/t a history of dementia - all hx was obtained from son. Pt has had N/V without abdominal pain and fevers >100 at home. She does have a hx of CHF and exposure to second hand smoke. She is a never smoker. Family denies any covid exposures, LE swelling, orthopnea, weight gain, reported chest pain or palpitations, flank pain, wounds or concerns for skin infections. She has been generally more week over the last couple of days. Of note, pt was started on Macrobid for a presumed UTI by PCP on 9/30/2020.      Work up in the ED showed: fever of 102.1, increased RR, tachypnea and low BPs. Pt on room air. WBC was 18.5. Lactic acid of 2. PCT 0.33. ProBNP: 5957, trop neg. Flu was negative. COVID test neg. UA negative. CT chest showed a small left pleural effusion w/o acute cardiopulmonary disease.   Covid not detected, rapid flu negative    Home Health S4 Level Recommendation:  NA  AM-PAC Score: AM-PAC Inpatient Daily Activity Raw Score: 14    Preadmission Environment  copied from 8/26 and updated  Pt. Gregorio Tyson and his family (living with son and family)  Home environment:  2 story home. Steps to enter first floor:   2 steps without HR  Bathroom: walk in shower with shower chair, grab bars, hand held shower  Equipment owned: RW, cane, toilet riser with HR, hospital bed, manual transport wheelchair, lift chair, shower chair     Preadmission Status   Pt. Not Able to drive - family provides transportation  Pt. Is IND with dressing  Pt. Had assistance from family/aide for meals, bathing, cleaning, laundry  Private Duty HHA  x 5 days a week  Pt. Fully independent for transfers and gait and walked with RW, uses cane in community and assist of family member. History of falls: 2 falls, none since the last admission  Hobbies: watching TV (game shows)    Pain  None reported throughout     Cognition    A&O Person, Place and Time ( month, year) , situation  Able to follow 1 step commands    Subjective  Patient lying supine in bed with family (dtr) present. Pt agreeable to this OT eval & tx. Upper Extremity ROM:    WFL,  pt able to perform all bed mobility, transfers, and gait without ROM limitation. Upper Extremity Strength:    BUE strength WFL, but not formally assessed w/ MMT    Upper Extremity Sensation    WNL    Upper Extremity Proprioception:  WNL    Coordination and Tone  WNL    Balance  Functional Sitting Balance:  Impaired  Functional Standing Balance:NT    Bed mobility:    Supine to sit: Max A of 2  Sit to supine:   Max A of 2  Rolling:    Not Tested  Scooting in sitting:   Max A of 2  Scooting to head of bed:   Max A of 2    Bridging:   Not Tested    Transfers:    Sit to stand:  Not Tested  Stand to sit:  Not Tested  Bed to chair:   Not Tested  Standard toilet: Not Tested  Bed to [facility]:  Not Tested    Dressing:     UE:   Not Tested  LE:    Max A    Bathing:    UE:  Not Tested  LE:  Not Tested    Eating:   Min A    Toileting:  Not Tested    Activity Tolerance   Pt completed therapy session with SOB noted with rest,activity with audible wheezing    BP HR SpO2   Supine, at rest 92/73 97bpm 98%    Seated at /72 110bpm 94%   End of session 113/64 103bpm 94%     Positioning Needs:   Pt in bed, alarm set, positioned in proper neutral alignment and pressure relief provided. Call light provided and all needs within reach    Exercise / Activities Initiated:   N/A    Patient/Family Education:   Role of OT  Recommendations for DC    Assessment of Deficits: Pt seen for Occupational therapy evaluation in acute care setting. Pt demonstrated decreased Activity tolerance, ADLs, IADLs, Balance , Bed mobility, Safety Awareness, Transfers and Cognition. Pt functioning below baseline and will likely benefit from skilled occupational therapy services to maximize safety and independence. Goal(s) : To be met in 3 Visits:  1). Bed to [facility]: Mod A    To be met in 5 Visits:  1). Supine to/from Sit:  Min A  2). Upper Body Bathing:   Min A  3). Lower Body Bathing: Mod A  4). Upper Body Dressing:  Min A  5). Lower Body Dressing: Mod A  6). Pt to demonstrate UE exs x 15 reps with minimal cues    Rehabilitation Potential:  Fair for goals listed above. Strengths for achieving goals include: PLOF, Family Support and Pt cooperative  Barriers to achieving goals include:  Complexity of condition and Cognition     Plan: To be seen 3-5 x/wk while in acute care setting for therapeutic exercises, bed mobility, transfers, dressing, bathing, family/patient education, ADL/IADL retraining, energy conservation training.      IRISH Graves, OTR/L   [id number]           If patient discharges from this facility prior to next visit, this note will serve as the Discharge Summary

## 2020-10-03 NOTE — PROGRESS NOTES
495 ml found upon bladder scan. No output has been documented since the pt came into the ER on 10/1. Will bladder scan again in a couple of hours and insert ayala if pt still has not urinated.

## 2020-10-03 NOTE — PROGRESS NOTES
Admit: 10/1/2020    Name:  Savannah Correa  Room:  [facility]003-  MRN:    [id number]    Critical Care Daily Progress Note for 10/3/2020     Interval History:   Bp is on the low side    Scheduled Meds:   mupirocin   Nasal BID    atorvastatin  20 mg Oral Daily    aspirin  81 mg Oral Daily    escitalopram  10 mg Oral Daily    melatonin  3 mg Oral Nightly    pantoprazole  40 mg Oral Daily    cefepime  2 g Intravenous Q12H    sodium chloride flush  10 mL Intravenous 2 times per day    influenza virus vaccine  0.5 mL Intramuscular Once       Continuous Infusions:   sodium chloride 125 mL/hr at 10/03/20 1133       PRN Meds:  perflutren lipid microspheres, sodium chloride, ipratropium-albuterol, medicated lip balm, sodium chloride flush, acetaminophen **OR** acetaminophen                  Objective:     Temp  Av.8 °F (37.1 °C)  Min: 98.2 °F (36.8 °C)  Max: 99.3 °F (37.4 °C)  Pulse  Av.3  Min: 85  Max: 108  BP  Min: 77/52  Max: 130/60  SpO2  Av.1 %  Min: 93 %  Max: 100 %  Patient Vitals for the past 4 hrs:   BP Temp Temp src Pulse Resp SpO2   10/03/20 1322 (!) 91/43 -- -- 99 26 99 %   10/03/20 1115 (!) 97/52 98.2 °F (36.8 °C) Oral 85 24 98 %         Intake/Output Summary (Last 24 hours) at 10/3/2020 1329  Last data filed at 10/3/2020 1150  Gross per 24 hour   Intake 1113 ml   Output 660 ml   Net 453 ml       Physical Exam:  Gen: No distress. Alert. Elderly  female with dementia  Eyes: No sclera icterus. No conjunctival injection. Neck: Trachea midline. Resp: No accessory muscle use. No crackles. minimal wheezes. No rhonchi. Diminished throughout,On RA  CV: Regular rate. Regular rhythm. No murmur. No rub. No edema. GI: Soft, Non-tender. Non-distended. Normal bowel sounds. Skin: Warm and dry. No rash on exposed extremities. Neuro: Awake. Grossly nonfocal    Psych: Oriented x 3. No anxiety or agitation.      Lab Data:  CBC:   Recent Labs     10/01/20  2231 10/03/20  0510   WBC 18.5* 23.6*   RBC 4.03 3.56*   HGB 11.1* 9.9*   HCT 35.2* 33.2*   MCV 87.3 93.4   RDW 16.3* 17.4*    193     BMP:   Recent Labs     10/01/20  2231 10/03/20  0510    136   K 4.0 4.9   CL 99 103   CO2 27 20*   BUN 19 41*   CREATININE 1.0 1.7*     BNP: No results for input(s): BNP in the last 72 hours. PT/INR: No results for input(s): PROTIME, INR in the last 72 hours. APTT:No results for input(s): APTT in the last 72 hours. CARDIAC ENZYMES:   Recent Labs     10/01/20  2231   TROPONINI <0.01     FASTING LIPID PANEL:  Lab Results   Component Value Date    CHOL 148 01/22/2020    HDL 52 01/22/2020    HDL 30 01/11/2012    TRIG 114 01/22/2020     LIVER PROFILE:   Recent Labs     10/01/20  2231   AST 21   ALT 9*   BILITOT 0.5   ALKPHOS 92       ECHO   Limited exam for endocarditis and MVR. A bio-prosthetic mitral valve is well seated. Mild tricuspid regurgitation. Systolic pulmonary artery pressure (SPAP) is estimated at 46 mmHg consistent   with mild pulmonary hypertension (Right atrial pressure of 3 mmHg). No change from exam done 8/27/2020. No obvious valvular endocarditis. Consider JUDD if clinically indicated; these images are not diagnostic to r/o   vegetations.        Assessment & Plan:     Patient Active Problem List    Diagnosis Date Noted    Severe sepsis ([address]     Acute kidney injury ([address]     Lactic acidosis     Febrile illness 10/02/2020    Pulmonary edema with congestive heart failure ([address]     Sepsis ([address] 08/25/2020    Flash pulmonary edema ([address] 08/25/2020    Suspected COVID-19 virus infection 93/31/2540    Uncomplicated late onset Alzheimer's dementia ([address] 12/13/2018    Pneumonia due to suspected gram-negative bacteria 09/08/2018    Old cerebral hemorrhage without late effect 02/07/2017    Anemia 09/03/2016    Hypertension     Chronic diastolic CHF (congestive heart failure) ([address] 11/26/2014    Atrial fibrillation ([address] 01/15/2012    S/P CABG (coronary artery bypass

## 2020-10-04 ENCOUNTER — APPOINTMENT (OUTPATIENT)
Dept: GENERAL RADIOLOGY | Age: 85
DRG: 871 | End: 2020-10-04
Payer: MEDICARE

## 2020-10-04 LAB
ANION GAP SERPL CALCULATED.3IONS-SCNC: 12 MMOL/L (ref 3–16)
BASOPHILS ABSOLUTE: 0 K/UL (ref 0–0.2)
BASOPHILS RELATIVE PERCENT: 0.2 %
BUN BLDV-MCNC: 42 MG/DL (ref 7–20)
CALCIUM SERPL-MCNC: 7.9 MG/DL (ref 8.3–10.6)
CHLORIDE BLD-SCNC: 109 MMOL/L (ref 99–110)
CO2: 17 MMOL/L (ref 21–32)
CREAT SERPL-MCNC: 1.6 MG/DL (ref 0.6–1.2)
EOSINOPHILS ABSOLUTE: 0.6 K/UL (ref 0–0.6)
EOSINOPHILS RELATIVE PERCENT: 3.6 %
GFR AFRICAN AMERICAN: 37
GFR NON-AFRICAN AMERICAN: 30
GLUCOSE BLD-MCNC: 103 MG/DL (ref 70–99)
GLUCOSE BLD-MCNC: 110 MG/DL (ref 70–99)
HCT VFR BLD CALC: 30.2 % (ref 36–48)
HEMOGLOBIN: 9.1 G/DL (ref 12–16)
L. PNEUMOPHILA SEROGP 1 UR AG: NORMAL
LACTIC ACID: 1.8 MMOL/L (ref 0.4–2)
LACTIC ACID: 2 MMOL/L (ref 0.4–2)
LACTIC ACID: 2.5 MMOL/L (ref 0.4–2)
LYMPHOCYTES ABSOLUTE: 0.4 K/UL (ref 1–5.1)
LYMPHOCYTES RELATIVE PERCENT: 2.3 %
MAGNESIUM: 1.9 MG/DL (ref 1.8–2.4)
MCH RBC QN AUTO: 27.9 PG (ref 26–34)
MCHC RBC AUTO-ENTMCNC: 30 G/DL (ref 31–36)
MCV RBC AUTO: 93.2 FL (ref 80–100)
MONOCYTES ABSOLUTE: 0.5 K/UL (ref 0–1.3)
MONOCYTES RELATIVE PERCENT: 2.8 %
NEUTROPHILS ABSOLUTE: 15.1 K/UL (ref 1.7–7.7)
NEUTROPHILS RELATIVE PERCENT: 91.1 %
ORGANISM: ABNORMAL
PDW BLD-RTO: 18 % (ref 12.4–15.4)
PERFORMED ON: ABNORMAL
PHOSPHORUS: 3.1 MG/DL (ref 2.5–4.9)
PLATELET # BLD: 182 K/UL (ref 135–450)
PMV BLD AUTO: 7.8 FL (ref 5–10.5)
POTASSIUM SERPL-SCNC: 3.7 MMOL/L (ref 3.5–5.1)
RBC # BLD: 3.24 M/UL (ref 4–5.2)
SODIUM BLD-SCNC: 138 MMOL/L (ref 136–145)
STREP PNEUMONIAE ANTIGEN, URINE: NORMAL
URINE CULTURE, ROUTINE: ABNORMAL
VANCOMYCIN RANDOM: 21.9 UG/ML
WBC # BLD: 16.5 K/UL (ref 4–11)

## 2020-10-04 PROCEDURE — 6360000002 HC RX W HCPCS: Performed by: INTERNAL MEDICINE

## 2020-10-04 PROCEDURE — 2580000003 HC RX 258: Performed by: INTERNAL MEDICINE

## 2020-10-04 PROCEDURE — 1200000000 HC SEMI PRIVATE

## 2020-10-04 PROCEDURE — 6370000000 HC RX 637 (ALT 250 FOR IP): Performed by: INTERNAL MEDICINE

## 2020-10-04 PROCEDURE — 83605 ASSAY OF LACTIC ACID: CPT

## 2020-10-04 PROCEDURE — U0002 COVID-19 LAB TEST NON-CDC: HCPCS

## 2020-10-04 PROCEDURE — 71045 X-RAY EXAM CHEST 1 VIEW: CPT

## 2020-10-04 PROCEDURE — 83735 ASSAY OF MAGNESIUM: CPT

## 2020-10-04 PROCEDURE — 36415 COLL VENOUS BLD VENIPUNCTURE: CPT

## 2020-10-04 PROCEDURE — 99233 SBSQ HOSP IP/OBS HIGH 50: CPT | Performed by: INTERNAL MEDICINE

## 2020-10-04 PROCEDURE — U0003 INFECTIOUS AGENT DETECTION BY NUCLEIC ACID (DNA OR RNA); SEVERE ACUTE RESPIRATORY SYNDROME CORONAVIRUS 2 (SARS-COV-2) (CORONAVIRUS DISEASE [COVID-19]), AMPLIFIED PROBE TECHNIQUE, MAKING USE OF HIGH THROUGHPUT TECHNOLOGIES AS DESCRIBED BY CMS-2020-01-R: HCPCS

## 2020-10-04 PROCEDURE — 99232 SBSQ HOSP IP/OBS MODERATE 35: CPT | Performed by: INTERNAL MEDICINE

## 2020-10-04 PROCEDURE — 80202 ASSAY OF VANCOMYCIN: CPT

## 2020-10-04 PROCEDURE — 94761 N-INVAS EAR/PLS OXIMETRY MLT: CPT

## 2020-10-04 PROCEDURE — 85025 COMPLETE CBC W/AUTO DIFF WBC: CPT

## 2020-10-04 PROCEDURE — 94640 AIRWAY INHALATION TREATMENT: CPT

## 2020-10-04 PROCEDURE — 2700000000 HC OXYGEN THERAPY PER DAY

## 2020-10-04 PROCEDURE — 80048 BASIC METABOLIC PNL TOTAL CA: CPT

## 2020-10-04 PROCEDURE — 84100 ASSAY OF PHOSPHORUS: CPT

## 2020-10-04 RX ORDER — IPRATROPIUM BROMIDE AND ALBUTEROL SULFATE 2.5; .5 MG/3ML; MG/3ML
1 SOLUTION RESPIRATORY (INHALATION) EVERY 4 HOURS
Status: DISCONTINUED | OUTPATIENT
Start: 2020-10-04 | End: 2020-10-05 | Stop reason: HOSPADM

## 2020-10-04 RX ORDER — IPRATROPIUM BROMIDE AND ALBUTEROL SULFATE 2.5; .5 MG/3ML; MG/3ML
1 SOLUTION RESPIRATORY (INHALATION) 3 TIMES DAILY
Status: DISCONTINUED | OUTPATIENT
Start: 2020-10-04 | End: 2020-10-04

## 2020-10-04 RX ADMIN — IPRATROPIUM BROMIDE AND ALBUTEROL SULFATE 1 AMPULE: .5; 3 SOLUTION RESPIRATORY (INHALATION) at 11:50

## 2020-10-04 RX ADMIN — IPRATROPIUM BROMIDE AND ALBUTEROL SULFATE 1 AMPULE: .5; 3 SOLUTION RESPIRATORY (INHALATION) at 23:47

## 2020-10-04 RX ADMIN — IPRATROPIUM BROMIDE AND ALBUTEROL SULFATE 1 AMPULE: .5; 3 SOLUTION RESPIRATORY (INHALATION) at 16:02

## 2020-10-04 RX ADMIN — IPRATROPIUM BROMIDE AND ALBUTEROL SULFATE 1 AMPULE: .5; 3 SOLUTION RESPIRATORY (INHALATION) at 19:07

## 2020-10-04 RX ADMIN — ATORVASTATIN CALCIUM 20 MG: 10 TABLET, FILM COATED ORAL at 08:55

## 2020-10-04 RX ADMIN — MUPIROCIN: 20 OINTMENT TOPICAL at 09:04

## 2020-10-04 RX ADMIN — ASPIRIN 81 MG: 81 TABLET, CHEWABLE ORAL at 08:55

## 2020-10-04 RX ADMIN — IPRATROPIUM BROMIDE AND ALBUTEROL SULFATE 1 AMPULE: .5; 3 SOLUTION RESPIRATORY (INHALATION) at 07:46

## 2020-10-04 RX ADMIN — IPRATROPIUM BROMIDE AND ALBUTEROL SULFATE 1 AMPULE: .5; 3 SOLUTION RESPIRATORY (INHALATION) at 04:26

## 2020-10-04 RX ADMIN — CEFEPIME 2 G: 2 INJECTION, POWDER, FOR SOLUTION INTRAVENOUS at 11:23

## 2020-10-04 RX ADMIN — ESCITALOPRAM OXALATE 10 MG: 10 TABLET ORAL at 08:55

## 2020-10-04 RX ADMIN — PANTOPRAZOLE SODIUM 40 MG: 40 TABLET, DELAYED RELEASE ORAL at 08:55

## 2020-10-04 ASSESSMENT — PAIN SCALES - PAIN ASSESSMENT IN ADVANCED DEMENTIA (PAINAD)
FACIALEXPRESSION: 0
BREATHING: 0
CONSOLABILITY: 0
BODYLANGUAGE: 0
NEGVOCALIZATION: 0
TOTALSCORE: 0

## 2020-10-04 ASSESSMENT — PAIN SCALES - GENERAL
PAINLEVEL_OUTOF10: 0

## 2020-10-04 NOTE — PROGRESS NOTES
Patient resting in bed, AM assessment completed. Patient confused at baseline. Lungs with exp wheezes throughout, tachypneic this AM. BS+. Truong cath noted with yellow urine. Patient denies any needs. No acute distress noted. Call light in reach. Bed check on. Will continue to monitor.

## 2020-10-04 NOTE — PROGRESS NOTES
Spoke with [address], [address] [facility]. She states she will meet with the family this evening and try to set up for the patient to leave tomorrow. Crystal did state patient needs a COVID test prior to leaving in case she goes to an inpatient facility. Patient's son Freeman Neri spoke with [facility] and says he and his wife are agreeable to meet the hospice nurse later at their home. [facility] updated on plan of care. Will continue to monitor.

## 2020-10-04 NOTE — CARE COORDINATION
Pt's son, Marcela Panda, requests referral to [facility]. Referral called to [facility], nurse with [facility] and she will be here today to meet with the pt and family at bedside. 10:22 AM C-19 ordered. Plan is for Crystal to meet with the family at home this evening and d/c pt tomorrow on 10/05/20 with [facility]. Following peripherally. Awaiting C-19 results. H&P, FS, and progress notes faxed to [facility].

## 2020-10-04 NOTE — PROGRESS NOTES
Report given to Sonia from 2W at this time. Hospice nurse Marry here to see patient and speak with patient's son Beltran Sifuentes.

## 2020-10-04 NOTE — PROGRESS NOTES
Family is meeting with hospice tonight for this patient. Patient has Q12 BMPs ordered and Q6 lactic acids. Do you still want these drawn, or do you want them to be daily? Thank you. Above message sent to Dr. Imtiaz Padilla.

## 2020-10-04 NOTE — PROGRESS NOTES
4 Eyes Skin Assessment     The patient is being assess for   Transfer to New Unit    I agree that 2 RN's have performed a thorough Head to Toe Skin Assessment on the patient. ALL assessment sites listed below have been assessed. Areas assessed by both nurses:   [x]   Head, Face, and Ears   [x]   Shoulders, Back, and Chest, Abdomen  [x]   Arms, Elbows, and Hands   [x]   Coccyx, Sacrum, and Ischium  [x]   Legs, Feet, and Heels        Blanchable redness on bilateral heels-Preventative Mepilex's in place  Possible DTI across buttocks cheeks  Redness to polina area and under bilateral breasts*    **SHARE this note so that the co-signing nurse is able to place an eSignature**    Co-signer eSignature: Electronically signed by Edward Schaeffer RN on 10/4/20 at 6:15 PM EDT    Does the Patient have Skin Breakdown?   Yes [facility] WOUND CARE was Initiated documentation include the Polina-wound, Wound Assessment, Measurements, Dressing Treatment, Drainage, and Color\",          Casey Prevention initiated:  Yes   Wound Care Orders initiated:  Yes      [id number]  nurse consulted for Pressure Injury (Stage 3,4, Unstageable, DTI, NWPT, Complex wounds)and New or Established Ostomies:  No      Primary Nurse eSignature: Electronically signed by Negro Gayle RN on 10/4/20 at 1:15 PM EDT

## 2020-10-04 NOTE — PROGRESS NOTES
Physical /Occupational Therapy  Patient and family considering hospice consult. Continue to follow as appropriate.     Jonathan Garcia, PT #[id number]  Darrion Matias, [address] [place] [address], OTR/L   [id number]

## 2020-10-04 NOTE — PROGRESS NOTES
Shift assessment completed, see flow sheet. Pt is alert and oriented x 3, disoriented to situation at times. Following commands with a RASS of +1 on RA.    NSR on monitor, , /58 (78), SpO2 99%. Respirations are easy, even, and unlabored. Bilateral lung sounds diminished with wheezes. PIV in place, WNL with NS infusing. Truong in place and patent with STAT lock. PM meds given. Pt continue to repeat herself even when nobody is in the room talking to her. BPs remaining stable. Call light within reach. Bed in lowest position. Bed alarm on. Will continue to monitor.

## 2020-10-04 NOTE — PROGRESS NOTES
Reassessment completed    Pt continues to wheeze despite breathing trx    Continues to repeat herself and talk while nobody is in the room. When asked if she needs anything she just states that she wants to go sleep.     HR staying in low 100s d/t restlessness

## 2020-10-04 NOTE — PROGRESS NOTES
RT called for a PRN breathing trx    Pt wheezing with a RR of 30 and HR of 113    Will continue to monitor

## 2020-10-04 NOTE — PROGRESS NOTES
Report given and care transferred to [facility], RN. Patient in stable condition at time of care transfer. Patient denies needs, has call light within reach. Bed alarm on.

## 2020-10-04 NOTE — PROGRESS NOTES
ipratropium-albuterol, medicated lip balm, sodium chloride flush, acetaminophen **OR** acetaminophen    Results:  CBC:   Recent Labs     10/01/20  2231 10/03/20  0510 10/04/20  0154   WBC 18.5* 23.6* 16.5*   HGB 11.1* 9.9* 9.1*   HCT 35.2* 33.2* 30.2*   MCV 87.3 93.4 93.2    193 182     BMP:   Recent Labs     10/03/20  0510 10/03/20  1932 10/04/20  0154    137 138   K 4.9 3.7 3.7    105 109   CO2 20* 19* 17*   PHOS  --   --  3.1   BUN 41* 43* 42*   CREATININE 1.7* 1.6* 1.6*     LIVER PROFILE:   Recent Labs     10/01/20  2231   AST 21   ALT 9*   BILITOT 0.5   ALKPHOS 92       Cultures:  10/2 BC NGTD  10/3 UC [facility]    Films:  10/2 Covid 19 Negative     CT chest 10/2 imaging was reviewed by me and showed   No acute cardiopulmonary abnormality  LLL atelectasis with Small left effusion       CXR 10/3 imaging was reviewed by me and showed   L basilar atelectasis   No acute findings     Echo 10/1    Limited exam for endocarditis and MVR. A bio-prosthetic mitral valve is well seated. Mild tricuspid regurgitation. Systolic pulmonary artery pressure (SPAP) is estimated at 46 mmHg consistent   with mild pulmonary hypertension (Right atrial pressure of 3 mmHg). No change from exam done 8/27/2020. No obvious valvular endocarditis. Consider JUDD if clinically indicated; these images are not diagnostic to r/o  vegetations. ASSESSMENT:  · Severe sepsis- unclear source ? UTI. Was on Macrobid as outpatient   · Acute kidney injury  · Bronchospasm ? CHF ?  Aspiration   · Lactic acidosis  · Paroxysmal A. 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  · Remote hemorrhagic CVA  · Alzheimer dementia at baseline    PLAN:  Supplemental oxygen to maintain SaO2 >92%; wean as tolerated  BIPAP PRN  Closely monitory airways, clinical status, cardiac rhythm, vital signs, urine output and NG output   D/C Maintenance IVF given worse respiratory status and low EF, watch Cr   Broad spectrum antibiotics to include Vancomycin, Cefepime D#2  Follow up Cx   Hold coreg and lasix   Inhaled bronchodilators  Speech pathology   Blood sugar control, with goal 140-180  DVT prophylaxis: Lovenox   MRSA prophylaxis: Bactroban  D/W son Yolanda Mosquera, patient is DNR CC arrest and considering hospice.

## 2020-10-04 NOTE — PROGRESS NOTES
Vancomycin Day # 3  Current dose = Pulse dosing per daily vancomycin levels. BUN/SRCR 42/1.6      Est CrCl = 20 ml/min  WBC   16.5            Tmax 98.7  Vancomycin random level this am = 21.9 mcg/ml  Will hold vancomycin dose today and recheck random level tomorrow am.  Pharmacy will continue to obtain daily random vancomycin levels and redose if level less than 18 mcg/ml.

## 2020-10-04 NOTE — PROGRESS NOTES
Admit: 10/1/2020    Name:  Luke Haque  Room:  [facility]-  MRN:    [id number]    Critical Care Daily Progress Note for 10/4/2020     Interval History:   Bp is on the low side    10/4  Has increased resp rate today with wheezing  On 2 L of O2    Scheduled Meds:   ipratropium-albuterol  1 ampule Inhalation TID    mupirocin   Nasal BID    vancomycin (VANCOCIN) intermittent dosing (placeholder)   Other RX Placeholder    atorvastatin  20 mg Oral Daily    aspirin  81 mg Oral Daily    escitalopram  10 mg Oral Daily    melatonin  3 mg Oral Nightly    pantoprazole  40 mg Oral Daily    cefepime  2 g Intravenous Q12H    sodium chloride flush  10 mL Intravenous 2 times per day    influenza virus vaccine  0.5 mL Intramuscular Once       Continuous Infusions:   sodium chloride 125 mL/hr at 10/03/20 2329       PRN Meds:  perflutren lipid microspheres, sodium chloride, ipratropium-albuterol, medicated lip balm, sodium chloride flush, acetaminophen **OR** acetaminophen                  Objective:     Temp  Av.5 °F (36.9 °C)  Min: 98.1 °F (36.7 °C)  Max: 98.8 °F (37.1 °C)  Pulse  Av.5  Min: 85  Max: 123  BP  Min: 81/68  Max: 137/98  SpO2  Av.7 %  Min: 87 %  Max: 100 %  Patient Vitals for the past 4 hrs:   BP Pulse Resp SpO2 Weight   10/04/20 0700 (!) 130/102 116 (!) 36 97 % --   10/04/20 0600 (!) 122/108 116 (!) 31 96 % 135 lb 9.3 oz (61.5 kg)   10/04/20 0526 -- -- -- 94 % --   10/04/20 0524 -- -- -- (!) 87 % --   10/04/20 0500 134/89 123 26 (!) 89 % --         Intake/Output Summary (Last 24 hours) at 10/4/2020 0858  Last data filed at 10/4/2020 0600  Gross per 24 hour   Intake 2993 ml   Output 900 ml   Net 2093 ml       Physical Exam:  Gen: mild resp distress. Alert. Elderly  female with dementia  Eyes: No sclera icterus. No conjunctival injection. Neck: Trachea midline. Resp: No accessory muscle use. No crackles. melody wheezes. No rhonchi. Diminished throughout,On RA  CV: Regular rate. Regular rhythm. No murmur. No rub. No edema. GI: Soft, Non-tender. Non-distended. Normal bowel sounds. Skin: Warm and dry. No rash on exposed extremities. Neuro: Awake. Grossly nonfocal    Psych: Oriented to place, time,but not to situation    Lab Data:  CBC:   Recent Labs     10/01/20  2231 10/03/20  0510 10/04/20  0154   WBC 18.5* 23.6* 16.5*   RBC 4.03 3.56* 3.24*   HGB 11.1* 9.9* 9.1*   HCT 35.2* 33.2* 30.2*   MCV 87.3 93.4 93.2   RDW 16.3* 17.4* 18.0*    193 182     BMP:   Recent Labs     10/03/20  0510 10/03/20  1932 10/04/20  0154    137 138   K 4.9 3.7 3.7    105 109   CO2 20* 19* 17*   PHOS  --   --  3.1   BUN 41* 43* 42*   CREATININE 1.7* 1.6* 1.6*     BNP: No results for input(s): BNP in the last 72 hours. PT/INR: No results for input(s): PROTIME, INR in the last 72 hours. APTT:No results for input(s): APTT in the last 72 hours. CARDIAC ENZYMES:   Recent Labs     10/01/20  2231   TROPONINI <0.01     FASTING LIPID PANEL:  Lab Results   Component Value Date    CHOL 148 01/22/2020    HDL 52 01/22/2020    HDL 30 01/11/2012    TRIG 114 01/22/2020     LIVER PROFILE:   Recent Labs     10/01/20  2231   AST 21   ALT 9*   BILITOT 0.5   ALKPHOS 92       ECHO   Limited exam for endocarditis and MVR. A bio-prosthetic mitral valve is well seated. Mild tricuspid regurgitation. Systolic pulmonary artery pressure (SPAP) is estimated at 46 mmHg consistent   with mild pulmonary hypertension (Right atrial pressure of 3 mmHg). No change from exam done 8/27/2020. No obvious valvular endocarditis. Consider JUDD if clinically indicated; these images are not diagnostic to r/o   vegetations.        Assessment & Plan:     Patient Active Problem List    Diagnosis Date Noted    Severe sepsis [facility]     Acute kidney injury ([facility]     Lactic acidosis     Chronic systolic congestive heart failure ([facility]     Febrile illness 10/02/2020    [facility] with [facility]     Sepsis ([facility] 08/25/2020    Flash pulmonary edema ([facility] 08/25/2020    Suspected COVID-19 virus infection 50/33/9468    Uncomplicated late onset Alzheimer's dementia ([facility] 12/13/2018    Pneumonia due to suspected gram-negative bacteria 09/08/2018    Old cerebral hemorrhage without late effect 02/07/2017    Anemia 09/03/2016    Hypertension     Chronic diastolic CHF (congestive heart failure) ([facility] 11/26/2014    Atrial fibrillation ([facility] 01/15/2012    S/P CABG (coronary artery bypass graft) 01/12/2012    S/P MVR (mitral valve replacement) 01/12/2012    CAD (coronary artery disease) 01/08/2012    Cardiomyopathy, ischemic 01/08/2012    NSTEMI (non-ST elevated myocardial infarction) ([facility] 01/07/2012     Severe Sepsis  - source unclear, CT chest showed small pleural effusion, UA neg  - with fevers, tachypnea, tachycardia, leukocytosis, hypotension and lactic acidosis  - Admit to PCU, tele  - flu and covid negative, check blood cx x2; PCT 0.33  - add molecular respiratory panel, strep pneumo and urine legionella  - trend LA and WBC  - abx: Vanc and Cefepime D#3, PRN Tylenol; given fluid bolus and started on IVF  - check echo- bioprosthetic valve looks good.   Worsening leucocytosis and lactate  IV fluid  Bolus   D/c fluids this am given hypoxia and some wheezing  Stat CXR  Possible aspiration  Speech eval.    BOGDAN    2 to severe sepsis   IVF   D/c IVF      Recent concern for UTI  - as pt was acting \"off\" and family reports she acts this way when she has a UTI  - was started on Macrobid by PCP  - urine cx <50,000 CFU/mL mixed skin/urogenital zachary     Paroxysmal Atrial Fibrillation  - currently in sinus  - on ASA, hold Coreg 2/2 low BP     Chronic Systolic CHF  Recent EF 19-24 %   - appears compensated  - low Na diet, daily weights, I&Os  - hold Lasix and Coreg 2/2 low BPs     CAD s/p CABG  - with ischemic CM; recent NSTEMI in 8/2020 - medical mgmt  - no c/o chest pain  - on ASA, Lipitor; hold Coreg 2/2 above     GERD  - cont Protonix     Bioprosthetic MVR  - on ASA     HTN  - BP low, see above  - hold Coreg, Lasix  - monitor     Remote Hemorrhagic CVA  - no issues     Dementia  - at baseline     DVT Prophylaxis: Lovenox  Diet: DIET GENERAL;  Code Status:DNR CCA  FAmily considering hospice   hospice consult      [facility]

## 2020-10-04 NOTE — PROGRESS NOTES
10/04/20 1358   Vital Signs   Temp 97.3 °F (36.3 °C)   Temp Source Oral   Pulse 106   Resp 20   BP (!) 113/50   BP Location Right upper arm   Patient Position Semi fowlers   Level of Consciousness 0   MEWS Score 2   Oxygen Therapy   SpO2 96 %   O2 Device Nasal cannula   O2 Flow Rate (L/min) 3 L/min     Patient arrived to unit in stable condition. Patient alert, and oriented only to herself. Patient oriented to room and call light. Patient has call light within reach. Family at bedside. Bed alarm on.

## 2020-10-05 VITALS
HEIGHT: 63 IN | TEMPERATURE: 98.3 F | WEIGHT: 141 LBS | HEART RATE: 95 BPM | BODY MASS INDEX: 24.98 KG/M2 | RESPIRATION RATE: 22 BRPM | SYSTOLIC BLOOD PRESSURE: 116 MMHG | OXYGEN SATURATION: 94 % | DIASTOLIC BLOOD PRESSURE: 59 MMHG

## 2020-10-05 LAB
BLOOD CULTURE, ROUTINE: NORMAL
GLUCOSE BLD-MCNC: 103 MG/DL (ref 70–99)
GLUCOSE BLD-MCNC: 108 MG/DL (ref 70–99)
GLUCOSE BLD-MCNC: 111 MG/DL (ref 70–99)
GLUCOSE BLD-MCNC: 98 MG/DL (ref 70–99)
PERFORMED ON: ABNORMAL
PERFORMED ON: NORMAL
SARS-COV-2, PCR: NOT DETECTED
VANCOMYCIN RANDOM: 17.5 UG/ML

## 2020-10-05 PROCEDURE — 2580000003 HC RX 258: Performed by: INTERNAL MEDICINE

## 2020-10-05 PROCEDURE — 94640 AIRWAY INHALATION TREATMENT: CPT

## 2020-10-05 PROCEDURE — 99232 SBSQ HOSP IP/OBS MODERATE 35: CPT | Performed by: INTERNAL MEDICINE

## 2020-10-05 PROCEDURE — 99239 HOSP IP/OBS DSCHRG MGMT >30: CPT | Performed by: INTERNAL MEDICINE

## 2020-10-05 PROCEDURE — 80202 ASSAY OF VANCOMYCIN: CPT

## 2020-10-05 PROCEDURE — 6370000000 HC RX 637 (ALT 250 FOR IP): Performed by: INTERNAL MEDICINE

## 2020-10-05 PROCEDURE — 6360000002 HC RX W HCPCS: Performed by: INTERNAL MEDICINE

## 2020-10-05 PROCEDURE — 2700000000 HC OXYGEN THERAPY PER DAY

## 2020-10-05 PROCEDURE — 92610 EVALUATE SWALLOWING FUNCTION: CPT

## 2020-10-05 PROCEDURE — 92526 ORAL FUNCTION THERAPY: CPT

## 2020-10-05 PROCEDURE — 94761 N-INVAS EAR/PLS OXIMETRY MLT: CPT

## 2020-10-05 PROCEDURE — 36415 COLL VENOUS BLD VENIPUNCTURE: CPT

## 2020-10-05 RX ORDER — LORAZEPAM 2 MG/ML
1 CONCENTRATE ORAL EVERY 8 HOURS PRN
Qty: 30 ML | Refills: 0 | Status: SHIPPED | OUTPATIENT
Start: 2020-10-05 | End: 2020-10-15

## 2020-10-05 RX ADMIN — ATORVASTATIN CALCIUM 20 MG: 10 TABLET, FILM COATED ORAL at 08:41

## 2020-10-05 RX ADMIN — ESCITALOPRAM OXALATE 10 MG: 10 TABLET ORAL at 08:41

## 2020-10-05 RX ADMIN — VANCOMYCIN HYDROCHLORIDE 500 MG: 500 INJECTION, POWDER, LYOPHILIZED, FOR SOLUTION INTRAVENOUS at 12:06

## 2020-10-05 RX ADMIN — IPRATROPIUM BROMIDE AND ALBUTEROL SULFATE 1 AMPULE: .5; 3 SOLUTION RESPIRATORY (INHALATION) at 03:02

## 2020-10-05 RX ADMIN — MELATONIN 3 MG ORAL TABLET 3 MG: 3 TABLET ORAL at 00:40

## 2020-10-05 RX ADMIN — PANTOPRAZOLE SODIUM 40 MG: 40 TABLET, DELAYED RELEASE ORAL at 08:41

## 2020-10-05 RX ADMIN — ASPIRIN 81 MG: 81 TABLET, CHEWABLE ORAL at 08:41

## 2020-10-05 RX ADMIN — IPRATROPIUM BROMIDE AND ALBUTEROL SULFATE 1 AMPULE: .5; 3 SOLUTION RESPIRATORY (INHALATION) at 11:08

## 2020-10-05 RX ADMIN — IPRATROPIUM BROMIDE AND ALBUTEROL SULFATE 1 AMPULE: .5; 3 SOLUTION RESPIRATORY (INHALATION) at 06:52

## 2020-10-05 RX ADMIN — Medication 10 ML: at 08:41

## 2020-10-05 RX ADMIN — CEFEPIME 2 G: 2 INJECTION, POWDER, FOR SOLUTION INTRAVENOUS at 00:40

## 2020-10-05 ASSESSMENT — PAIN SCALES - PAIN ASSESSMENT IN ADVANCED DEMENTIA (PAINAD)
CONSOLABILITY: 0
TOTALSCORE: 0
FACIALEXPRESSION: 0
BODYLANGUAGE: 0
NEGVOCALIZATION: 0
BREATHING: 0

## 2020-10-05 ASSESSMENT — PAIN - FUNCTIONAL ASSESSMENT: PAIN_FUNCTIONAL_ASSESSMENT: ADVANCED DEMENTIA

## 2020-10-05 NOTE — FLOWSHEET NOTE
10/05/20 1302   Encounter Summary   Services provided to: Patient   Referral/Consult From: [facility] Family members   [facility]   Continue Visiting   (10/5 Tel-pt.welcomed prayer to [facility] heal[facility])   Complexity of Encounter Moderate   Length of Encounter 15 minutes

## 2020-10-05 NOTE — CARE COORDINATION
DISCHARGE ORDER  Date/Time 10/5/2020 10:45 AM  Completed by: Dionicio Krabbe, Case Management    Patient Name: Paul Noel      : 1931  Admitting Diagnosis: Febrile illness [R50.9]  Febrile illness [R50.9]      Admit order Date and Status: IP 10/02/2020  (verify MD's last order for status of admission)      Noted discharge order. If applicable PT/OT recommendation at Discharge:   DME recommendation by PT/OT: AN  Confirmed discharge plan with daughter Mary Escalera at bedside  Discharge Plan: Pt will DC home in care of family today with [facility]. Per [place] will meet with pt and family upon their return home today. VIC/AVS and clinicals faxed to [facility]. Offered to set up transport for family however, daughter will transport patient via private car and per daughter, family will be at the residence to assist her into the house. [place] [address] notified of plan to DC with [facility]. Reviewed chart. Role of discharge planner explained and patient verbalized understanding. Discharge order is noted. Has Home O2 in place on admit:  no  Informed of need to bring portable home O2 tank on day of discharge for nursing to connect prior to leaving:   Not Indicated  Verbalized agreement/Understanding:   Not Indicated  Pt is being d/c'd to home in care of family with [facility] today. Family will transport via private car. Pt's O2 weaned off, sats are    % on RA. [facility]. All discharge needs and concerns addressed.

## 2020-10-05 NOTE — PROGRESS NOTES
Patient and daughter educated on discharge instructions as well as new medications use, dosage, administration and possible side effects. Paper prescription provided. Patient and daughter verified knowledge. IV removed without difficulty and dry dressing in place.

## 2020-10-05 NOTE — PROGRESS NOTES
Pulmonary & Critical Care Medicine Progress Note    CC: Sepsis/wheezing    Events of Last 24 hours:   Patient with significant wheezing. Vitals:  Blood pressure (!) 116/59, pulse 95, temperature 98.3 °F (36.8 °C), temperature source Oral, resp. rate 22, height 5' 3\" (1.6 m), weight 141 lb (64 kg), SpO2 100 %. on RA  Temp  Av °F (36.7 °C)  Min: 97.3 °F (36.3 °C)  Max: 98.6 °F (37 °C)    Intake/Output Summary (Last 24 hours) at 10/5/2020 1109  Last data filed at 10/5/2020 1042  Gross per 24 hour   Intake 50 ml   Output 500 ml   Net -450 ml     EXAM:  Gen: +Mild distress. NCAT  Eyes: PERRL. No sclera icterus. No conjunctival injection. ENT: No discharge. Pharynx clear. Neck: Trachea midline. No obvious mass. Resp: + accessory muscle use. No crackles. BIlateral wheezes. No rhonchi. No dullness on percussion. CV: Regular rate. Regular rhythm. No murmur or rub. No edema. GI: Non-tender. Non-distended. No hernia. Skin: Warm and dry. No nodule on exposed extremities. Lymph: No cervical LAD. No supraclavicular LAD. M/S: No cyanosis. No joint deformity. No clubbing. Neuro: Awake. Alert. Moves all four extremities. Psych: Oriented x 1-2. Confused. No anxiety.      Scheduled Meds:   vancomycin  500 mg Intravenous Q12H    ipratropium-albuterol  1 ampule Inhalation Q4H    mupirocin   Nasal BID    vancomycin (VANCOCIN) intermittent dosing (placeholder)   Other RX Placeholder    atorvastatin  20 mg Oral Daily    aspirin  81 mg Oral Daily    escitalopram  10 mg Oral Daily    melatonin  3 mg Oral Nightly    pantoprazole  40 mg Oral Daily    cefepime  2 g Intravenous Q12H    sodium chloride flush  10 mL Intravenous 2 times per day    influenza virus vaccine  0.5 mL Intramuscular Once     PRN Meds:  perflutren lipid microspheres, sodium chloride, ipratropium-albuterol, medicated lip balm, sodium chloride flush, acetaminophen **OR** acetaminophen    Results:  CBC:   Recent Labs 10/03/20  0510 10/04/20  0154   WBC 23.6* 16.5*   HGB 9.9* 9.1*   HCT 33.2* 30.2*   MCV 93.4 93.2    182     BMP:   Recent Labs     10/03/20  0510 10/03/20  1932 10/04/20  0154    137 138   K 4.9 3.7 3.7    105 109   CO2 20* 19* 17*   PHOS  --   --  3.1   BUN 41* 43* 42*   CREATININE 1.7* 1.6* 1.6*     LIVER PROFILE:   No results for input(s): AST, ALT, LIPASE, BILIDIR, BILITOT, ALKPHOS in the last 72 hours. Invalid input(s): AMYLASE,  ALB    Cultures:  10/2 BC NGTD  10/3 UC Candida    Films:  10/2 Covid 19 Negative     CT chest 10/2 imaging showed   No acute cardiopulmonary abnormality  LLL atelectasis with Small left effusion       CXR 10/3 imaging showed   L basilar atelectasis   No acute findings     Echo 10/1    Limited exam for endocarditis and MVR. A bio-prosthetic mitral valve is well seated. Mild tricuspid regurgitation. Systolic pulmonary artery pressure (SPAP) is estimated at 46 mmHg consistent   with mild pulmonary hypertension (Right atrial pressure of 3 mmHg). No change from exam done 8/27/2020. No obvious valvular endocarditis. Consider JUDD if clinically indicated; these images are not diagnostic to r/o  vegetations. ASSESSMENT:  · Severe sepsis- unclear source ? UTI. Was on Macrobid as outpatient   · Acute kidney injury  · Bronchospasm ? CHF ? Aspiration   · Lactic acidosis  · Paroxysmal A. 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  · Remote hemorrhagic CVA  · Alzheimer dementia at baseline    PLAN:  Supplemental oxygen to maintain SaO2 >92%; wean as tolerated  Patients family member states she is going home with hospice. Please call with questions. I will SWITCH the dose or number of times a day I take the medications listed below when I get home from the hospital:  None

## 2020-10-05 NOTE — PROGRESS NOTES
Physician Progress Note      Danelle Carmona  CSN #:                  [id number]  :                       1931  ADMIT DATE:       10/1/2020 10:14 PM  [address] DATE:  RESPONDING  PROVIDER #:        Zuly Koehler MD          QUERY TEXT:    Pt admitted with sepsis. Pt noted to have RR 19-36, noted wheezing, +   accessory muscle use, and respiratory distress. If possible, please document   in the progress notes and discharge summary if you are evaluating and/or   treating any of the following: The medical record reflects the following:  Risk Factors: sepsis, CHF  Clinical Indicators: progress note 10/4- Has increased resp rate today with   wheezing, Pulm note 10/4 Resp: + accessory muscle use. No crackles. BIlateral   wheezes  Treatment: O2 support 2-3L, monitor labs/images, pulmonology consult, HHN    Thank You Harjit Dsouza RN, [email or web address]  Options provided:  -- Acute respiratory failure with hypoxia  -- Acute respiratory failure with hypercapnia  -- Chronic respiratory failure with hypoxia  -- Chronic respiratory failure with hypercapnia  -- Acute on chronic respiratory failure with hypoxia  -- Acute on chronic respiratory failure with hypercapnia  -- Other - I will add my own diagnosis  -- Disagree - Not applicable / Not valid  -- Disagree - Clinically unable to determine / Unknown  -- Refer to Clinical Documentation Reviewer    PROVIDER RESPONSE TEXT:    This patient is in acute respiratory failure with hypoxia.     Query created by: Yumi Rodriguez on 10/5/2020 9:33 AM      Electronically signed by:  Zuly Koehler MD 10/5/2020 9:44 AM

## 2020-10-05 NOTE — PLAN OF CARE
Problem: Nutrition  Intervention: Swallowing evaluation  Note: Education completed with pt and RN re: results and recommendations, role of SLP for dysphagia, signs and risks of aspiration,POC, benefits of self-feeing and oral care in decreasing adverse outcomes associated with aspiration, compensatory strategies for use with pts with dementia (contrasting plate/placemat colors, careful self-feeding, well-lit room, distractions limited, dentures in place, smaller more frequent meals, alternating textures and role of strong flavors, carbonation  in sensory stimulation)  Intervention: Aspiration precautions  Note: ducation completed with pt and RN re: results and recommendations, role of SLP for dysphagia, signs and risks of aspiration,POC, benefits of self-feeing and oral care in decreasing adverse outcomes associated with aspiration, compensatory strategies for use with pts with dementia (contrasting plate/placemat colors, careful self-feeding, well-lit room, distractions limited, dentures in place, smaller more frequent meals, alternating textures and role of strong flavors, carbonation  in sensory stimulation)

## 2020-10-05 NOTE — PROGRESS NOTES
Shift assessment completed, see flowsheets. Oxygen removed from patient, o2 saturations >90% on room air. AM medications given per orders. Patient denies pain or further needs at this time. Patient currently awake in bed, repositioned for comfort, call light and personal belongings within reach. Patient educated on use of call light and to call out with needs, verbalized understanding.

## 2020-10-05 NOTE — DISCHARGE SUMMARY
Name:  Noman Murdock  Room:  3857/8250-40  MRN:    [id number]    Discharge Summary      This discharge summary is in conjunction with a complete physical exam done on the day of discharge. Attending Physician: Dr. Tiffani Montes De Oca  Discharging Physician: Dr. Aracely Franklin: 10/1/2020  Discharge:   10/5/2020    HPI:  The patient is a 80 y.o. female with a PMH of CAD s/p CABG, Atrial Fibrillation, HTN, Alzheimer's Dementia, S/p MVR, Chronic Diastolic CHF, hx of CVA who presented to [facility] ED with complaint of fevers with associated SOB and wheezing, nausea and vomiting. Pt is a poor historian d/t a history of dementia - all hx was obtained from son. Pt has had N/V without abdominal pain and fevers >100 at home. She does have a hx of CHF and exposure to second hand smoke. She is a never smoker. Family denies any covid exposures, LE swelling, orthopnea, weight gain, reported chest pain or palpitations, flank pain, wounds or concerns for skin infections. She has been generally more week over the last couple of days. Of note, pt was started on Macrobid for a presumed UTI by PCP on 9/30/2020.      Work up in the ED showed: fever of 102.1, increased RR, tachypnea and low BPs. Pt on room air. WBC was 18.5. Lactic acid of 2. PCT 0.33. ProBNP: 5957, trop neg. Flu was negative. COVID test neg. UA negative. CT chest showed a small left pleural effusion w/o acute cardiopulmonary disease. Diagnoses this Admission and Hospital Course   Severe Sepsis  - source unclear, CT chest showed small pleural effusion, UA neg  - with fevers, tachypnea, tachycardia, leukocytosis, hypotension and lactic acidosis  - Admitted to PCU, tele  - flu and covid negative, check blood cx x2; PCT 0.33  - added molecular respiratory panel, strep pneumo and urine legionella  - trended LA and WBC  - abx: Vanc and Cefepime D#4, PRN Tylenol; given fluid bolus and started on IVF  - checked echo- bioprosthetic valve looks good.   Worsening leucocytosis and lactate  IV fluid  Bolus   D/c fluids this am given hypoxia and some wheezing  Stat CXR  Possible aspiration  Speech eval. Dysphagia pureed solids/thin liquids, meds crushed in puree. Acute hypoxic respiratory failure  - tachypnea, accessory muscle use  - on 2-3 L O2.      BOGDAN    - Secondary  to severe sepsis   IVF   D/c IVF with hypoxia, wheezing.      Recent concern for UTI  - as pt was acting \"off\" and family reports she acts this way when she has a UTI  - was started on Macrobid by PCP  - urine cx <50,000 CFU/mL mixed skin/urogenital zachary     Paroxysmal Atrial Fibrillation  - currently in sinus  - on ASA, held Coreg 2/2 low BP     Chronic Systolic CHF  Recent EF 56-65 %   - appears compensated  - low Na diet, daily weights, I&Os  - held Lasix and Coreg 2/2 low BPs     CAD s/p CABG  - with ischemic CM; recent NSTEMI in 8/2020 - medical mgmt  - no c/o chest pain  - on ASA, Lipitor; held Coreg 2/2 above     GERD  - cont'd Protonix     Bioprosthetic MVR  - on ASA     HTN  - BP low, see above  - held Coreg, Lasix  - monitored     Remote Hemorrhagic CVA  - no issues     Dementia  - at baseline     DVT Prophylaxis: Lovenox  Code Status:DNR CCA    Hospice consult. D/c home with [facility].        Procedures (Please Review Full Report for Details)  N/A    Consults    Pulmonology       Physical Exam at Discharge:    BP (!) 116/59   Pulse 95   Temp 98.3 °F (36.8 °C) (Oral)   Resp 22   Ht 5' 3\" (1.6 m)   Wt 141 lb (64 kg)   SpO2 94%   BMI 24.98 kg/m²     Gen: mild resp distress. Alert. Elderly  female with dementia  Eyes: No sclera icterus. No conjunctival injection. Neck: Trachea midline. Resp: No accessory muscle use. No crackles. melody wheezes. No rhonchi. Diminished throughout,On RA  CV: Regular rate. Regular rhythm. No murmur.  No rub. No edema. GI: Soft, Non-tender. Non-distended. Normal bowel sounds. Skin: Warm and dry. No rash on exposed extremities. Neuro: Awake.  Grossly nonfocal    Psych: Oriented to place, time,but not to situation    CBC:   Recent Labs     10/03/20  0510 10/04/20  0154   WBC 23.6* 16.5*   HGB 9.9* 9.1*   HCT 33.2* 30.2*   MCV 93.4 93.2    182     BMP:   Recent Labs     10/03/20  0510 10/03/20  1932 10/04/20  0154    137 138   K 4.9 3.7 3.7    105 109   CO2 20* 19* 17*   PHOS  --   --  3.1   BUN 41* 43* 42*   CREATININE 1.7* 1.6* 1.6*       U/A:    Lab Results   Component Value Date    NITRITE neg 09/30/2020    COLORU Yellow 10/02/2020    WBCUA 10-20 01/17/2019    RBCUA 3-5 01/17/2019    MUCUS 1+ 09/02/2016    BACTERIA 1+ 01/17/2019    CLARITYU Clear 10/02/2020    SPECGRAV 1.010 10/02/2020    LEUKOCYTESUR Negative 10/02/2020    BLOODU Negative 10/02/2020    GLUCOSEU Negative 10/02/2020    GLUCOSEU NEGATIVE 01/10/2012       CULTURES  Rapid influenza: negative  Blood: NGTD  Urine: 75 K CFU/mL Candida utilis  Strep pneumoniae/legionella: negative  COVID: not detected    RADIOLOGY  XR CHEST PORTABLE   Final Result   No acute cardiopulmonary abnormality identified. Chronic atelectasis and/or   scarring in the left lung base. Right basilar atelectasis. CT CHEST WO CONTRAST   Final Result   No acute cardiopulmonary abnormality. Small left pleural effusion. Cholelithiasis. XR CHEST PORTABLE   Final Result   COPD, with no acute infiltrate identified. Resolved bibasilar infiltrates   when compared to the previous study. ECHO   Limited exam for endocarditis and MVR.   A bio-prosthetic mitral valve is well seated.   Mild tricuspid regurgitation.   Systolic pulmonary artery pressure (SPAP) is estimated at 46 mmHg consistent   with mild pulmonary hypertension (Right atrial pressure of 3 mmHg).   No change from exam done 8/27/2020.   No obvious valvular endocarditis.   Consider JUDD if clinically indicated; these images are not diagnostic to r/o   vegetations.         Discharge Medications     Medication List      START taking these medications    LORazepam 2 MG/ML concentrated solution  Commonly known as:  ATIVAN  Take 0.5 mLs by mouth every 8 hours as needed (anxiety) for up to 10 days. Notes to patient:  Ativan  Use: sedative, anti-anxiety  Side effects: drowsiness, dizziness, headache        CONTINUE taking these medications    Allegra 180 MG tablet  Generic drug:  fexofenadine     aspirin 81 MG tablet     atorvastatin 20 MG tablet  Commonly known as:  LIPITOR  TAKE ONE TABLET BY MOUTH DAILY     carvedilol 6.25 MG tablet  Commonly known as:  COREG  TAKE ONE TABLET BY MOUTH TWICE A DAY WITH MEALS     escitalopram 10 MG tablet  Commonly known as:  LEXAPRO  Take 1 tablet by mouth daily     lisinopril 5 MG tablet  Commonly known as:  PRINIVIL;ZESTRIL  Take 1 tablet by mouth daily     melatonin 3 MG Tabs tablet  Commonly known as:  RA Melatonin  Take 1 tablet by mouth daily     pantoprazole 40 MG tablet  Commonly known as:  PROTONIX  TAKE ONE TABLET BY MOUTH DAILY        STOP taking these medications    furosemide 40 MG tablet  Commonly known as:  LASIX     nitrofurantoin (macrocrystal-monohydrate) 100 MG capsule  Commonly known as:  MACROBID           Where to Get Your Medications      You can get these medications from any pharmacy    Bring a paper prescription for each of these medications  · LORazepam 2 MG/ML concentrated solution           Discharged home with [facility].        More than 30 mts spent      [address] 10/6/2020 6:49 PM

## 2020-10-05 NOTE — PROGRESS NOTES
Speech Language Pathology  Facility/Department: [address] [facility], [place], operated by [facility]   CLINICAL BEDSIDE SWALLOW EVALUATION    NAME: Kaleigh Escalante  : 1931  MRN: [id number]     Recommend initiate Dysphagia Pureed Solids/Thin Liquids, meds crushed in puree. Recommend upright position during and 30 minutes after meals, small bites/sips, slow rate of po intake, oral care before and after meals, and encourage self-feeding. Pt may benefit from strategies for use with pts with dementia including contrasting plate/placemat colors, careful self-feeding, well-lit room, distractions limited, dentures in place, smaller more frequent meals, alternating textures and role of strong flavors, carbonation  in sensory stimulation. Pt may indeed be able to tolerate minced and moist solids with supervision and cues from caregiver to clear oral cavity. ST to follow for education and to ensure diet tolerance for increased safety and independence with po intake. 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  ONSET DATE:10/01/1010    Recent Chest Xray/CT of Chest: (10/04/2020)  No acute cardiopulmonary abnormality identified.  Chronic atelectasis and/or    scarring in the left lung base.  Right basilar atelectasis. Date of Eval: 10/5/2020  Evaluating Therapist: Lanette Minaya    Current Diet level:  Current Diet : NPO  Current Liquid Diet : NPO      Primary Complaint  Patient Complaint: Dry mouth. Pain:  Pain Assessment  Pain Assessment: Advanced Dementia  Pain Level: 0  RASS Score: Alert and calm  PAINAD (Pain Assessment in Advance Dementia)  Breathing: normal  Negative Vocalization: none  Facial Expression: smiling or inexpressive  Body Language: relaxed  Consolability: no need to console  PAINAD Score: 0    Reason for Referral  Pato Spencer was referred for a bedside swallow evaluation to assess the efficiency of her swallow function, identify signs and symptoms of aspiration and make recommendations regarding safe dietary consistencies, effective compensatory strategies, and safe eating environment. Impression    Per MD note, \"The patient is a 80 y.o. female with a PMH of CAD s/p CABG, Atrial Fibrillation, HTN, Alzheimer's Dementia, S/p MVR, Chronic Diastolic CHF, hx of CVA who presented to [facility] ED with complaint of fevers with associated SOB and wheezing, nausea and vomiting. Pt is a poor historian d/t a history of dementia - all hx was obtained from son. Pt has had N/V without abdominal pain and fevers >100 at home. She does have a hx of CHF and exposure to second hand smoke. She is a never smoker. Family denies any covid exposures, LE swelling, orthopnea, weight gain, reported chest pain or palpitations, flank pain, wounds or concerns for skin infections. She has been generally more week over the last couple of days. Of note, pt was started on Macrobid for a presumed UTI by PCP on 9/30/2020.      Work up in the ED showed: fever of 102.1, increased RR, tachypnea and low BPs. Pt on room air. WBC was 18.5. Lactic acid of 2. PCT 0.33. ProBNP: 5957, trop neg. Flu was negative. COVID test neg. UA negative.  CT chest showed a small left pleural effusion w/o acute cardiopulmonary disease. \"    Laly/RN okays entry of SLP for BSE. Laly/RN reports pt is DNR-CCA and family is considering hospice upon d/c but wish to have swallow eval prior to d/c. Pt orients to  Self, not place, time, or recent events. States she is at [place] but repeats [facility] after cue. Pt asks if she will be staying or going home. Pt on O2 at 2L per NC. Pt has expiratory wheeze prior to po presentations which is pt's baseline per RN. RR ranges from 28-40 prior to po presentations and remains unchanged with po intake. Oral care is completed by SLP prior to po presentations. Upper denture place. Pt has no lower dentures. Oral motor exam is unremarkable with exception of dry mouth. Pt assessed with ice chips, thin liquids by tsp, cup, and straw, puree, and solids. Pt demonstrates impaired mastication of solids likely d/t absent lower dentition, decreased A-P bolus transit with minced and moist solids, and decreased lingual sensation resulting in increased time required for oral prep and transit of minced and moist solids and residue on tongue after the swallow. Pt is somewhat aware of residue and attempts to clear residue independently with some but not all residue cleared. With cue to clear oral cavity, pt is able to completely clear minced and moist residue. Oral prep and transit appear timely and complete with thin liquids and solids. No overt clinical signs of aspiration are observed with thin liquids, puree, or minced and moist solids. Pt has audible expiratory wheeze and increased RR (28-40) at baseline which is observed prior to po intake and appears unchanged after po intake.     Assessment: Mild Oral Dysphagia characterized by impaired mastication of solids likely d/t absent lower dentition, decreased A-P bolus transit with minced and moist solids, and decreased lingiual sensation resulting in increased time required for oral prep and transit of minced and moist solids and residue on tongue after the swallow. No overt clinical signs of aspiration are observed with thin liquids, puree, or solids. Recommend initiate Dysphagia Pureed Solids/Thin Liquids, meds crushed in puree. Recommend upright position during and 30 minutes after meals, small bites/sips, slow rate of po intake, oral care before and after meals, and encourage self-feeding. Pt may benefit from strategies for use with pts with dementia including contrasting plate/placemat colors, careful self-feeding, well-lit room, distractions limited, dentures in place, smaller more frequent meals, alternating textures and role of strong flavors, carbonation  in sensory stimulation. Pt may indeed be able to tolerate minced and moist solids with supervision and cues from caregiver to clear oral cavity. ST to follow for education and to ensure diet tolerance for increased safety and independence with po intake. Dysphagia Diagnosis: Mild oral stage dysphagia  Dysphagia Impression : Mild Oral Dysphagia; No overt clinical signs of aspiration are observed with thin liquids, puree, or solids. Dysphagia Outcome Severity Scale: Level 4: Mild moderate dysphagia- Intermittent supervision/cueing. One - two diet consistencies restricted     Treatment Plan  Requires SLP Intervention: Yes  Duration/Frequency of Treatment: 1-2x during LOS  D/C Recommendations: To be determined       Recommended Diet and Intervention  Diet Solids Recommendation: Dysphagia Pureed (Dysphagia I)  Liquid Consistency Recommendation: Thin  Recommended Form of Meds: Crushed in puree as able  Recommendations: Dysphagia treatment  Therapeutic Interventions: Diet tolerance monitoring;Patient/Family education    Compensatory Swallowing Strategies  Compensatory Swallowing Strategies: Upright as possible for all oral intake;Remain upright for 30-45 minutes after meals;Small bites/sips;Eat/Feed slowly; Other (comments); Assist feed(Oral care before and after po intake; encouage self-feeding; Deficits  Oral/Motor  Oral Motor: Exceptions to [facility]  Lingual Sensation: Reduced    Oral Phase Dysfunction  Oral Phase  Oral Phase: WNL  Oral Phase Dysfunction  Impaired Mastication: Soft Solid  Decreased Anterior to Posterior Transit: Soft solid  Lingual/Palatal Residue: Soft solid  Oral Phase  Oral Phase - Comment: Mild Oral Dysphagia characterized by impaired mastication of solids likely d/t absent lower dentition, decreased A-P bolus transit with minced and moist solids, and decreased lingiual sensation resulting in increased time required for oral prep and transit of minced and moist solids and residue on tongue after the swallow. Pt is somewhat aware of residue and attempts to clear residue indpendently with some but not all residue cleared. With cue to clear oral cavity, pt is able to completely clear minced and moist residue. Oral prep and transit appear timely and complete with thin liquids and solids. Indicators of Pharyngeal Phase Dysfunction   Pharyngeal Phase   Pharyngeal: No overt clinical signs of aspiration are observed with thin liquids, puree, or minced and moist solids. Pt has audible expiratory wheeze and increased RR (28-40) at baseline which is observed prior to po intake and appears unchanged after po intake.     Prognosis  Prognosis  Prognosis for safe diet advancement: guarded  Barriers to reach goals: cognitive deficits  Individuals consulted  Consulted and agree with results and recommendations: Patient;RN    Education  Patient Education: Education completed with ptand RN re: results and recommendations, role of SLP for dysphagia, POC, benefits of self-feeing and oral care in decreasing adverse outcomes associated with aspiration, compensatory strategies for use with pts with dementia (contrasting plate/placemat colors, careful self-feeding, well-lit room, distractions limited, dentures in place, smaller more frequent meals, alternating textures and role of strong flavors, carbonation in sensory stimulation)  Patient Education Response: No evidence of learning  Safety Devices in place: Yes  Type of devices: All fall risk precautions in place; Bed alarm in place;Call light within reach; Left in bed;Nurse notified       Therapy Time  SLP Individual Minutes  Time In: 0800  Time Out: [address]  Minutes: [address] Yaron Chavez M.A.  JULIOCESAR-SLP  Speech-Language Pathologist    AMILCAR Bill  10/5/2020 8:59 AM

## 2020-10-05 NOTE — DISCHARGE INSTR - COC
Continuity of Care Form    Patient Name: Yuri Mondragon   :  1931  MRN:  [id number]    Admit date:  10/1/2020  Discharge date: 10/05/2020    Code Status Order: DNR-CCA   Advance Directives:   [address] [facility] Documentation     Date/Time Healthcare Directive Type of Healthcare Directive Copy in [address] [facility] [address] Agent's Name Healthcare Agent's Phone Number    10/02/20 [phone]  Yes, patient has an advance directive for healthcare treatment  Durable power of  for health care  --  Adult Children  Abebe Mcknight  [phone]          Admitting Physician:  Jordan Schwab MD  PCP: Amirah Lacy MD    Discharging Nurse: Yunier Jenkins Unit/Room#: 2997/1175-86  Discharging Unit Phone Number: [phone]    Emergency Contact:   Extended Emergency Contact Information  Primary Emergency Contact: [address] [facility] [address] [facility] Phone: [phone]  Mobile Phone: [phone]  Relation: Child  Secondary Emergency Contact: [address] [facility] Phone: [phone]  Relation: Child    Past Surgical History:  Past Surgical History:   Procedure Laterality Date    CARDIAC SURGERY      CORONARY ARTERY BYPASS GRAFT  12    DILATION AND CURETTAGE OF UTERUS      ENDOSCOPY, COLON, DIAGNOSTIC      JOINT REPLACEMENT      MITRAL VALVE REPLACEMENT  12    porcine valve    MOUTH SURGERY      TRACHEOSTOMY TUBE PLACEMENT      UPPER GASTROINTESTINAL ENDOSCOPY  10/03/2016       Immunization History:   Immunization History   Administered Date(s) Administered    Influenza 2013    Influenza Virus Vaccine 10/01/2012, 10/07/2015    Influenza, High Dose (Fluzone 65 yrs and older) 2016, 2017, 2018, 2019    Pneumococcal Conjugate 13-valent ([id number]) 2016    Pneumococcal Polysaccharide ([id number]) 2012    Tdap (Boostrix, Adacel) 2019    Zoster Live (Zostavax) 2014       Active Problems:  Patient Active Problem Worker signature: Electronically signed by Solitario Mendoza RN on 10/5/20 at 10:48 AM EDT    PHYSICIAN SECTION    Prognosis: {Prognosis:4574035050}    Condition at Discharge: [address] Patient Condition:[id number]}    Rehab Potential (if transferring to Rehab): {Prognosis:4723967939}    Recommended Labs or Other Treatments After Discharge: ***    Physician Certification: I certify the above information and transfer of Kyle Friends  is necessary for the continuing treatment of the diagnosis listed and that she requires Hospice for greater 30 days.      Update Admission H&P: Changes in H&P as follows - see attached    PHYSICIAN SIGNATURE:  Electronically signed by Solitario Mendoza RN on 10/5/20 at 10:49 AM EDT

## 2020-10-06 ENCOUNTER — CARE COORDINATION (OUTPATIENT)
Dept: CASE MANAGEMENT | Age: 85
End: 2020-10-06

## 2020-10-06 LAB — CULTURE, BLOOD 2: NORMAL

## 2020-10-06 NOTE — CARE COORDINATION
[address] Transitions Initial Follow Up Call    Call within 2 business days of discharge: Yes    Patient: Earl Rocha Patient : 1931   MRN: [id number]  Discharge Date: 10/5/20   RARS: Readmission Risk Score: 24      Last Discharge [address]       Complaint Diagnosis Description Type Department Provider    10/1/20 Fever Acute respiratory failure with hypoxia ([address] . .. ED to Hosp-Admission (Discharged) (ADMITTED) [facility] 2 Gallo Kumar MD; Cristi Bernal. .. Spoke with: patient's daughter    Daughter confirms Pepe Mccord has completed enrollment visit and they expect SN visit again this afternoon. Denies needs. CTN contact provided for future needs. No further CTN outreach scheduled as patient newly enrolled in hospice services.      Follow Up  Future Appointments   Date Time Provider Jocelynn Alberts   10/16/2020  8:30 AM Alton Nelson MD [facility] None   2020  2:00 PM Alton Nelson MD [facility] None       Barbara Hood RN

## 2021-01-05 ENCOUNTER — TELEPHONE (OUTPATIENT)
Dept: INTERNAL MEDICINE CLINIC | Age: 86
End: 2021-01-05

## 2025-07-19 NOTE — H&P
Encounter Date: 7/19/2025       History     Chief Complaint   Patient presents with    Dizziness     States she has been dizzy for years. Pt had fall yesterday and was seen at this ER. States she called ortho doctor today and nurse on hotline told her to come to ER today to be reevaluated.     Beatriz Gan is a 71 y.o. female presenting for evaluation of persistent right-sided headache, lightheadedness, right knee pain, right foot pain after falling yesterday.  She was evaluated here in the emergency department and subsequently discharged home.  She called her primary care provider today and they recommended she come to the emergency department for further evaluation.  She does take baby aspirin daily, after having a right total knee replacement performed 2 months ago.  No fever.  No abdominal pain, but did have some nausea and vomiting after discharged home yesterday.  No chest pain or palpitations.  No difficulty breathing or shortness of breath.  She has a past medical history of Alcohol dependence, Asthma, Cirrhosis of liver, DDD (degenerative disc disease), lumbosacral, DJD (degenerative joint disease), lumbosacral, Encounter for blood transfusion, GERD (gastroesophageal reflux disease), Gout, HCV: treated / cured (SVR 5/2025), Hypercholesterolemia, Hypertension, NATHALIE (iron deficiency anemia), Kidney carcinoma, left (2014), Pneumonia, Renal cell carcinoma, Seizures, and Shingles (10/13/2012).      The history is provided by the patient.     Review of patient's allergies indicates:   Allergen Reactions    Phenergan [promethazine] Hives and Rash    Latex, natural rubber Hives     Per pt report-many years    Morphine Hives     Past Medical History:   Diagnosis Date    Alcohol dependence     Asthma     Cirrhosis of liver     DDD (degenerative disc disease), lumbosacral     DJD (degenerative joint disease), lumbosacral     Encounter for blood transfusion     GERD (gastroesophageal reflux disease)     Gout   Hospital Medicine History & Physical      PCP: Wilbert Sosa MD    Date of Service: Pt seen/examined on 9/8/18 and admitted on 9/8/18 to Inpatient    Chief Complaint   Patient presents with    Shortness of Breath     Pt states that she got really short of breath this morning. Denies hx of COPD or CHF. Pt was tachypnic and wheezing upon arrival. RA O2 sat was 92%. She was also c/o dizziness this morning but denies it at this time. History Of Present Illness: The patient is a 80 y.o. female with PMH below, presents with SOB/DEWITT, lightheadedness, wheezing, fatigue,  orthopnea, non-productive cough. The above sx have been progressive over the last couple of days. She was concerned that her CHF might be acting up so she had her family bring her to the ER. She has been requiring 2L of O2 here to maintain sats. She is not normally on O2. She was tachypnic in the 30's upon arrival to the ED. Past Medical History:        Diagnosis Date    Acute blood loss anemia 1/13/2012    Atrial fib/flutter, transient     CAD (coronary artery disease)     Cardiogenic shock ([address] 1/6/2012    [facility]     Chronic diastolic CHF (congestive heart failure) ([address] 11/26/2014    Encephalopathy, metabolic 9/15/6148    WNL EEG and CT HEAD.      History of blood transfusion     Hypertension     MI, old 01/12    Mitral regurgitation 1/8/2012    No history of procedure 10/03/2016    no history of colonoscopy    NSTEMI (non-ST elevated myocardial infarction) ([address] 1/7/2012    Old cerebral hemorrhage without late effect 2/7/2017    Pneumonia 2/6/2013    Respiratory failure, acute ([address] 1/7/2012    Unspecified cerebral artery occlusion with cerebral infarction        Past Surgical History:        Procedure Laterality Date    CARDIAC SURGERY      CORONARY ARTERY BYPASS GRAFT  1/12/12    DILATION AND CURETTAGE OF UTERUS      MITRAL VALVE REPLACEMENT  1/12/12    porcine    HCV: treated / cured (SVR 5/2025)     Hypercholesterolemia     Hypertension     NATHALIE (iron deficiency anemia)     questionable white coat hypertension    Kidney carcinoma, left 2014    Pneumonia     Renal cell carcinoma     Seizures     Shingles 10/13/2012     Past Surgical History:   Procedure Laterality Date    ANTERIOR CERVICAL DISCECTOMY W/ FUSION N/A 5/4/2023    Procedure: DISCECTOMY, SPINE, CERVICAL, ANTERIOR APPROACH, WITH FUSION C3-4;  Surgeon: Ruben Martinez MD;  Location: [facility] OR;  Service: Orthopedics;  Laterality: N/A;    APPENDECTOMY      ARTHROSCOPY OF SHOULDER WITH DECOMPRESSION OF SUBACROMIAL SPACE Left 10/31/2019    Procedure: ARTHROSCOPY, SHOULDER, WITH SUBACROMIAL SPACE DECOMPRESSION;  Surgeon: Ruben Martinez MD;  Location: [facility] OR;  Service: Orthopedics;  Laterality: Left;    BLADDER REPAIR      x 2    COLONOSCOPY  9/2011    COLONOSCOPY N/A 9/18/2024    Procedure: COLONOSCOPY;  Surgeon: Ceasar Calero MD;  Location: [facility];  Service: Endoscopy;  Laterality: N/A;    DISTAL CLAVICLE EXCISION Left 10/31/2019    Procedure: EXCISION, CLAVICLE, DISTAL;  Surgeon: Ruben Martinez MD;  Location: [facility] OR;  Service: Orthopedics;  Laterality: Left;    EPIDURAL STEROID INJECTION INTO LUMBAR SPINE N/A 12/7/2020    Procedure: Injection-steroid-epidural-lumbar;  Surgeon: Dk Rosales MD;  Location: [facility] OR;  Service: Pain Management;  Laterality: N/A;  L4-L5    EPIDURAL STEROID INJECTION INTO LUMBAR SPINE N/A 5/17/2021    Procedure: Injection-steroid-epidural-lumbar;  Surgeon: Dk Rosales MD;  Location: [facility] OR;  Service: Pain Management;  Laterality: N/A;  L4-L5    ESOPHAGOGASTRODUODENOSCOPY  9/2011    ESOPHAGOGASTRODUODENOSCOPY N/A 9/18/2024    Procedure: EGD (ESOPHAGOGASTRODUODENOSCOPY);  Surgeon: Ceasar Calero MD;  Location: [facility];  Service: Endoscopy;  Laterality: N/A;    EYE SURGERY      lasix bilateral    FIXATION KYPHOPLASTY N/A 1/31/2020    Procedure: Kyphoplasty T12;   valve    MOUTH SURGERY      TRACHEOSTOMY TUBE PLACEMENT      UPPER GASTROINTESTINAL ENDOSCOPY  10/03/2016       Medications Prior to Admission:    Prior to Admission medications    Medication Sig Start Date End Date Taking? Authorizing Provider   lisinopril (PRINIVIL;ZESTRIL) 10 MG tablet Take 1 tablet by mouth daily 9/4/18  Yes Ariadna Monte MD   pantoprazole (PROTONIX) 40 MG tablet Take 1 tablet by mouth daily 9/4/18 3/3/19 Yes Ariadna Monte MD   furosemide (LASIX) 40 MG tablet TAKE ONE TABLET BY MOUTH DAILY 8/10/18  Yes Ariadna Monte MD   atorvastatin (LIPITOR) 20 MG tablet TAKE ONE TABLET BY MOUTH DAILY 7/19/18  Yes Ariadna Monte MD   carvedilol (COREG) 6.25 MG tablet Take 1 tablet by mouth 2 times daily (with meals) 8/11/17  Yes Ariadna Monte MD   aspirin 81 MG tablet Take 81 mg by mouth daily. Yes Historical Provider, MD   fexofenadine (ALLEGRA) 180 MG tablet Take 180 mg by mouth daily as needed. Yes Historical Provider, MD       Allergies:  Morphine; Warfarin; Cefuroxime; Levofloxacin [levofloxacin]; and Azithromycin    Social History:    TOBACCO:   reports that she is a non-smoker but has been exposed to tobacco smoke. She has never used smokeless tobacco.  ETOH:   reports that she does not drink alcohol. Family History:  Reviewed in detail and negative for DM, Early CAD, Cancer (except as below).  Positive as follows:    Family History   Problem Relation Age of Onset    Heart Disease Mother     High Blood Pressure Mother        REVIEW OF SYSTEMS:   Pertinent positives/negatives as follows: SOB/DEWITT, lightheadedness, wheezing, fatigue,  orthopnea, non-productive cough, and as discussed in HPI, otherwise a complete ROS performed and all other systems are negative  PHYSICAL EXAM PERFORMED:    BP 95/61   Pulse 90   Temp 97.8 °F (36.6 °C) (Oral)   Resp 20   Ht 5' 1\" (1.549 m)   Wt 137 lb 12.8 oz (62.5 kg)   SpO2 99%   BMI 26.04 kg/m²     GEN: A&Ox3, NAD. HEENT:  NC/AT,EOMI, MMM, no erythema/exudates or visible masses. CVS:  Normal S1,S2. RRR. Without M/G/R.   LUNG:   Scattered bilat wheezes, greatest in the lower fields. W/o rales or rhonchi. ABD:  Soft, ND/NT, BS+ x4. Without G/R.  EXT: 2+ pulses, no c/c/e. Brisk cap refill. PSY:  Thought process intact, affect appropriate. JUAN F:  CN III-XII intact, moves all 4 spontaneously, sensory grossly intact. SKIN: No rash or lesions on visible skin. Chart review shows recent radiographs:  Xr Chest Portable    Result Date: 9/8/2018  EXAMINATION: SINGLE XRAY VIEW OF THE CHEST 9/8/2018 9:51 am COMPARISON: September 2, 2016 HISTORY: ORDERING SYSTEM PROVIDED HISTORY: shortness of breath TECHNOLOGIST PROVIDED HISTORY: Reason for exam:->shortness of breath Ordering Physician Provided Reason for Exam: SOB Acuity: Acute Type of Exam: Initial FINDINGS: Small left lower lobe opacity is unchanged. Left upper lobe calcified granuloma is again seen. Apical pleural thickening is unchanged. Prior sternotomy. No cardiomegaly. Mild pulmonary edema. Mild pulmonary edema. Small left lower lobe opacity is nonspecific but may represent atelectasis.      EKG 12 lead [[id number]] Collected: 09/08/18 0846   Updated: 09/08/18 1215     Ventricular Rate 104 BPM    Atrial Rate 104 BPM    P-R Interval 100 ms    QRS Duration 88 ms    Q-T Interval 354 ms    QTc Calculation (Bazett) 465 ms    P Axis 37 degrees    R Axis -9 degrees    T Axis 32 degrees    Diagnosis Sinus tachycardia with short PRInferior infarct , age undeterminedAbnormal ECGConfirmed by Salvatore Dunn MD, Allyn Lucas ([id number]) on 9/8/2018 12:14:44 PM     CBC   Recent Labs      09/08/18   0948   WBC  16.9*   HGB  11.7*   HCT  36.2   PLT  198      RENAL  Recent Labs      09/08/18   0948   NA  138   K  4.3   CL  100   CO2  23   BUN  29*   CREATININE  1.2     LFT'S  Recent Labs      09/08/18   0948   AST  59*   ALT  46*   BILITOT  0.7   ALKPHOS  129     COAG  Recent Labs Surgeon: Dk Rosales MD;  Location: [facility] OR;  Service: Pain Management;  Laterality: N/A;    HERNIA REPAIR      hiatal hernai    HYSTERECTOMY      INJECTION OF ANESTHETIC AGENT AROUND NERVE Left 6/8/2020    Procedure: Block, Nerve;  Surgeon: Dk Rosales MD;  Location: [facility] OR;  Service: Pain Management;  Laterality: Left;  left genicular nerve block     KNEE ARTHROPLASTY Left 7/30/2020    Procedure: ARTHROPLASTY, KNEE LEFT;  Surgeon: Ruben Martinez MD;  Location: [facility] OR;  Service: Orthopedics;  Laterality: Left;  REP VALERY RUBY    KNEE ARTHROSCOPY W/ MENISCECTOMY Left 1/16/2020    Procedure: ARTHROSCOPY, KNEE, WITH MEDIAL MENISCECTOMY;  Surgeon: Ruben Martinez MD;  Location: [facility] OR;  Service: Orthopedics;  Laterality: Left;    LAPAROSCOPIC NISSEN FUNDOPLICATION      NEPHRECTOMY      LT partial    RADIOFREQUENCY ABLATION Left 6/19/2020    Procedure: Radiofrequency Ablation;  Surgeon: Dk Rosales MD;  Location: [facility] OR;  Service: Pain Management;  Laterality: Left;    RADIOFREQUENCY ABLATION OF LUMBAR MEDIAL BRANCH NERVE AT SINGLE LEVEL Bilateral 2/28/2020    Procedure: Radiofrequency Ablation, Nerve, Spinal, Lumbar, Medial Branch, 1 Level;  Surgeon: Dk Rosales MD;  Location: [facility] OR;  Service: Pain Management;  Laterality: Bilateral;  L3,L4,L5 - Burned at 80 degrees C. for 60 seconds x 2 each site    RADIOFREQUENCY ABLATION OF LUMBAR MEDIAL BRANCH NERVE AT SINGLE LEVEL Bilateral 10/19/2020    Procedure: Radiofrequency Ablation, Nerve, Spinal, Lumbar, Medial Branch, 1 Level;  Surgeon: Dk Rosales MD;  Location: [facility] OR;  Service: Pain Management;  Laterality: Bilateral;  L3, L4, L5    REFRACTIVE SURGERY      ROBOTIC ARTHROPLASTY, KNEE Right 5/22/2025    Procedure: ROBOTIC ARTHROPLASTY, KNEE, TOTAL;  Surgeon: Ruben Martinez MD;  Location: [facility] OR;  Service: Orthopedics;  Laterality: Right;  [place]    ROTATOR CUFF REPAIR Left 10/31/2019    Procedure: REPAIR, ROTATOR CUFF;   09/08/18   0948   INR  1.06     CARDIAC ENZYMES  Recent Labs      09/08/18   0948   TROPONINI  0.01     Lab Results   Component Value Date    PROBNP 1,370 (H) 09/08/2018    PROBNP 3,627 (H) 09/02/2016     LACTIC ACID  Recent Labs      09/08/18   0948  09/08/18   1202   LACTSEPSIS  2.3*  1.5     U/A:    Recent Labs      09/08/18   1029   LEUKOCYTESUR  Negative   BACTERIA  2+*   WBCUA  6-10*   COLORU  Yellow   RBCUA  5-10*   CLARITYU  SL CLOUDY*   SPECGRAV  1.010   BLOODU  TRACE-INTACT*   GLUCOSEU  Negative     PHYSICIAN CERTIFICATION    I certify that Trinh Broussard is expected to be hospitalized for 2 midnights based on the following assessment and plan:    ASSESSMENT/PLAN:  1. Severe sepsis (WBC, RR, HR, lactate), likely related to #3 and/or #4, no need for pressors at this time. F/u cx.    2. Acute respiratory failure with hypoxia, likely related to CAP, supplemental O2 to maintain SPO2 ? 92%, continuous pulse ox. Pt currently requiring 2L O2 to maintain sats. She is normally on home O2. Wean as tolerated. 3. CAP, IV Unasyn. PRN/RASHEL nebs, resp film array. 4. UTI, ABX as above, f/u cx. 5. CKD III, Cr 1.2, baseline 1.1, monitor. 6. Lactic acidosis, 2.3, 1.5 on repeat. 7. Leukocytosis, 16.9, likely related to #3 and/or #4, monitor. Holding off on IVF 2/2 CHF hx.    8. Hx CHF, cont home regimen. BNP is a bit elevated but has had significantly higher levels in the past.    9. HLD, cont home statin. 10. GERD, cont PPI. 11. CAD, cont ASA. DVT Prophylaxis: Lovenox  Diet: Cardiac  Code Status: Full Code  PT/OT Eval Status: Will order if needed and as patient condition allows  Dispo - Admit to inpatient PCU     Eulalio Swenson MD    Thank you Sherri Womack MD for the opportunity to be involved in this patient's care. If you have any questions or concerns please feel free to contact me via the [employer] at [phone].       This chart was generated using the [facility] Surgeon: Ruben Martinez MD;  Location: [facility] OR;  Service: Orthopedics;  Laterality: Left;  arthrex    SKIN BIOPSY      TOTAL ABDOMINAL HYSTERECTOMY W/ BILATERAL SALPINGOOPHORECTOMY  age 50    TRANSFORAMINAL EPIDURAL INJECTION OF STEROID Bilateral 7/30/2021    Procedure: Injection,steroid,epidural,transforaminal approach;  Surgeon: Dk Rosales MD;  Location: [facility] OR;  Service: Pain Management;  Laterality: Bilateral;  L5-S1     TRANSFORAMINAL EPIDURAL INJECTION OF STEROID Bilateral 3/28/2022    Procedure: INJECTION, STEROID, EPIDURAL, TRANSFORAMINAL APPROACH Lumbar L5-S1;  Surgeon: Dk Rosales MD;  Location: [facility] OR;  Service: Pain Management;  Laterality: Bilateral;     Family History   Problem Relation Name Age of Onset    Hypertension Father      Glaucoma Neg Hx      Macular degeneration Neg Hx      Retinal detachment Neg Hx       Social History[1]  Review of Systems   Constitutional:  Negative for chills and fever.   HENT:  Negative for facial swelling and trouble swallowing.    Eyes:  Negative for photophobia, discharge and visual disturbance.   Respiratory:  Negative for cough, chest tightness, shortness of breath and wheezing.    Cardiovascular:  Negative for chest pain and palpitations.   Gastrointestinal:  Positive for nausea and vomiting (Resolved). Negative for abdominal pain and diarrhea.   Genitourinary:  Negative for dysuria and hematuria.   Musculoskeletal:  Positive for arthralgias and myalgias. Negative for back pain, joint swelling, neck pain and neck stiffness.   Skin:  Negative for color change, pallor, rash and wound.   Neurological:  Positive for light-headedness and headaches. Negative for dizziness, syncope, weakness and numbness.   Hematological:  Bruises/bleeds easily (Takes aspirin daily).   Psychiatric/Behavioral:  The patient is not nervous/anxious.        Physical Exam     Initial Vitals [07/19/25 1504]   BP Pulse Resp Temp SpO2   (!) 143/100 89 18 97.9 °F (36.6 °C) 98 %       MAP       --         Physical Exam    Nursing note and vitals reviewed.  Constitutional: She appears well-developed and well-nourished. She is not diaphoretic. No distress.   HENT:   Head: Normocephalic.   Right Ear: External ear normal.   Left Ear: External ear normal.   Nose: Nose normal. Mouth/Throat: Oropharynx is clear and moist.   Moderately sized area of ecchymosis and hematoma noted to right forehead.  No abrasion or laceration.   Eyes: Conjunctivae and EOM are normal. Pupils are equal, round, and reactive to light.   Neck: Neck supple.   Normal range of motion.  Cardiovascular:  Normal rate, regular rhythm, normal heart sounds and intact distal pulses.           Pulmonary/Chest: Breath sounds normal. No respiratory distress. She has no wheezes. She has no rhonchi. She has no rales.   Abdominal: Abdomen is soft. She exhibits no distension and no mass. There is no abdominal tenderness.   Musculoskeletal:         General: Tenderness present. No edema. Normal range of motion.      Cervical back: Normal range of motion and neck supple.      Comments: Ecchymosis at the tenderness to palpation noted to right anterior knee.  Ecchymosis and tenderness to palpation noted to right dorsal foot and right 4th toe.     Neurological: She is alert and oriented to person, place, and time. She has normal strength. No cranial nerve deficit or sensory deficit. GCS score is 15. GCS eye subscore is 4. GCS verbal subscore is 5. GCS motor subscore is 6.   No focal neurological deficits noted.  Cranial nerves III-XII grossly intact.  Equal strength and sensation noted to bilateral upper and lower extremities.   Skin: Skin is warm and dry. No rash and no abscess noted. No erythema.   Psychiatric: She has a normal mood and affect.         ED Course   Procedures  Labs Reviewed   COMPREHENSIVE METABOLIC PANEL - Abnormal       Result Value    Sodium 139      Potassium 3.6      Chloride 105      CO2 22 (*)     Glucose 99      BUN 14    dictation system. I created this record but it may contain dictation errors given the limitations of this technology.    Creatinine 0.8      Calcium 9.9      Protein Total 7.1      Albumin 3.9      Bilirubin Total 0.6            AST 19      ALT <8 (*)     Anion Gap 12      eGFR >60     URINALYSIS, REFLEX TO URINE CULTURE - Abnormal    Color, UA Yellow      Appearance, UA Clear      Spec Grav UA 1.015      pH, UA 7.0      Protein, UA Negative      Glucose, UA Negative      Ketones, UA Trace (*)     Blood, UA Negative      Bilirubin, UA Negative      Urobilinogen, UA Negative      Nitrites, UA Negative      Leukocyte Esterase, UA 3+ (*)    URINALYSIS MICROSCOPIC - Abnormal    RBC, UA 1      WBC, UA 11 (*)     Bacteria, UA Rare      Yeast, UA Rare (*)     Squamous Epithelial Cells, UA 4      Microscopic Comment       TROPONIN I HIGH SENSITIVITY - Normal    Troponin High Sensitive 4     CBC WITH DIFFERENTIAL - Normal    WBC 4.92      RBC 4.35      Hgb 12.5      Hct 38.5      MCV 89      MCH 28.7      MCHC 32.5      RDW 12.5      Platelet Count 237      MPV 9.8      Nucleated RBC 0      Neut % 58.9      Lymph % 29.3      Mono % 11.6      Eos % 0.0      Basophil % 0.2      Imm Grans % 0.0      Neut # 2.9      Lymph # 1.44      Mono # 0.57      Eos # 0.00      Baso # 0.01      Imm Grans # 0.00     CULTURE, URINE   CBC W/ AUTO DIFFERENTIAL    Narrative:     The following orders were created for panel order CBC auto differential.  Procedure                               Abnormality         Status                     ---------                               -----------         ------                     CBC with Differential[[id number]]       Normal              Final result                 Please view results for these tests on the individual orders.          Imaging Results              CT Head Without Contrast (Final result)  Result time 07/19/25 17:20:15      Final result by Jorge Vu MD (07/19/25 17:20:15)                   Impression:      Decreasing size of right frontal scalp hematoma with no new soft tissue hematoma  or fracture.    Stable CT of the brain with no new hemorrhage, mass or infarction.      Electronically signed by: Jorge Vu  Date:    07/19/2025  Time:    17:20               Narrative:    EXAMINATION:  CT HEAD WITHOUT CONTRAST    CLINICAL HISTORY:  Head trauma, minor (Age >= 65y);    TECHNIQUE:  Low dose axial images were obtained through the head.  Coronal and sagittal reformations were also performed. Contrast was not administered.    COMPARISON:  CT, 07/18/2025    FINDINGS:  BRAIN:    The right frontal scalp hematoma has decreased in size since the prior day's exam.  No new scalp soft tissue swelling, fracture or acute brain abnormality is evident.  Meningioma in the posterior cranial fossa on the left is again noted.  No pathologic fluid collection.  No loss of gray-white matter junction differentiation.  Calvarium Mauricio intact with chronic deformity of the lamina papyracea of the medial right orbit.                                       Medications   sodium chloride 0.9% bolus 1,000 mL 1,000 mL (1,000 mLs Intravenous New Bag 7/19/25 1812)   acetaminophen tablet 1,000 mg (has no administration in time range)   cefTRIAXone injection 1 g (1 g Intravenous Given 7/19/25 1812)   meclizine tablet 25 mg (25 mg Oral Given 7/19/25 1811)   fluconazole tablet 200 mg (200 mg Oral Given 7/19/25 1811)     Medical Decision Making  Differential diagnosis:  Concussion  Acute intracranial bleed  Fracture  Dehydration    Pt emergently evaluated here in the ED.    Repeat head CT is negative for acute intracranial processes or fracture.  Labs are stable without leukocytosis.  Normal electrolytes, LFTs and renal function.  Urinalysis does show some evidence for yeast and white blood cells.  She is given a dose of Rocephin here in the emergency department.  Troponin is negative.  EKG shows no evidence for acute arrhythmia or ischemia.  At this time, her symptoms are likely related to underlying concussion after falling and  hitting her head yesterday.  Patient had negative x-rays of knee and foot during evaluation yesterday.  No indication for repeat imaging here in the ED today.  We feel comfortable discharging her home with a prescription for antibiotics and concussion precautions.  She voices understanding and is agreeable with the plan.  She is given specific return precautions.    Amount and/or Complexity of Data Reviewed  Labs: ordered. Decision-making details documented in ED Course.  Radiology: ordered. Decision-making details documented in ED Course.  ECG/medicine tests: ordered. Decision-making details documented in ED Course.    Risk  OTC drugs.  Prescription drug management.              Attending Attestation:     Physician Attestation Statement for NP/PA:       Other NP/PA Attestation Additions:    History of Present Illness: 71-year-old female presented for evaluation of injuries status post fall.    Medical Decision Making: Initial differential diagnosis included but not limited to delayed intracranial hemorrhage, concussion, and cardiac arrhythmia.  I am in agreement with the physician assistant's  assessment, treatment, and plan of care.             ED Course as of 07/19/25 1841   Sat Jul 19, 2025   1603 EKG shows normal sinus rhythm with rate of 69 bpm.  [HS]      ED Course User Index  [HS] Hope Pittman, SEKOU                               Clinical Impression:  Final diagnoses:  [R42] Lightheaded  [S06.0X0A] Concussion without loss of consciousness, initial encounter (Primary)  [S00.03XD] Scalp hematoma, subsequent encounter  [S80.01XD] Contusion of right knee, subsequent encounter  [S90.31XD] Contusion of right foot, subsequent encounter  [N30.00] Acute cystitis without hematuria          ED Disposition Condition    Discharge Stable          ED Prescriptions       Medication Sig Dispense Start Date End Date Auth. Provider    meclizine (ANTIVERT) 25 mg tablet Take 1 tablet (25 mg total) by mouth 3 (three) times  daily as needed for Dizziness or Nausea. 20 tablet 2025 -- Hope Pittman PA-C          Follow-up Information       Follow up With Specialties Details Why Contact Info Additional Information    [facility]  As needed, If symptoms worsen [address] [place] [address] [address] [place] [id number] 1st floor                 Hope Pittman PA-C  [phone]         [1]   Social History  Tobacco Use    Smoking status: Former     Current packs/day: 0.00     Average packs/day: 1 pack/day for 40.0 years (40.0 ttl pk-yrs)     Types: Cigarettes     Start date: 1978     Quit date: 2018     Years since quittin.5    Smokeless tobacco: Never    Tobacco comments:     smokes 2-3 cigarettes a night   Substance Use Topics    Alcohol use: Not Currently     Alcohol/week: 12.0 standard drinks of alcohol     Types: 6 Cans of beer, 6 Unspecified drink type per week     Comment: states has not had alcohol since 10/2023    Drug use: No        William Brown MD  [phone]